# Patient Record
Sex: FEMALE | Race: WHITE | NOT HISPANIC OR LATINO | Employment: OTHER | RURAL
[De-identification: names, ages, dates, MRNs, and addresses within clinical notes are randomized per-mention and may not be internally consistent; named-entity substitution may affect disease eponyms.]

---

## 2017-06-26 ENCOUNTER — OFFICE VISIT (OUTPATIENT)
Dept: FAMILY MEDICINE CLINIC | Facility: CLINIC | Age: 77
End: 2017-06-26

## 2017-06-26 VITALS
HEART RATE: 78 BPM | WEIGHT: 159.9 LBS | TEMPERATURE: 96.9 F | DIASTOLIC BLOOD PRESSURE: 77 MMHG | SYSTOLIC BLOOD PRESSURE: 123 MMHG | BODY MASS INDEX: 25.7 KG/M2 | HEIGHT: 66 IN

## 2017-06-26 DIAGNOSIS — E89.0 POSTOPERATIVE HYPOTHYROIDISM: Primary | ICD-10-CM

## 2017-06-26 DIAGNOSIS — H10.9 CONJUNCTIVITIS OF BOTH EYES, UNSPECIFIED CONJUNCTIVITIS TYPE: ICD-10-CM

## 2017-06-26 DIAGNOSIS — G50.0 TRIGEMINAL NEURALGIA: ICD-10-CM

## 2017-06-26 PROCEDURE — 99213 OFFICE O/P EST LOW 20 MIN: CPT | Performed by: FAMILY MEDICINE

## 2017-06-26 RX ORDER — HYDROCODONE BITARTRATE AND ACETAMINOPHEN 5; 325 MG/1; MG/1
TABLET ORAL
COMMUNITY
Start: 2017-04-06 | End: 2017-06-26

## 2017-06-26 RX ORDER — LEVOTHYROXINE SODIUM 100 MCG
100 TABLET ORAL DAILY
Qty: 30 TABLET | Refills: 6 | Status: SHIPPED | OUTPATIENT
Start: 2017-06-26 | End: 2018-01-22 | Stop reason: SDUPTHER

## 2017-06-26 RX ORDER — LORAZEPAM 2 MG/1
2 TABLET ORAL 2 TIMES DAILY PRN
Qty: 45 TABLET | Refills: 5
Start: 2017-06-26 | End: 2018-01-22 | Stop reason: SDUPTHER

## 2017-06-26 RX ORDER — ERYTHROMYCIN 5 MG/G
OINTMENT OPHTHALMIC EVERY 6 HOURS
Qty: 3.5 G | Refills: 0 | Status: SHIPPED | OUTPATIENT
Start: 2017-06-26 | End: 2018-08-20

## 2017-06-26 RX ORDER — FLUCONAZOLE 200 MG/1
200 TABLET ORAL DAILY
Qty: 2 TABLET | Refills: 0 | Status: SHIPPED | OUTPATIENT
Start: 2017-06-26 | End: 2018-01-22 | Stop reason: SDUPTHER

## 2017-06-26 RX ORDER — PREDNISOLONE ACETATE 10 MG/ML
SUSPENSION/ DROPS OPHTHALMIC
COMMUNITY
Start: 2017-03-28 | End: 2017-06-26

## 2017-06-26 NOTE — PROGRESS NOTES
"Subjective   Leonie Laws is a 76 y.o. female.     History of Present Illness follow-up for medication management.  Having some recurrent eye difficulties as well as recurrent vaginal irritation consistent with yeast.  Staying very active mostly in [employer].  Dietary compliance is labile.  No regular exercise but is active.  It's is current with GI PME.  Current with mammography.  Medications are as reconciled.  The neuralgia is stable.  Has had no chest CDV GI symptoms.  Did have Mohs  surgery on nose.  Has had no other dermatological concerns.    The following portions of the patient's history were reviewed and updated as appropriate: allergies, past family history, past medical history, past social history, past surgical history and problem list.    Review of Systems see the history of present illness    Objective   Physical Exam   Constitutional: She is oriented to person, place, and time. She appears well-developed and well-nourished.   HENT:   Head: Normocephalic.   Mouth/Throat: Oropharynx is clear and moist.   Eyes: EOM are normal. Pupils are equal, round, and reactive to light.   Bilateral scleral conjunctival injection   Neck: Normal range of motion. Neck supple. No thyromegaly present.   Cardiovascular: Normal rate, regular rhythm and normal heart sounds.    Pulmonary/Chest: Effort normal and breath sounds normal.   Neurological: She is alert and oriented to person, place, and time.   Skin: Skin is warm and dry.   Psychiatric: She has a normal mood and affect.     /77 (BP Location: Left arm, Patient Position: Sitting)  Pulse 78  Temp 96.9 °F (36.1 °C) (Oral)   Ht 66\" (167.6 cm)  Wt 159 lb 14.4 oz (72.5 kg)  BMI 25.81 kg/m2  Assessment/Plan   Leonie was seen today for anxiety, vaginitis and med refill.    Diagnoses and all orders for this visit:    Postoperative hypothyroidism    Trigeminal neuralgia    Conjunctivitis of both eyes, unspecified conjunctivitis type    Other orders  -     SYNTHROID " 100 MCG tablet; Take 1 tablet by mouth Daily.  -     conjugated estrogens (PREMARIN) 0.625 MG/GM vaginal cream; Insert  into the vagina Daily. Use twice weekly  -     LORazepam (ATIVAN) 2 MG tablet; Take 1 tablet by mouth 2 (Two) Times a Day As Needed for Anxiety.  -     fluconazole (DIFLUCAN) 200 MG tablet; Take 1 tablet by mouth Daily.  -     erythromycin (ROMYCIN) 5 MG/GM ophthalmic ointment; Apply  to eye Every 6 (Six) Hours.      You state you had recent lab.  Will today renewed medications as noted.  Use the topical antibiotic for eyes.  I encourage you to arrange evaluation at your ophthalmologist.  Today I renewed the lorazepam  45.  5 refills.  Encourage you to make good choices regarding diet and activity.  Keep in a requested follow-up with dermatology of course.  Will recheck in 6 months routinely or as needed.  As part of this patient's treatment plan I am prescribing controlled substances. The patient has been made aware of appropriate use of such medications, including potential risk of somnolence, limited ability to drive and/or work safely, and potential for dependence or overdose. It has also been made clear that these medications are for use by this patient only, without concomitant use of alcohol or other substances unless prescribed.  Patient has completed prescribing agreement detailing terms of continued prescribing of controlled substances, including monitoring CIRILO reports, urine drug screening, and pill counts if necessary. The patient is aware that inappropriate use will results in cessation of prescribing such medications.  CIRILO report has been reviewed.  CIRILO is updated every 3 months.  History and physical exam exhibit continued safe and appropriate use of controlled substances.

## 2017-11-20 PROCEDURE — 87205 SMEAR GRAM STAIN: CPT | Performed by: PHYSICIAN ASSISTANT

## 2017-11-20 PROCEDURE — 87147 CULTURE TYPE IMMUNOLOGIC: CPT | Performed by: PHYSICIAN ASSISTANT

## 2017-11-20 PROCEDURE — 87077 CULTURE AEROBIC IDENTIFY: CPT | Performed by: PHYSICIAN ASSISTANT

## 2017-11-20 PROCEDURE — 87186 SC STD MICRODIL/AGAR DIL: CPT | Performed by: PHYSICIAN ASSISTANT

## 2017-11-20 PROCEDURE — 87070 CULTURE OTHR SPECIMN AEROBIC: CPT | Performed by: PHYSICIAN ASSISTANT

## 2017-11-28 ENCOUNTER — TELEPHONE (OUTPATIENT)
Dept: URGENT CARE | Facility: CLINIC | Age: 77
End: 2017-11-28

## 2018-01-10 ENCOUNTER — TELEPHONE (OUTPATIENT)
Dept: FAMILY MEDICINE CLINIC | Facility: CLINIC | Age: 78
End: 2018-01-10

## 2018-01-10 NOTE — TELEPHONE ENCOUNTER
PATIENT CALLED TO SCHEDULE APPOINTMENT FOR Monday OR Tuesday DUE TO BEING OUT OF TOWN FOR A UTI AND POSSIBLY THE FLU. I ADVISED PATIENT TO SEEK CARE AT A LOCAL URGENT CARE OR ER. I OFFERED APPOINTMENT WITH OUR APRN AND SHE REFUSED. SCHEDULED PATIENT FOR FIRST AVAILABLE 1/22/18 AND AGAIN ADVISED PATIENT TO SEEK CARE SOONER.

## 2018-01-22 ENCOUNTER — OFFICE VISIT (OUTPATIENT)
Dept: FAMILY MEDICINE CLINIC | Facility: CLINIC | Age: 78
End: 2018-01-22

## 2018-01-22 VITALS
SYSTOLIC BLOOD PRESSURE: 124 MMHG | WEIGHT: 147.3 LBS | TEMPERATURE: 97 F | DIASTOLIC BLOOD PRESSURE: 73 MMHG | BODY MASS INDEX: 23.12 KG/M2 | HEART RATE: 86 BPM | HEIGHT: 67 IN

## 2018-01-22 DIAGNOSIS — F41.9 ANXIETY: ICD-10-CM

## 2018-01-22 DIAGNOSIS — R73.01 IMPAIRED FASTING GLUCOSE: ICD-10-CM

## 2018-01-22 DIAGNOSIS — E89.0 POSTOPERATIVE HYPOTHYROIDISM: Primary | ICD-10-CM

## 2018-01-22 DIAGNOSIS — N76.1 SUBACUTE VAGINITIS: ICD-10-CM

## 2018-01-22 PROCEDURE — 99213 OFFICE O/P EST LOW 20 MIN: CPT | Performed by: FAMILY MEDICINE

## 2018-01-22 RX ORDER — FLUCONAZOLE 200 MG/1
200 TABLET ORAL DAILY
Qty: 2 TABLET | Refills: 0 | Status: SHIPPED | OUTPATIENT
Start: 2018-01-22 | End: 2018-08-20

## 2018-01-22 RX ORDER — LEVOTHYROXINE SODIUM 100 MCG
100 TABLET ORAL DAILY
Qty: 30 TABLET | Refills: 6 | Status: SHIPPED | OUTPATIENT
Start: 2018-01-22 | End: 2018-08-20 | Stop reason: SDUPTHER

## 2018-01-22 RX ORDER — LORAZEPAM 2 MG/1
2 TABLET ORAL 2 TIMES DAILY PRN
Qty: 45 TABLET | Refills: 5
Start: 2018-01-22 | End: 2018-08-20 | Stop reason: SDUPTHER

## 2018-01-23 LAB
ALBUMIN SERPL-MCNC: 4.4 G/DL (ref 3.4–4.8)
ALBUMIN/GLOB SERPL: 1.8 G/DL (ref 1.5–2.5)
ALP SERPL-CCNC: 89 U/L (ref 35–104)
ALT SERPL-CCNC: 26 U/L (ref 10–36)
AST SERPL-CCNC: 27 U/L (ref 10–30)
BILIRUB SERPL-MCNC: 0.5 MG/DL (ref 0.2–1.8)
BUN SERPL-MCNC: 22 MG/DL (ref 7–21)
BUN/CREAT SERPL: 26.2 (ref 7–25)
CALCIUM SERPL-MCNC: 9.6 MG/DL (ref 7.7–10)
CHLORIDE SERPL-SCNC: 107 MMOL/L (ref 99–112)
CO2 SERPL-SCNC: 28.4 MMOL/L (ref 24.3–31.9)
CREAT SERPL-MCNC: 0.84 MG/DL (ref 0.43–1.29)
GLOBULIN SER CALC-MCNC: 2.4 GM/DL
GLUCOSE SERPL-MCNC: 109 MG/DL (ref 70–110)
HBA1C MFR BLD: 5.9 % (ref 4.5–5.7)
POTASSIUM SERPL-SCNC: 4.5 MMOL/L (ref 3.5–5.3)
PROT SERPL-MCNC: 6.8 G/DL (ref 6–8)
SODIUM SERPL-SCNC: 140 MMOL/L (ref 135–153)
TSH SERPL DL<=0.005 MIU/L-ACNC: 1.77 MIU/ML (ref 0.55–4.78)

## 2018-08-20 ENCOUNTER — OFFICE VISIT (OUTPATIENT)
Dept: FAMILY MEDICINE CLINIC | Facility: CLINIC | Age: 78
End: 2018-08-20

## 2018-08-20 VITALS
DIASTOLIC BLOOD PRESSURE: 79 MMHG | HEIGHT: 67 IN | BODY MASS INDEX: 23.43 KG/M2 | SYSTOLIC BLOOD PRESSURE: 129 MMHG | TEMPERATURE: 97 F | WEIGHT: 149.3 LBS | HEART RATE: 87 BPM

## 2018-08-20 DIAGNOSIS — R73.01 IMPAIRED FASTING GLUCOSE: ICD-10-CM

## 2018-08-20 DIAGNOSIS — E89.0 POSTOPERATIVE HYPOTHYROIDISM: ICD-10-CM

## 2018-08-20 DIAGNOSIS — R35.0 FREQUENCY OF URINATION: Primary | ICD-10-CM

## 2018-08-20 DIAGNOSIS — F41.9 ANXIETY: ICD-10-CM

## 2018-08-20 LAB
ALBUMIN SERPL-MCNC: 4.6 G/DL (ref 3.4–4.8)
ALBUMIN/GLOB SERPL: 1.9 G/DL (ref 1.5–2.5)
ALP SERPL-CCNC: 92 U/L (ref 35–104)
ALT SERPL-CCNC: 20 U/L (ref 10–36)
AST SERPL-CCNC: 21 U/L (ref 10–30)
BASOPHILS # BLD AUTO: 0.01 10*3/MM3 (ref 0–0.3)
BASOPHILS NFR BLD AUTO: 0.2 % (ref 0–2)
BILIRUB BLD-MCNC: NEGATIVE MG/DL
BILIRUB SERPL-MCNC: 0.6 MG/DL (ref 0.2–1.8)
BUN SERPL-MCNC: 23 MG/DL (ref 7–21)
BUN/CREAT SERPL: 23.7 (ref 7–25)
CALCIUM SERPL-MCNC: 9.3 MG/DL (ref 7.7–10)
CHLORIDE SERPL-SCNC: 108 MMOL/L (ref 99–112)
CHOLEST SERPL-MCNC: 231 MG/DL (ref 0–200)
CLARITY, POC: ABNORMAL
CO2 SERPL-SCNC: 26.2 MMOL/L (ref 24.3–31.9)
COLOR UR: ABNORMAL
CREAT SERPL-MCNC: 0.97 MG/DL (ref 0.43–1.29)
EOSINOPHIL # BLD AUTO: 0.12 10*3/MM3 (ref 0–0.7)
EOSINOPHIL NFR BLD AUTO: 2.1 % (ref 0–7)
ERYTHROCYTE [DISTWIDTH] IN BLOOD BY AUTOMATED COUNT: 15.7 % (ref 11.5–14.5)
GLOBULIN SER CALC-MCNC: 2.4 GM/DL
GLUCOSE SERPL-MCNC: 121 MG/DL (ref 70–110)
GLUCOSE UR STRIP-MCNC: NEGATIVE MG/DL
HBA1C MFR BLD: 5.7 % (ref 4.5–5.7)
HCT VFR BLD AUTO: 42.1 % (ref 37–47)
HDLC SERPL-MCNC: 41 MG/DL (ref 60–100)
HGB BLD-MCNC: 13.9 G/DL (ref 12–16)
IMM GRANULOCYTES # BLD: 0.02 10*3/MM3 (ref 0–0.03)
IMM GRANULOCYTES NFR BLD: 0.3 % (ref 0–0.5)
KETONES UR QL: ABNORMAL
LDLC SERPL CALC-MCNC: 169 MG/DL (ref 0–100)
LEUKOCYTE EST, POC: ABNORMAL
LYMPHOCYTES # BLD AUTO: 2.01 10*3/MM3 (ref 1–3)
LYMPHOCYTES NFR BLD AUTO: 34.7 % (ref 16–46)
MCH RBC QN AUTO: 30 PG (ref 27–33)
MCHC RBC AUTO-ENTMCNC: 33 G/DL (ref 33–37)
MCV RBC AUTO: 90.7 FL (ref 80–94)
MONOCYTES # BLD AUTO: 0.45 10*3/MM3 (ref 0.1–0.9)
MONOCYTES NFR BLD AUTO: 7.8 % (ref 0–12)
NEUTROPHILS # BLD AUTO: 3.19 10*3/MM3 (ref 1.4–6.5)
NEUTROPHILS NFR BLD AUTO: 54.9 % (ref 40–75)
NITRITE UR-MCNC: NEGATIVE MG/ML
PH UR: 5.5 [PH] (ref 5–8)
PLATELET # BLD AUTO: 226 10*3/MM3 (ref 130–400)
POTASSIUM SERPL-SCNC: 4.1 MMOL/L (ref 3.5–5.3)
PROT SERPL-MCNC: 7 G/DL (ref 6–8)
PROT UR STRIP-MCNC: ABNORMAL MG/DL
RBC # BLD AUTO: 4.64 10*6/MM3 (ref 4.2–5.4)
RBC # UR STRIP: ABNORMAL /UL
SODIUM SERPL-SCNC: 140 MMOL/L (ref 135–153)
SP GR UR: 1.03 (ref 1–1.03)
TRIGL SERPL-MCNC: 104 MG/DL (ref 0–150)
TSH SERPL DL<=0.005 MIU/L-ACNC: 2.76 MIU/ML (ref 0.55–4.78)
UROBILINOGEN UR QL: NORMAL
VLDLC SERPL CALC-MCNC: 20.8 MG/DL
WBC # BLD AUTO: 5.8 10*3/MM3 (ref 4.5–12.5)

## 2018-08-20 PROCEDURE — 36415 COLL VENOUS BLD VENIPUNCTURE: CPT | Performed by: FAMILY MEDICINE

## 2018-08-20 PROCEDURE — 99214 OFFICE O/P EST MOD 30 MIN: CPT | Performed by: FAMILY MEDICINE

## 2018-08-20 PROCEDURE — 81002 URINALYSIS NONAUTO W/O SCOPE: CPT | Performed by: FAMILY MEDICINE

## 2018-08-20 RX ORDER — LEVOTHYROXINE SODIUM 100 MCG
100 TABLET ORAL DAILY
Qty: 30 TABLET | Refills: 6 | Status: SHIPPED | OUTPATIENT
Start: 2018-08-20 | End: 2018-12-18 | Stop reason: SDUPTHER

## 2018-08-20 RX ORDER — NITROFURANTOIN 25; 75 MG/1; MG/1
100 CAPSULE ORAL 2 TIMES DAILY
Qty: 20 CAPSULE | Refills: 0 | Status: SHIPPED | OUTPATIENT
Start: 2018-08-20 | End: 2018-08-30

## 2018-08-20 RX ORDER — LORAZEPAM 2 MG/1
2 TABLET ORAL 2 TIMES DAILY PRN
Qty: 45 TABLET | Refills: 5
Start: 2018-08-20 | End: 2019-02-26 | Stop reason: SDUPTHER

## 2018-08-20 NOTE — PROGRESS NOTES
Subjective   Leonie Laws is a 78 y.o. female.     History of Present Illness several days of recurrent lower urinary tract symptoms.  Having episodic eye symptoms.  Still struggling with the trigeminal neuralgia and plans to follow-up over at [facility] soon.  Medications are as reconciled.  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  Denies chest CDV GI symptoms.  Sleep patterns are stable.  Energy is fair.  No restricted diet.  No regular aerobic exercise.    The following portions of the patient's history were reviewed and updated as appropriate: current medications, past family history, past medical history, past social history, past surgical history and problem list.    Review of Systems see the history of present illness    Objective   Physical Exam   Constitutional: She is oriented to person, place, and time. She appears well-developed and well-nourished.   HENT:   Head: Normocephalic.   Eyes: Pupils are equal, round, and reactive to light. EOM are normal.   Neck: Normal range of motion. Neck supple. No tracheal deviation present. No thyromegaly present.   Cardiovascular: Normal rate, regular rhythm and normal heart sounds.    No murmur heard.  Pulmonary/Chest: Effort normal and breath sounds normal.   Abdominal: Soft. There is no tenderness.   Musculoskeletal: She exhibits no edema.   Neurological: She is alert and oriented to person, place, and time.   Skin: Skin is warm and dry.   Psychiatric: She has a normal mood and affect.   Vitals reviewed.      Assessment/Plan   Leonie was seen today for urinary tract infection and vaginitis.    Diagnoses and all orders for this visit:    Frequency of urination  -     POCT urinalysis dipstick, manual  -     nitrofurantoin, macrocrystal-monohydrate, (MACROBID) 100 MG capsule; Take 1 capsule by mouth 2 (Two) Times a Day for 10 days.    Anxiety  -     LORazepam (ATIVAN) 2 MG tablet;  Take 1 tablet by mouth 2 (Two) Times a Day As Needed for Anxiety.    Postoperative hypothyroidism  -     SYNTHROID 100 MCG tablet; Take 1 tablet by mouth Daily.  -     Comprehensive Metabolic Panel  -     TSH  -     Lipid Panel    Impaired fasting glucose  -     CBC & Differential  -     Comprehensive Metabolic Panel  -     Lipid Panel  -     Hemoglobin A1c      Meds as directed.  In office urine dip noted.  Encourage some carbohydrate restrictions.  Stay safely active.  Recheck in a few months routinely.  Encourage you to consider vaccines.      Patient's Body mass index is 23.38 kg/m². BMI is within normal parameters. No follow-up required.

## 2018-08-21 NOTE — PROGRESS NOTES
Lab testing is very stable.  Cholesterol is quite poor but has been before.  Let us know if willing to use a medication to help with cholesterol.  Thyroid good.

## 2018-12-18 DIAGNOSIS — E89.0 POSTOPERATIVE HYPOTHYROIDISM: ICD-10-CM

## 2018-12-18 RX ORDER — LEVOTHYROXINE SODIUM 100 MCG
100 TABLET ORAL DAILY
Qty: 30 TABLET | Refills: 6 | Status: SHIPPED | OUTPATIENT
Start: 2018-12-18 | End: 2019-02-26 | Stop reason: SDUPTHER

## 2018-12-18 NOTE — TELEPHONE ENCOUNTER
Patient called requesting a refill on synthroid. Send to [facility] IN [place]. P#[phone].

## 2019-02-26 ENCOUNTER — OFFICE VISIT (OUTPATIENT)
Dept: FAMILY MEDICINE CLINIC | Facility: CLINIC | Age: 79
End: 2019-02-26

## 2019-02-26 VITALS
TEMPERATURE: 97.4 F | DIASTOLIC BLOOD PRESSURE: 69 MMHG | HEART RATE: 87 BPM | SYSTOLIC BLOOD PRESSURE: 125 MMHG | BODY MASS INDEX: 23.87 KG/M2 | WEIGHT: 152.1 LBS | HEIGHT: 67 IN

## 2019-02-26 DIAGNOSIS — F41.9 ANXIETY: Primary | ICD-10-CM

## 2019-02-26 DIAGNOSIS — G50.0 TRIGEMINAL NEURALGIA: ICD-10-CM

## 2019-02-26 DIAGNOSIS — R73.01 IMPAIRED FASTING GLUCOSE: ICD-10-CM

## 2019-02-26 DIAGNOSIS — E89.0 POSTOPERATIVE HYPOTHYROIDISM: ICD-10-CM

## 2019-02-26 LAB
ALBUMIN SERPL-MCNC: 4.5 G/DL (ref 3.4–4.8)
ALBUMIN/GLOB SERPL: 1.8 G/DL (ref 1.5–2.5)
ALP SERPL-CCNC: 92 U/L (ref 35–104)
ALT SERPL-CCNC: 20 U/L (ref 10–36)
AST SERPL-CCNC: 24 U/L (ref 10–30)
BASOPHILS # BLD AUTO: 0.03 10*3/MM3 (ref 0–0.3)
BASOPHILS NFR BLD AUTO: 0.5 % (ref 0–2)
BILIRUB SERPL-MCNC: 0.7 MG/DL (ref 0.2–1.8)
BUN SERPL-MCNC: 18 MG/DL (ref 7–21)
BUN/CREAT SERPL: 20 (ref 7–25)
CALCIUM SERPL-MCNC: 9.8 MG/DL (ref 7.7–10)
CHLORIDE SERPL-SCNC: 106 MMOL/L (ref 99–112)
CHOLEST SERPL-MCNC: 206 MG/DL (ref 0–200)
CO2 SERPL-SCNC: 25.6 MMOL/L (ref 24.3–31.9)
CREAT SERPL-MCNC: 0.9 MG/DL (ref 0.43–1.29)
EOSINOPHIL # BLD AUTO: 0.12 10*3/MM3 (ref 0–0.7)
EOSINOPHIL NFR BLD AUTO: 2.1 % (ref 0–7)
ERYTHROCYTE [DISTWIDTH] IN BLOOD BY AUTOMATED COUNT: 15.5 % (ref 11.5–14.5)
GLOBULIN SER CALC-MCNC: 2.5 GM/DL
GLUCOSE SERPL-MCNC: 124 MG/DL (ref 70–110)
HBA1C MFR BLD: 6.3 % (ref 4.5–5.7)
HCT VFR BLD AUTO: 41.4 % (ref 37–47)
HDLC SERPL-MCNC: 40 MG/DL (ref 60–100)
HGB BLD-MCNC: 13.6 G/DL (ref 12–16)
IMM GRANULOCYTES # BLD AUTO: 0.01 10*3/MM3 (ref 0–0.03)
IMM GRANULOCYTES NFR BLD AUTO: 0.2 % (ref 0–0.5)
LDLC SERPL CALC-MCNC: 139 MG/DL (ref 0–100)
LYMPHOCYTES # BLD AUTO: 2.03 10*3/MM3 (ref 1–3)
LYMPHOCYTES NFR BLD AUTO: 35.3 % (ref 16–46)
MCH RBC QN AUTO: 29.6 PG (ref 27–33)
MCHC RBC AUTO-ENTMCNC: 32.9 G/DL (ref 33–37)
MCV RBC AUTO: 90 FL (ref 80–94)
MONOCYTES # BLD AUTO: 0.4 10*3/MM3 (ref 0.1–0.9)
MONOCYTES NFR BLD AUTO: 7 % (ref 0–12)
NEUTROPHILS # BLD AUTO: 3.16 10*3/MM3 (ref 1.4–6.5)
NEUTROPHILS NFR BLD AUTO: 54.9 % (ref 40–75)
PLATELET # BLD AUTO: 226 10*3/MM3 (ref 130–400)
POTASSIUM SERPL-SCNC: 4.5 MMOL/L (ref 3.5–5.3)
PROT SERPL-MCNC: 7 G/DL (ref 6–8)
RBC # BLD AUTO: 4.6 10*6/MM3 (ref 4.2–5.4)
SODIUM SERPL-SCNC: 138 MMOL/L (ref 135–153)
TRIGL SERPL-MCNC: 133 MG/DL (ref 0–150)
TSH SERPL DL<=0.005 MIU/L-ACNC: 2.66 MIU/ML (ref 0.55–4.78)
VLDLC SERPL CALC-MCNC: 26.6 MG/DL
WBC # BLD AUTO: 5.75 10*3/MM3 (ref 4.5–12.5)

## 2019-02-26 PROCEDURE — 99213 OFFICE O/P EST LOW 20 MIN: CPT | Performed by: FAMILY MEDICINE

## 2019-02-26 PROCEDURE — 36415 COLL VENOUS BLD VENIPUNCTURE: CPT | Performed by: FAMILY MEDICINE

## 2019-02-26 RX ORDER — LORAZEPAM 2 MG/1
2 TABLET ORAL 2 TIMES DAILY PRN
Qty: 45 TABLET | Refills: 5
Start: 2019-02-26 | End: 2019-07-29 | Stop reason: SDUPTHER

## 2019-02-26 RX ORDER — ESTRADIOL 0.05 MG/D
1 FILM, EXTENDED RELEASE TRANSDERMAL WEEKLY
Qty: 1 PATCH | Refills: 6 | Status: SHIPPED | OUTPATIENT
Start: 2019-02-26 | End: 2019-09-16 | Stop reason: SDUPTHER

## 2019-02-26 RX ORDER — LEVOTHYROXINE SODIUM 100 MCG
100 TABLET ORAL DAILY
Qty: 30 TABLET | Refills: 6 | Status: SHIPPED | OUTPATIENT
Start: 2019-02-26

## 2019-02-26 RX ORDER — ESTRADIOL 0.05 MG/D
FILM, EXTENDED RELEASE TRANSDERMAL
COMMUNITY
Start: 2019-02-24 | End: 2019-02-26 | Stop reason: SDUPTHER

## 2019-02-26 NOTE — PROGRESS NOTES
Subjective   Leonie Laws is a 78 y.o. female.     History of Present Illness follow-up regarding hypothyroid state anxiety hyperglycemia.  Medication management.  Overall no new problems.  Will be following at [facility] in regard to the trigeminal neuralgia.  Anticipates having another gamma knife procedure.  Did have the gamma knife and passed did have the rhizotomy injection therapy also.  Did also have a cranial balloon procedure.  Not reported to be incapacitating at this time.  Medications are as reconciled.  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  Has not been recently ill.  Denies chest CVA GI  changes.  No interest in vaccine or PMA beyond what is current.    The following portions of the patient's history were reviewed and updated as appropriate: allergies, past family history, past medical history, past social history, past surgical history and problem list.    Review of Systems see HPI    Objective   Physical Exam   Constitutional: She is oriented to person, place, and time. She appears well-developed and well-nourished.   HENT:   Head: Normocephalic.   Eyes: Conjunctivae and EOM are normal. Pupils are equal, round, and reactive to light.   Neck: Normal range of motion. Neck supple. No tracheal deviation present. No thyromegaly present.   Cardiovascular: Normal rate, regular rhythm and normal heart sounds.   No murmur heard.  Pulmonary/Chest: Effort normal and breath sounds normal.   Neurological: She is alert and oriented to person, place, and time.   Skin: Skin is warm and dry.   Psychiatric: She has a normal mood and affect.   Vitals reviewed.      Assessment/Plan   Leonie was seen today for hypothyroidism and anxiety.    Diagnoses and all orders for this visit:    Anxiety  -     LORazepam (ATIVAN) 2 MG tablet; Take 1 tablet by mouth 2 (Two) Times a Day As Needed for Anxiety.    Postoperative hypothyroidism  -     SYNTHROID 100 MCG tablet;  Take 1 tablet by mouth Daily.  -     CBC & Differential  -     Comprehensive Metabolic Panel  -     Lipid Panel  -     TSH    Trigeminal neuralgia    Impaired fasting glucose  -     CBC & Differential  -     Comprehensive Metabolic Panel  -     Hemoglobin A1c    Other orders  -     estradiol (MINIVELLE, VIVELLE-DOT) 0.05 MG/24HR patch; Place 1 patch on the skin as directed by provider 1 (One) Time Per Week.    Encouraged compliance with diet and activity.  Discussed vaccines but you are not interested.  Renewed medication as noted.  Will check labs to monitor metabolic status.  Recheck here in about 6 months or as needed.  You last had mammogram April 2018.  Hopefully upcoming procedure will give the relief you desire regarding the trigeminal neuralgia.  As part of this patient's treatment plan I am prescribing controlled substances. The patient has been made aware of appropriate use of such medications, including potential risk of somnolence, limited ability to drive and/or work safely, and potential for dependence or overdose. It has also been made clear that these medications are for use by this patient only, without concomitant use of alcohol or other substances unless prescribed.  Patient has completed prescribing agreement detailing terms of continued prescribing of controlled substances, including monitoring CIRILO reports, urine drug screening, and pill counts if necessary. The patient is aware that inappropriate use will results in cessation of prescribing such medications.  CIRILO report has been reviewed.  CIRILO is updated every 3 months.  History and physical exam exhibit continued safe and appropriate use of controlled substances.       Patient's Body mass index is 23.82 kg/m². BMI is within normal parameters. No follow-up required..

## 2019-02-27 NOTE — PROGRESS NOTES
Chol profile some better  thyroid level normal chemistries good  average sugar little higher   try to do better with diet-less ice cream  get some regular exercise

## 2019-05-06 ENCOUNTER — OFFICE VISIT (OUTPATIENT)
Dept: FAMILY MEDICINE CLINIC | Facility: CLINIC | Age: 79
End: 2019-05-06

## 2019-05-06 VITALS
HEIGHT: 67 IN | SYSTOLIC BLOOD PRESSURE: 142 MMHG | BODY MASS INDEX: 24.33 KG/M2 | TEMPERATURE: 98.2 F | DIASTOLIC BLOOD PRESSURE: 82 MMHG | HEART RATE: 91 BPM | OXYGEN SATURATION: 98 % | WEIGHT: 155 LBS

## 2019-05-06 DIAGNOSIS — G50.0 TRIGEMINAL NEURALGIA: Primary | ICD-10-CM

## 2019-05-06 PROCEDURE — 99213 OFFICE O/P EST LOW 20 MIN: CPT | Performed by: FAMILY MEDICINE

## 2019-05-06 RX ORDER — ESTRADIOL 0.05 MG/D
1 FILM, EXTENDED RELEASE TRANSDERMAL WEEKLY
Qty: 1 PATCH | Refills: 6 | Status: CANCELLED | OUTPATIENT
Start: 2019-05-06

## 2019-05-06 RX ORDER — GABAPENTIN 400 MG/1
400 CAPSULE ORAL
Qty: 60 CAPSULE | Refills: 1
Start: 2019-05-06 | End: 2019-07-29 | Stop reason: SDUPTHER

## 2019-05-06 RX ORDER — GABAPENTIN 400 MG/1
CAPSULE ORAL
COMMUNITY
End: 2019-05-06 | Stop reason: SDUPTHER

## 2019-05-06 NOTE — PROGRESS NOTES
Subjective   Leonie Laws is a 78 y.o. female.     History of Present Illness some allergy symptoms but essentially desires renewal of gabapentin that is used episodically although most recently on a regular basis for trigeminal neuralgia.  Plans to return [facility] to see specialist for a repeat rhizotomy procedure.  Has had prior.  Has had gamma knife.  Has had balloon procedure.  Utilizing other medications as reconciled.  Denies recent acute illness.  Denies chest CDV GI  changes.    The following portions of the patient's history were reviewed and updated as appropriate: allergies, current medications, past medical history, past social history, past surgical history and problem list.    Review of Systems  See history of Present Illness     Objective     Physical Exam   Constitutional: She is oriented to person, place, and time. She appears well-developed and well-nourished.   Neurological: She is alert and oriented to person, place, and time.   Psychiatric: She has a normal mood and affect.   Vitals reviewed.      PHQ-9 Total Score: 0    Patient's Body mass index is 24.27 kg/m². BMI is within normal parameters. No follow-up required..   (Normal BMI:  18.5-24.9, OW 25-29.9, Obesity 30 or greater)      Assessment/Plan     Leonie was seen today for allergies.    Diagnoses and all orders for this visit:    Trigeminal neuralgia  -     gabapentin (NEURONTIN) 400 MG capsule; Take 1 capsule by mouth 2 (Two) Times a Day.    Other orders  -     Cancel: estradiol (MINIVELLE, VIVELLE-DOT) 0.05 MG/24HR patch; Place 1 patch on the skin as directed by provider 1 (One) Time Per Week.    We will renew the gabapentin to have available twice daily.  Stay safely active.  Maintain heart healthy low glycemic diet.  Follow-up here as scheduled needed in future.  Keep follow-ups with neurology consultants of course.  Abhishek reviewed               This document has been electronically signed by Milton Morales MD    May 6, 2019 1:22 PM

## 2019-07-29 ENCOUNTER — OFFICE VISIT (OUTPATIENT)
Dept: FAMILY MEDICINE CLINIC | Facility: CLINIC | Age: 79
End: 2019-07-29

## 2019-07-29 VITALS
WEIGHT: 154.9 LBS | OXYGEN SATURATION: 98 % | HEIGHT: 67 IN | TEMPERATURE: 98.1 F | HEART RATE: 80 BPM | BODY MASS INDEX: 24.31 KG/M2 | SYSTOLIC BLOOD PRESSURE: 124 MMHG | DIASTOLIC BLOOD PRESSURE: 71 MMHG

## 2019-07-29 DIAGNOSIS — J30.89 ALLERGIC RHINITIS DUE TO OTHER ALLERGIC TRIGGER, UNSPECIFIED SEASONALITY: Primary | ICD-10-CM

## 2019-07-29 DIAGNOSIS — G50.0 TRIGEMINAL NEURALGIA: ICD-10-CM

## 2019-07-29 DIAGNOSIS — F41.9 ANXIETY: ICD-10-CM

## 2019-07-29 PROCEDURE — 99213 OFFICE O/P EST LOW 20 MIN: CPT | Performed by: FAMILY MEDICINE

## 2019-07-29 RX ORDER — GABAPENTIN 400 MG/1
400 CAPSULE ORAL
Qty: 60 CAPSULE | Refills: 3
Start: 2019-07-29 | End: 2019-12-30 | Stop reason: SDUPTHER

## 2019-07-29 RX ORDER — LORAZEPAM 2 MG/1
2 TABLET ORAL 2 TIMES DAILY PRN
Qty: 45 TABLET | Refills: 5
Start: 2019-07-29 | End: 2019-12-30 | Stop reason: SDUPTHER

## 2019-07-29 RX ORDER — METHYLPREDNISOLONE 4 MG/1
TABLET ORAL
Qty: 1 EACH | Refills: 0 | Status: SHIPPED | OUTPATIENT
Start: 2019-07-29 | End: 2019-12-30

## 2019-07-29 NOTE — PROGRESS NOTES
Subjective   Leonie Laws is a 79 y.o. female.     History of Present Illness recurrent rhinitis symptoms.  I will allergy symptoms.  Some sneezing.  OTC no benefit.  Medication management.  Will be planning to go to [place] for another procedure regarding the trigeminal neuralgia.    The following portions of the patient's history were reviewed and updated as appropriate: allergies, current medications, past family history, past social history, past surgical history and problem list.    Review of Systems  See history of Present Illness     Objective     Physical Exam   Constitutional: She is oriented to person, place, and time. She appears well-developed and well-nourished.   HENT:   Head: Normocephalic.   Mouth/Throat: Oropharynx is clear and moist.   Mild nasal congestion   Eyes: EOM are normal. Pupils are equal, round, and reactive to light.   Conjunctiva slightly injected   Cardiovascular: Normal rate, regular rhythm and normal heart sounds.   Pulmonary/Chest: Effort normal and breath sounds normal. She has no wheezes.   Neurological: She is alert and oriented to person, place, and time.   Psychiatric: She has a normal mood and affect.   Vitals reviewed.      PHQ-9 Total Score:      Patient's Body mass index is 24.25 kg/m². BMI is within normal parameters. No follow-up required..   (Normal BMI:  18.5-24.9, OW 25-29.9, Obesity 30 or greater)      Assessment/Plan     Leonie was seen today for allergies.    Diagnoses and all orders for this visit:    Allergic rhinitis due to other allergic trigger, unspecified seasonality  -     methylPREDNISolone (MEDROL, NICK,) 4 MG tablet; Take as directed on package instructions.    Anxiety  -     LORazepam (ATIVAN) 2 MG tablet; Take 1 tablet by mouth 2 (Two) Times a Day As Needed for Anxiety.    Trigeminal neuralgia  -     gabapentin (NEURONTIN) 400 MG capsule; Take 1 capsule by mouth 2 (Two) Times a Day.      Meds as directed.  Renewed some chronic medications.  Keep  follow-ups as needed.  Stay safely active.  Maintain heart healthy diet.  As part of this patient's treatment plan I am prescribing controlled substances. The patient has been made aware of appropriate use of such medications, including potential risk of somnolence, limited ability to drive and/or work safely, and potential for dependence or overdose. It has also been made clear that these medications are for use by this patient only, without concomitant use of alcohol or other substances unless prescribed.  Patient has completed prescribing agreement detailing terms of continued prescribing of controlled substances, including monitoring CIRILO reports, urine drug screening, and pill counts if necessary. The patient is aware that inappropriate use will results in cessation of prescribing such medications.  CIRILO report has been reviewed.  CIRILO is updated every 3 months.  History and physical exam exhibit continued safe and appropriate use of controlled substances.                      This document has been electronically signed by Milton Morales MD   July 29, 2019 4:46 PM

## 2019-09-16 RX ORDER — ESTRADIOL 0.05 MG/D
1 FILM, EXTENDED RELEASE TRANSDERMAL WEEKLY
Qty: 1 PATCH | Refills: 6 | Status: SHIPPED | OUTPATIENT
Start: 2019-09-16 | End: 2019-09-17 | Stop reason: SDUPTHER

## 2019-09-16 NOTE — TELEPHONE ENCOUNTER
LAST REFILL  ESTRADIOL PATCHES 2-26-19 # 1 REFILL X 6    LAST APPOINTMENT: 7-29-19  NEXT APPOINTMENT: NONE.

## 2019-09-17 NOTE — TELEPHONE ENCOUNTER
Refill on estradiol patches, went to wrong pharmacy.  [facility] to [facility].    Last filled estradiol patches 2-26-19 # 1 refill x 6  Last appointment: 7-29-19  Next appointment: none

## 2019-09-18 DIAGNOSIS — M81.0 AGE-RELATED OSTEOPOROSIS WITHOUT CURRENT PATHOLOGICAL FRACTURE: Primary | ICD-10-CM

## 2019-09-18 RX ORDER — ESTRADIOL 0.05 MG/D
1 FILM, EXTENDED RELEASE TRANSDERMAL WEEKLY
Qty: 1 PATCH | Refills: 6 | Status: SHIPPED | OUTPATIENT
Start: 2019-09-18 | End: 2019-12-30 | Stop reason: SDUPTHER

## 2019-09-18 RX ORDER — ESTRADIOL 0.05 MG/D
1 FILM, EXTENDED RELEASE TRANSDERMAL WEEKLY
Qty: 1 PATCH | Refills: 6 | Status: SHIPPED | OUTPATIENT
Start: 2019-09-18 | End: 2019-09-18 | Stop reason: SDUPTHER

## 2019-12-30 ENCOUNTER — OFFICE VISIT (OUTPATIENT)
Dept: FAMILY MEDICINE CLINIC | Facility: CLINIC | Age: 79
End: 2019-12-30

## 2019-12-30 VITALS
BODY MASS INDEX: 23.73 KG/M2 | TEMPERATURE: 97.7 F | HEART RATE: 91 BPM | SYSTOLIC BLOOD PRESSURE: 142 MMHG | DIASTOLIC BLOOD PRESSURE: 86 MMHG | HEIGHT: 67 IN | WEIGHT: 151.2 LBS | OXYGEN SATURATION: 98 %

## 2019-12-30 DIAGNOSIS — F41.9 ANXIETY: ICD-10-CM

## 2019-12-30 DIAGNOSIS — M81.0 AGE-RELATED OSTEOPOROSIS WITHOUT CURRENT PATHOLOGICAL FRACTURE: ICD-10-CM

## 2019-12-30 DIAGNOSIS — G50.0 TRIGEMINAL NEURALGIA: ICD-10-CM

## 2019-12-30 PROCEDURE — 99213 OFFICE O/P EST LOW 20 MIN: CPT | Performed by: FAMILY MEDICINE

## 2019-12-30 RX ORDER — ESTRADIOL 0.05 MG/D
1 FILM, EXTENDED RELEASE TRANSDERMAL WEEKLY
Qty: 1 PATCH | Refills: 6 | Status: SHIPPED | OUTPATIENT
Start: 2019-12-30 | End: 2020-05-04 | Stop reason: SDUPTHER

## 2019-12-30 RX ORDER — LORAZEPAM 2 MG/1
2 TABLET ORAL 2 TIMES DAILY PRN
Qty: 45 TABLET | Refills: 5
Start: 2019-12-30 | End: 2020-06-25 | Stop reason: SDUPTHER

## 2019-12-30 RX ORDER — GABAPENTIN 400 MG/1
400 CAPSULE ORAL
Qty: 60 CAPSULE | Refills: 3
Start: 2019-12-30 | End: 2020-06-25 | Stop reason: SDUPTHER

## 2019-12-30 NOTE — PROGRESS NOTES
Subjective   Leonie Laws is a 79 y.o. female.     History of Present Illness follow-up regarding medication management regards to the trigeminal neuralgia as well as the chronic anxiety.  Desires renewal on the estrogen patch also.  Will be visiting again at [facility] in near future.  Current on desired PME.  Declines vaccines.  Denies respiratory CDV  GI.  Has episodic excess tearing regarding left eye.  Several attempts at topical relief and ophthalmology eval's without benefit.    The following portions of the patient's history were reviewed and updated as appropriate: allergies, past family history, past medical history, past social history, past surgical history and problem list.    Review of Systems  See history of Present Illness     Objective     Physical Exam   Constitutional: She is oriented to person, place, and time. She appears well-developed and well-nourished.   HENT:   Head: Normocephalic.   Eyes: Pupils are equal, round, and reactive to light. Conjunctivae and EOM are normal.   Neck: Normal range of motion. Neck supple. No tracheal deviation present. No thyromegaly present.   Cardiovascular: Normal rate, regular rhythm and normal heart sounds.   No murmur heard.  Pulmonary/Chest: Effort normal and breath sounds normal.   Neurological: She is alert and oriented to person, place, and time.   Skin: Skin is warm and dry.   Psychiatric: She has a normal mood and affect.   Vitals reviewed.      PHQ-9 Total Score:      Patient's Body mass index is 23.68 kg/m². BMI is within normal parameters. No follow-up required..   (Normal BMI:  18.5-24.9, OW 25-29.9, Obesity 30 or greater)      Assessment/Plan     Leonie was seen today for eye problem and med refill.    Diagnoses and all orders for this visit:    Age-related osteoporosis without current pathological fracture  -     estradiol (MINIVELLE, VIVELLE-DOT) 0.05 MG/24HR patch; Place 1 patch on the skin as directed by provider 1 (One) Time Per  Week.    Trigeminal neuralgia  -     gabapentin (NEURONTIN) 400 MG capsule; Take 1 capsule by mouth 2 (Two) Times a Day.    Anxiety  -     LORazepam (ATIVAN) 2 MG tablet; Take 1 tablet by mouth 2 (Two) Times a Day As Needed for Anxiety.    Authorize the Neurontin and the lorazepam to be filled after 1/17/2020.  Counseled.  Abhishek reviewed.  Counseled about prescribers.  Will be available to see in future as needed.                     This document has been electronically signed by Milton Morales MD   December 30, 2019 1:12 PM

## 2020-02-03 DIAGNOSIS — F41.9 ANXIETY: ICD-10-CM

## 2020-02-03 NOTE — TELEPHONE ENCOUNTER
[facility] called spoke with Lowell the prescription  and needs a new one sent.     Last filled: lorazepam 19 refill 5     Last seen: 19

## 2020-02-06 RX ORDER — LORAZEPAM 2 MG/1
2 TABLET ORAL 2 TIMES DAILY PRN
Qty: 45 TABLET | Refills: 5 | OUTPATIENT
Start: 2020-02-06

## 2020-03-03 ENCOUNTER — TELEPHONE (OUTPATIENT)
Dept: FAMILY MEDICINE CLINIC | Facility: CLINIC | Age: 80
End: 2020-03-03

## 2020-03-03 NOTE — TELEPHONE ENCOUNTER
PT REQUESTED REFILLS ON GABAPENTIN AND LORAZEPAM.  I CALLED HER AND TOLD HER DR PALMER HAD GIVEN HER A WRITTEN PRESCRIPTION ON 12/30/19 WITH REFILLS FOR BOTH OF THOSE MEDICATIONS.  SHE STATES SHE WAS NOT SURE WHERE SHE HAD PUT THEM, PROBABLY AT HOME IN [place].  WILL LOOK FOR THEM.

## 2020-05-04 ENCOUNTER — OFFICE VISIT (OUTPATIENT)
Dept: FAMILY MEDICINE CLINIC | Facility: CLINIC | Age: 80
End: 2020-05-04

## 2020-05-04 VITALS
HEART RATE: 87 BPM | WEIGHT: 154.6 LBS | HEIGHT: 67 IN | DIASTOLIC BLOOD PRESSURE: 74 MMHG | TEMPERATURE: 98.1 F | SYSTOLIC BLOOD PRESSURE: 134 MMHG | OXYGEN SATURATION: 98 % | BODY MASS INDEX: 24.27 KG/M2

## 2020-05-04 DIAGNOSIS — M81.0 AGE-RELATED OSTEOPOROSIS WITHOUT CURRENT PATHOLOGICAL FRACTURE: ICD-10-CM

## 2020-05-04 DIAGNOSIS — M54.50 LOW BACK PAIN WITHOUT SCIATICA, UNSPECIFIED BACK PAIN LATERALITY, UNSPECIFIED CHRONICITY: Primary | ICD-10-CM

## 2020-05-04 DIAGNOSIS — G50.0 TRIGEMINAL NEURALGIA: ICD-10-CM

## 2020-05-04 LAB
BILIRUB BLD-MCNC: NEGATIVE MG/DL
CLARITY, POC: ABNORMAL
COLOR UR: YELLOW
GLUCOSE UR STRIP-MCNC: NEGATIVE MG/DL
KETONES UR QL: NEGATIVE
LEUKOCYTE EST, POC: NEGATIVE
NITRITE UR-MCNC: NEGATIVE MG/ML
PH UR: 6 [PH] (ref 5–8)
PROT UR STRIP-MCNC: ABNORMAL MG/DL
RBC # UR STRIP: NEGATIVE /UL
SP GR UR: 1.02 (ref 1–1.03)
UROBILINOGEN UR QL: NORMAL

## 2020-05-04 PROCEDURE — 81002 URINALYSIS NONAUTO W/O SCOPE: CPT | Performed by: FAMILY MEDICINE

## 2020-05-04 PROCEDURE — 99213 OFFICE O/P EST LOW 20 MIN: CPT | Performed by: FAMILY MEDICINE

## 2020-05-04 RX ORDER — ESTRADIOL 0.05 MG/D
1 FILM, EXTENDED RELEASE TRANSDERMAL WEEKLY
Qty: 1 PATCH | Refills: 6 | Status: SHIPPED | OUTPATIENT
Start: 2020-05-04 | End: 2020-06-25 | Stop reason: SDUPTHER

## 2020-05-04 NOTE — PROGRESS NOTES
Subjective   Leonie Laws is a 79 y.o. female.     History of Present Illness comes in today regarding medication management but also concerned about possible UTI in light of some low back discomfort.  Denies urinary urgency frequency.  Denies burning.  Utilizing medications as reconciled.  States is using the lorazepam at least twice a day.  Gabapentin 400 mg twice a day.  Request a refill on estradiol patch to use episodically.  Remote hysterectomy.  Spends a lot of time in [facility] at home there as well as time here.    The following portions of the patient's history were reviewed and updated as appropriate: allergies, current medications, past family history, past social history, past surgical history and problem list.    Review of Systems  See history of Present Illness     Objective     Physical Exam   Constitutional: She is oriented to person, place, and time. She appears well-developed and well-nourished.   HENT:   Head: Normocephalic.   Eyes: Pupils are equal, round, and reactive to light. Conjunctivae and EOM are normal.   Neck: Normal range of motion. Neck supple. No tracheal deviation present. No thyromegaly present.   Cardiovascular: Normal rate, regular rhythm and normal heart sounds.   No murmur heard.  Pulmonary/Chest: Effort normal and breath sounds normal.   Abdominal:   No flank or CVA tenderness.   Neurological: She is alert and oriented to person, place, and time.   Skin: Skin is warm and dry.   Psychiatric: She has a normal mood and affect.   Vitals reviewed.      PHQ-9 Total Score:      Patient's Body mass index is 24.21 kg/m². BMI is within normal parameters. No follow-up required..   (Normal BMI:  18.5-24.9, OW 25-29.9, Obesity 30 or greater)      Assessment/Plan     Leonie was seen today for med refill and urinary tract infection.    Diagnoses and all orders for this visit:    Low back pain without sciatica, unspecified back pain laterality, unspecified chronicity  -     POCT  urinalysis dipstick, manual    Age-related osteoporosis without current pathological fracture  -     estradiol (MINIVELLE, VIVELLE-DOT) 0.05 MG/24HR patch; Place 1 patch on the skin as directed by provider 1 (One) Time Per Week.    Trigeminal neuralgia      In office UA dip was negative.  I do not believe antibiotics are indicated.  Reviewed Abhishek.  Has had prescriptions obtained from another provider.  Has had prescriptions filled at more than 1 pharmacy.  Bottles that you present today show 2 refills on existing lorazepam.  No refill on existing gabapentin.  These prescriptions were obtained within the last 10 days.  You are honest with this information.  I explained to you that one provider only and 1 pharmacy only is what we had agreed upon.  No medications renewed today.  Will authorize when needed and will  be sent electronically  To only 1 pharmacy.  Follow-up as needed and pending.  You report the trigeminal neuralgia is quiet at this time.                 This document has been electronically signed by Milton Morales MD   May 4, 2020 16:29

## 2020-06-25 ENCOUNTER — OFFICE VISIT (OUTPATIENT)
Dept: FAMILY MEDICINE CLINIC | Facility: CLINIC | Age: 80
End: 2020-06-25

## 2020-06-25 VITALS
TEMPERATURE: 97.5 F | HEART RATE: 90 BPM | OXYGEN SATURATION: 98 % | HEIGHT: 67 IN | DIASTOLIC BLOOD PRESSURE: 82 MMHG | SYSTOLIC BLOOD PRESSURE: 134 MMHG | BODY MASS INDEX: 23.01 KG/M2 | WEIGHT: 146.6 LBS

## 2020-06-25 DIAGNOSIS — M81.0 AGE-RELATED OSTEOPOROSIS WITHOUT CURRENT PATHOLOGICAL FRACTURE: ICD-10-CM

## 2020-06-25 DIAGNOSIS — G50.0 TRIGEMINAL NEURALGIA: ICD-10-CM

## 2020-06-25 DIAGNOSIS — F41.9 ANXIETY: ICD-10-CM

## 2020-06-25 PROCEDURE — 99213 OFFICE O/P EST LOW 20 MIN: CPT | Performed by: FAMILY MEDICINE

## 2020-06-25 RX ORDER — NITROFURANTOIN 25; 75 MG/1; MG/1
100 CAPSULE ORAL 2 TIMES DAILY
Qty: 14 CAPSULE | Refills: 0 | Status: SHIPPED | OUTPATIENT
Start: 2020-06-25 | End: 2020-07-02

## 2020-06-25 RX ORDER — OLOPATADINE HYDROCHLORIDE 1 MG/ML
1 SOLUTION/ DROPS OPHTHALMIC 2 TIMES DAILY
Qty: 5 ML | Refills: 3 | Status: SHIPPED | OUTPATIENT
Start: 2020-06-25

## 2020-06-25 RX ORDER — LORAZEPAM 2 MG/1
2 TABLET ORAL NIGHTLY PRN
Qty: 30 TABLET | Refills: 3 | Status: SHIPPED | OUTPATIENT
Start: 2020-06-25

## 2020-06-25 RX ORDER — ESTRADIOL 0.05 MG/D
1 FILM, EXTENDED RELEASE TRANSDERMAL WEEKLY
Qty: 1 PATCH | Refills: 6 | Status: SHIPPED | OUTPATIENT
Start: 2020-06-25

## 2020-06-25 RX ORDER — GABAPENTIN 400 MG/1
400 CAPSULE ORAL
Qty: 60 CAPSULE | Refills: 3 | Status: SHIPPED | OUTPATIENT
Start: 2020-06-25

## 2020-06-25 NOTE — PROGRESS NOTES
Subjective   Leonie Laws is a 79 y.o. female.     History of Present Illness needs some medication renewals.  Having recurrent episodic lower urinary tract outlet symptoms.  Having recurrent eye redness irritation seems related to outdoor activities consistent with allergy.  No known exposures.  Stays fairly active.  Utilizing medications as reconciled.  Still struggles with the episodic neuralgia pain.  The following portions of the patient's history were reviewed and updated as appropriate: allergies, past family history, past medical history, past social history, past surgical history and problem list.    Review of Systems  See history of Present Illness     Objective     Physical Exam   Constitutional: She is oriented to person, place, and time. She appears well-developed and well-nourished.   HENT:   Head: Normocephalic.   Eyes: Pupils are equal, round, and reactive to light. EOM are normal.   Bilateral conjunctival injection   Neck: Normal range of motion. Neck supple. No tracheal deviation present. No thyromegaly present.   Cardiovascular: Normal rate, regular rhythm and normal heart sounds.   No murmur heard.  Pulmonary/Chest: Effort normal and breath sounds normal.   Neurological: She is alert and oriented to person, place, and time.   Skin: Skin is warm and dry.   Psychiatric: She has a normal mood and affect.   Vitals reviewed.      PHQ-9 Total Score: 0    Patient's Body mass index is 22.96 kg/m². BMI is within normal parameters. No follow-up required..   (Normal BMI:  18.5-24.9, OW 25-29.9, Obesity 30 or greater)      Assessment/Plan     Leonie was seen today for redness in eyes.    Diagnoses and all orders for this visit:    Trigeminal neuralgia  -     gabapentin (Neurontin) 400 MG capsule; Take 1 capsule by mouth 2 (Two) Times a Day.    Age-related osteoporosis without current pathological fracture  -     estradiol (MINIVELLE, VIVELLE-DOT) 0.05 MG/24HR patch; Place 1 patch on the skin as directed by  provider 1 (One) Time Per Week.    Anxiety  -     LORazepam (ATIVAN) 2 MG tablet; Take 1 tablet by mouth At Night As Needed for Anxiety.    Other orders  -     olopatadine (PATANOL) 0.1 % ophthalmic solution; Administer 1 drop to both eyes 2 (Two) Times a Day.  -     nitrofurantoin, macrocrystal-monohydrate, (MACROBID) 100 MG capsule; Take 1 capsule by mouth 2 (Two) Times a Day for 7 days.      Medications as noted.  Will authorize antibiotic for urinary symptoms as well as topical antihistamine for eyes.  Counseled regarding medication compliance and utilization.  We will ask you to follow-up later as needed.  Let us know if current acute symptoms do not resolve.    As part of this patient's treatment plan I am prescribing controlled substances. The patient has been made aware of appropriate use of such medications, including potential risk of somnolence, limited ability to drive and/or work safely, and potential for dependence or overdose. It has also been made clear that these medications are for use by this patient only, without concomitant use of alcohol or other substances unless prescribed.  Patient has completed prescribing agreement detailing terms of continued prescribing of controlled substances, including monitoring CIRILO reports, urine drug screening, and pill counts if necessary. The patient is aware that inappropriate use will results in cessation of prescribing such medications.  CIRILO report has been reviewed.  CIRILO is updated every 3 months.  History and physical exam exhibit continued safe and appropriate use of controlled substances.                      This document has been electronically signed by Milton Morales MD   June 25, 2020 13:27

## 2020-07-16 ENCOUNTER — TELEPHONE (OUTPATIENT)
Dept: FAMILY MEDICINE CLINIC | Facility: CLINIC | Age: 80
End: 2020-07-16

## 2020-10-23 ENCOUNTER — PATIENT OUTREACH (OUTPATIENT)
Dept: PHARMACY | Facility: HOSPITAL | Age: 80
End: 2020-10-23

## 2020-10-23 NOTE — OUTREACH NOTE
Medication Adherence Call     Patient was called today to discuss medication adherence to her statin medication. Pt is prescribed simvastatin daily. Pt reports taking medication every other day. Pt denies any side effects on daily dosing but instead fears getting muscle pains/ bad lab work back. Encouraged pt to talk with doctor about decreasing overall daily dose if concerned for side effects and to let him know she has been talking every other day. Pt reports no other medication issues at this time.    Thank you,   Emperatriz Bennett, PharmD  Pharmacy Resident   10/23/2020  15:33 EDT

## 2021-02-04 ENCOUNTER — IMMUNIZATION (OUTPATIENT)
Dept: VACCINE CLINIC | Facility: HOSPITAL | Age: 81
End: 2021-02-04

## 2021-02-04 PROCEDURE — 0001A: CPT | Performed by: INTERNAL MEDICINE

## 2021-02-04 PROCEDURE — 91300 HC SARSCOV02 VAC 30MCG/0.3ML IM: CPT | Performed by: INTERNAL MEDICINE

## 2021-02-25 ENCOUNTER — IMMUNIZATION (OUTPATIENT)
Dept: VACCINE CLINIC | Facility: HOSPITAL | Age: 81
End: 2021-02-25

## 2021-02-25 PROCEDURE — 91300 HC SARSCOV02 VAC 30MCG/0.3ML IM: CPT | Performed by: INTERNAL MEDICINE

## 2021-02-25 PROCEDURE — 0002A: CPT | Performed by: INTERNAL MEDICINE

## 2021-10-05 DIAGNOSIS — G50.0 TRIGEMINAL NEURALGIA: ICD-10-CM

## 2021-10-05 RX ORDER — GABAPENTIN 400 MG/1
CAPSULE ORAL
Qty: 30 CAPSULE | Refills: 1 | OUTPATIENT
Start: 2021-10-05

## 2021-10-25 RX ORDER — ESTRADIOL 0.05 MG/D
PATCH TRANSDERMAL
Qty: 4 PATCH | Refills: 2 | Status: SHIPPED | OUTPATIENT
Start: 2021-10-25

## 2021-10-25 NOTE — TELEPHONE ENCOUNTER
Please contact pharmacy.  Is there a new prescriber.  I have not renewed this medication in over 1 year.

## 2021-10-25 NOTE — TELEPHONE ENCOUNTER
Called spoke with Barbi at [facility] she stated patient has not filled the since Last year and there have been no other providers send the medication in.     Called patient has not filled with any other pharmacy has not been using them but feels like she needs them. When asked what has changed to make her think she needs them now patient stated she is getting to the age that she probably doesn't she just wanted them.

## 2024-01-22 ENCOUNTER — APPOINTMENT (OUTPATIENT)
Dept: GENERAL RADIOLOGY | Facility: HOSPITAL | Age: 84
End: 2024-01-22
Payer: MEDICARE

## 2024-01-22 ENCOUNTER — APPOINTMENT (OUTPATIENT)
Dept: CT IMAGING | Facility: HOSPITAL | Age: 84
End: 2024-01-22
Payer: MEDICARE

## 2024-01-22 ENCOUNTER — HOSPITAL ENCOUNTER (INPATIENT)
Facility: HOSPITAL | Age: 84
LOS: 9 days | Discharge: NURSING, REHAB OR LONG-TERM CARE | End: 2024-01-31
Attending: EMERGENCY MEDICINE | Admitting: INTERNAL MEDICINE
Payer: MEDICARE

## 2024-01-22 DIAGNOSIS — E86.0 DEHYDRATION: ICD-10-CM

## 2024-01-22 DIAGNOSIS — R78.81 BACTEREMIA: ICD-10-CM

## 2024-01-22 DIAGNOSIS — R53.81 DEBILITY: ICD-10-CM

## 2024-01-22 DIAGNOSIS — R11.2 NAUSEA AND VOMITING, UNSPECIFIED VOMITING TYPE: Primary | ICD-10-CM

## 2024-01-22 DIAGNOSIS — G50.0 TRIGEMINAL NEURALGIA: ICD-10-CM

## 2024-01-22 DIAGNOSIS — F41.9 ANXIETY: ICD-10-CM

## 2024-01-22 PROBLEM — R11.10 VOMITING: Status: ACTIVE | Noted: 2024-01-22

## 2024-01-22 LAB
ALBUMIN SERPL-MCNC: 2.7 G/DL (ref 3.5–5.2)
ALBUMIN/GLOB SERPL: 0.5 G/DL
ALP SERPL-CCNC: 118 U/L (ref 39–117)
ALT SERPL W P-5'-P-CCNC: 32 U/L (ref 1–33)
ANION GAP SERPL CALCULATED.3IONS-SCNC: 14.1 MMOL/L (ref 5–15)
AST SERPL-CCNC: 38 U/L (ref 1–32)
B PARAPERT DNA SPEC QL NAA+PROBE: NOT DETECTED
B PERT DNA SPEC QL NAA+PROBE: NOT DETECTED
BACTERIA UR QL AUTO: ABNORMAL /HPF
BASOPHILS # BLD AUTO: 0.08 10*3/MM3 (ref 0–0.2)
BASOPHILS NFR BLD AUTO: 0.4 % (ref 0–1.5)
BILIRUB SERPL-MCNC: 0.6 MG/DL (ref 0–1.2)
BILIRUB UR QL STRIP: NEGATIVE
BUN SERPL-MCNC: 16 MG/DL (ref 8–23)
BUN/CREAT SERPL: 16.8 (ref 7–25)
C PNEUM DNA NPH QL NAA+NON-PROBE: NOT DETECTED
CALCIUM SPEC-SCNC: 9.7 MG/DL (ref 8.6–10.5)
CHLORIDE SERPL-SCNC: 89 MMOL/L (ref 98–107)
CLARITY UR: ABNORMAL
CO2 SERPL-SCNC: 23.9 MMOL/L (ref 22–29)
COLOR UR: YELLOW
CREAT SERPL-MCNC: 0.95 MG/DL (ref 0.57–1)
CRP SERPL-MCNC: 25.25 MG/DL (ref 0–0.5)
D-LACTATE SERPL-SCNC: 1.8 MMOL/L (ref 0.5–2)
D-LACTATE SERPL-SCNC: 2.2 MMOL/L (ref 0.5–2)
DEPRECATED RDW RBC AUTO: 47.3 FL (ref 37–54)
EGFRCR SERPLBLD CKD-EPI 2021: 59.9 ML/MIN/1.73
EOSINOPHIL # BLD AUTO: 0 10*3/MM3 (ref 0–0.4)
EOSINOPHIL NFR BLD AUTO: 0 % (ref 0.3–6.2)
ERYTHROCYTE [DISTWIDTH] IN BLOOD BY AUTOMATED COUNT: 14.4 % (ref 12.3–15.4)
ERYTHROCYTE [SEDIMENTATION RATE] IN BLOOD: 108 MM/HR (ref 0–30)
FLUAV SUBTYP SPEC NAA+PROBE: NOT DETECTED
FLUBV RNA ISLT QL NAA+PROBE: NOT DETECTED
GLOBULIN UR ELPH-MCNC: 5.5 GM/DL
GLUCOSE BLDC GLUCOMTR-MCNC: 224 MG/DL (ref 70–130)
GLUCOSE SERPL-MCNC: 233 MG/DL (ref 65–99)
GLUCOSE UR STRIP-MCNC: NEGATIVE MG/DL
HADV DNA SPEC NAA+PROBE: NOT DETECTED
HBA1C MFR BLD: 5.9 % (ref 4.8–5.6)
HCOV 229E RNA SPEC QL NAA+PROBE: NOT DETECTED
HCOV HKU1 RNA SPEC QL NAA+PROBE: NOT DETECTED
HCOV NL63 RNA SPEC QL NAA+PROBE: NOT DETECTED
HCOV OC43 RNA SPEC QL NAA+PROBE: NOT DETECTED
HCT VFR BLD AUTO: 38.4 % (ref 34–46.6)
HGB BLD-MCNC: 12.6 G/DL (ref 12–15.9)
HGB UR QL STRIP.AUTO: ABNORMAL
HMPV RNA NPH QL NAA+NON-PROBE: NOT DETECTED
HOLD SPECIMEN: NORMAL
HOLD SPECIMEN: NORMAL
HPIV1 RNA ISLT QL NAA+PROBE: NOT DETECTED
HPIV2 RNA SPEC QL NAA+PROBE: NOT DETECTED
HPIV3 RNA NPH QL NAA+PROBE: NOT DETECTED
HPIV4 P GENE NPH QL NAA+PROBE: NOT DETECTED
HYALINE CASTS UR QL AUTO: ABNORMAL /LPF
IMM GRANULOCYTES # BLD AUTO: 0.18 10*3/MM3 (ref 0–0.05)
IMM GRANULOCYTES NFR BLD AUTO: 1 % (ref 0–0.5)
KETONES UR QL STRIP: ABNORMAL
LEUKOCYTE ESTERASE UR QL STRIP.AUTO: NEGATIVE
LIPASE SERPL-CCNC: 8 U/L (ref 13–60)
LYMPHOCYTES # BLD AUTO: 0.4 10*3/MM3 (ref 0.7–3.1)
LYMPHOCYTES NFR BLD AUTO: 2.1 % (ref 19.6–45.3)
M PNEUMO IGG SER IA-ACNC: NOT DETECTED
MAGNESIUM SERPL-MCNC: 2 MG/DL (ref 1.6–2.4)
MAGNESIUM SERPL-MCNC: 2 MG/DL (ref 1.6–2.4)
MCH RBC QN AUTO: 29.6 PG (ref 26.6–33)
MCHC RBC AUTO-ENTMCNC: 32.8 G/DL (ref 31.5–35.7)
MCV RBC AUTO: 90.1 FL (ref 79–97)
MONOCYTES # BLD AUTO: 0.91 10*3/MM3 (ref 0.1–0.9)
MONOCYTES NFR BLD AUTO: 4.8 % (ref 5–12)
NEUTROPHILS NFR BLD AUTO: 17.21 10*3/MM3 (ref 1.7–7)
NEUTROPHILS NFR BLD AUTO: 91.7 % (ref 42.7–76)
NITRITE UR QL STRIP: NEGATIVE
NRBC BLD AUTO-RTO: 0 /100 WBC (ref 0–0.2)
PH UR STRIP.AUTO: 6.5 [PH] (ref 5–8)
PHOSPHATE SERPL-MCNC: 3.7 MG/DL (ref 2.5–4.5)
PLATELET # BLD AUTO: 399 10*3/MM3 (ref 140–450)
PMV BLD AUTO: 9.7 FL (ref 6–12)
POTASSIUM SERPL-SCNC: 4.6 MMOL/L (ref 3.5–5.2)
PROT SERPL-MCNC: 8.2 G/DL (ref 6–8.5)
PROT UR QL STRIP: ABNORMAL
RBC # BLD AUTO: 4.26 10*6/MM3 (ref 3.77–5.28)
RBC # UR STRIP: ABNORMAL /HPF
REF LAB TEST METHOD: ABNORMAL
RHINOVIRUS RNA SPEC NAA+PROBE: NOT DETECTED
RSV RNA NPH QL NAA+NON-PROBE: NOT DETECTED
SARS-COV-2 RNA NPH QL NAA+NON-PROBE: NOT DETECTED
SODIUM SERPL-SCNC: 127 MMOL/L (ref 136–145)
SP GR UR STRIP: >1.03 (ref 1–1.03)
SQUAMOUS #/AREA URNS HPF: ABNORMAL /HPF
TROPONIN T SERPL HS-MCNC: <6 NG/L
TSH SERPL DL<=0.05 MIU/L-ACNC: 0.9 UIU/ML (ref 0.27–4.2)
UROBILINOGEN UR QL STRIP: ABNORMAL
WBC # UR STRIP: ABNORMAL /HPF
WBC NRBC COR # BLD AUTO: 18.78 10*3/MM3 (ref 3.4–10.8)
WHOLE BLOOD HOLD COAG: NORMAL
WHOLE BLOOD HOLD SPECIMEN: NORMAL

## 2024-01-22 PROCEDURE — 83605 ASSAY OF LACTIC ACID: CPT | Performed by: NURSE PRACTITIONER

## 2024-01-22 PROCEDURE — 86140 C-REACTIVE PROTEIN: CPT | Performed by: NURSE PRACTITIONER

## 2024-01-22 PROCEDURE — 87181 SC STD AGAR DILUTION PER AGT: CPT | Performed by: NURSE PRACTITIONER

## 2024-01-22 PROCEDURE — 99223 1ST HOSP IP/OBS HIGH 75: CPT

## 2024-01-22 PROCEDURE — 25010000002 VANCOMYCIN 5 G RECONSTITUTED SOLUTION: Performed by: NURSE PRACTITIONER

## 2024-01-22 PROCEDURE — 25810000003 LACTATED RINGERS SOLUTION: Performed by: NURSE PRACTITIONER

## 2024-01-22 PROCEDURE — 93010 ELECTROCARDIOGRAM REPORT: CPT | Performed by: INTERNAL MEDICINE

## 2024-01-22 PROCEDURE — 99285 EMERGENCY DEPT VISIT HI MDM: CPT

## 2024-01-22 PROCEDURE — 81001 URINALYSIS AUTO W/SCOPE: CPT | Performed by: NURSE PRACTITIONER

## 2024-01-22 PROCEDURE — 87147 CULTURE TYPE IMMUNOLOGIC: CPT | Performed by: NURSE PRACTITIONER

## 2024-01-22 PROCEDURE — 25010000002 METRONIDAZOLE 500 MG/100ML SOLUTION: Performed by: NURSE PRACTITIONER

## 2024-01-22 PROCEDURE — 25010000002 AZTREONAM PER 500 MG: Performed by: NURSE PRACTITIONER

## 2024-01-22 PROCEDURE — 80053 COMPREHEN METABOLIC PANEL: CPT | Performed by: NURSE PRACTITIONER

## 2024-01-22 PROCEDURE — 87150 DNA/RNA AMPLIFIED PROBE: CPT | Performed by: NURSE PRACTITIONER

## 2024-01-22 PROCEDURE — 87186 SC STD MICRODIL/AGAR DIL: CPT | Performed by: NURSE PRACTITIONER

## 2024-01-22 PROCEDURE — 25510000001 IOPAMIDOL 61 % SOLUTION: Performed by: EMERGENCY MEDICINE

## 2024-01-22 PROCEDURE — 36415 COLL VENOUS BLD VENIPUNCTURE: CPT

## 2024-01-22 PROCEDURE — 70450 CT HEAD/BRAIN W/O DYE: CPT

## 2024-01-22 PROCEDURE — 25810000003 SEPSIS FLUID NS 0.9 % SOLUTION: Performed by: NURSE PRACTITIONER

## 2024-01-22 PROCEDURE — 84443 ASSAY THYROID STIM HORMONE: CPT | Performed by: INTERNAL MEDICINE

## 2024-01-22 PROCEDURE — 71045 X-RAY EXAM CHEST 1 VIEW: CPT | Performed by: RADIOLOGY

## 2024-01-22 PROCEDURE — 82948 REAGENT STRIP/BLOOD GLUCOSE: CPT

## 2024-01-22 PROCEDURE — 83036 HEMOGLOBIN GLYCOSYLATED A1C: CPT | Performed by: NURSE PRACTITIONER

## 2024-01-22 PROCEDURE — 85025 COMPLETE CBC W/AUTO DIFF WBC: CPT | Performed by: NURSE PRACTITIONER

## 2024-01-22 PROCEDURE — 70450 CT HEAD/BRAIN W/O DYE: CPT | Performed by: RADIOLOGY

## 2024-01-22 PROCEDURE — 84484 ASSAY OF TROPONIN QUANT: CPT | Performed by: NURSE PRACTITIONER

## 2024-01-22 PROCEDURE — 25810000003 SODIUM CHLORIDE 0.9 % SOLUTION: Performed by: NURSE PRACTITIONER

## 2024-01-22 PROCEDURE — 93005 ELECTROCARDIOGRAM TRACING: CPT | Performed by: NURSE PRACTITIONER

## 2024-01-22 PROCEDURE — 84100 ASSAY OF PHOSPHORUS: CPT | Performed by: NURSE PRACTITIONER

## 2024-01-22 PROCEDURE — P9612 CATHETERIZE FOR URINE SPEC: HCPCS

## 2024-01-22 PROCEDURE — 83735 ASSAY OF MAGNESIUM: CPT | Performed by: INTERNAL MEDICINE

## 2024-01-22 PROCEDURE — 25010000002 ONDANSETRON PER 1 MG: Performed by: EMERGENCY MEDICINE

## 2024-01-22 PROCEDURE — 87040 BLOOD CULTURE FOR BACTERIA: CPT | Performed by: NURSE PRACTITIONER

## 2024-01-22 PROCEDURE — 83690 ASSAY OF LIPASE: CPT | Performed by: NURSE PRACTITIONER

## 2024-01-22 PROCEDURE — 87154 CUL TYP ID BLD PTHGN 6+ TRGT: CPT | Performed by: NURSE PRACTITIONER

## 2024-01-22 PROCEDURE — 74177 CT ABD & PELVIS W/CONTRAST: CPT

## 2024-01-22 PROCEDURE — 85652 RBC SED RATE AUTOMATED: CPT | Performed by: NURSE PRACTITIONER

## 2024-01-22 PROCEDURE — 83735 ASSAY OF MAGNESIUM: CPT | Performed by: NURSE PRACTITIONER

## 2024-01-22 PROCEDURE — 74177 CT ABD & PELVIS W/CONTRAST: CPT | Performed by: RADIOLOGY

## 2024-01-22 PROCEDURE — 71045 X-RAY EXAM CHEST 1 VIEW: CPT

## 2024-01-22 PROCEDURE — 0202U NFCT DS 22 TRGT SARS-COV-2: CPT

## 2024-01-22 RX ORDER — BISACODYL 10 MG
10 SUPPOSITORY, RECTAL RECTAL DAILY PRN
Status: DISCONTINUED | OUTPATIENT
Start: 2024-01-22 | End: 2024-01-31 | Stop reason: HOSPADM

## 2024-01-22 RX ORDER — ACETAMINOPHEN 500 MG
1000 TABLET ORAL ONCE
Status: COMPLETED | OUTPATIENT
Start: 2024-01-22 | End: 2024-01-22

## 2024-01-22 RX ORDER — DEXTROSE MONOHYDRATE 25 G/50ML
25 INJECTION, SOLUTION INTRAVENOUS
Status: DISCONTINUED | OUTPATIENT
Start: 2024-01-22 | End: 2024-01-31 | Stop reason: HOSPADM

## 2024-01-22 RX ORDER — NICOTINE POLACRILEX 4 MG
15 LOZENGE BUCCAL
Status: DISCONTINUED | OUTPATIENT
Start: 2024-01-22 | End: 2024-01-31 | Stop reason: HOSPADM

## 2024-01-22 RX ORDER — BISACODYL 5 MG/1
5 TABLET, DELAYED RELEASE ORAL DAILY PRN
Status: DISCONTINUED | OUTPATIENT
Start: 2024-01-22 | End: 2024-01-31 | Stop reason: HOSPADM

## 2024-01-22 RX ORDER — ACETAMINOPHEN 650 MG/1
650 SUPPOSITORY RECTAL EVERY 4 HOURS PRN
Status: DISCONTINUED | OUTPATIENT
Start: 2024-01-22 | End: 2024-01-31 | Stop reason: HOSPADM

## 2024-01-22 RX ORDER — ONDANSETRON 2 MG/ML
4 INJECTION INTRAMUSCULAR; INTRAVENOUS EVERY 6 HOURS PRN
Status: DISCONTINUED | OUTPATIENT
Start: 2024-01-22 | End: 2024-01-31 | Stop reason: HOSPADM

## 2024-01-22 RX ORDER — INSULIN LISPRO 100 [IU]/ML
2-7 INJECTION, SOLUTION INTRAVENOUS; SUBCUTANEOUS
Status: DISCONTINUED | OUTPATIENT
Start: 2024-01-23 | End: 2024-01-31 | Stop reason: HOSPADM

## 2024-01-22 RX ORDER — NITROGLYCERIN 0.4 MG/1
0.4 TABLET SUBLINGUAL
Status: DISCONTINUED | OUTPATIENT
Start: 2024-01-22 | End: 2024-01-31 | Stop reason: HOSPADM

## 2024-01-22 RX ORDER — ONDANSETRON 2 MG/ML
4 INJECTION INTRAMUSCULAR; INTRAVENOUS ONCE
Status: COMPLETED | OUTPATIENT
Start: 2024-01-22 | End: 2024-01-22

## 2024-01-22 RX ORDER — HEPARIN SODIUM 5000 [USP'U]/ML
5000 INJECTION, SOLUTION INTRAVENOUS; SUBCUTANEOUS EVERY 12 HOURS SCHEDULED
Status: DISCONTINUED | OUTPATIENT
Start: 2024-01-23 | End: 2024-01-31 | Stop reason: HOSPADM

## 2024-01-22 RX ORDER — GLUCAGON 1 MG/ML
1 KIT INJECTION
Status: DISCONTINUED | OUTPATIENT
Start: 2024-01-22 | End: 2024-01-31 | Stop reason: HOSPADM

## 2024-01-22 RX ORDER — METRONIDAZOLE 500 MG/100ML
500 INJECTION, SOLUTION INTRAVENOUS EVERY 8 HOURS
Qty: 2100 ML | Refills: 0 | Status: DISCONTINUED | OUTPATIENT
Start: 2024-01-23 | End: 2024-01-23

## 2024-01-22 RX ORDER — AMOXICILLIN 250 MG
2 CAPSULE ORAL 2 TIMES DAILY
Status: DISCONTINUED | OUTPATIENT
Start: 2024-01-23 | End: 2024-01-31 | Stop reason: HOSPADM

## 2024-01-22 RX ORDER — POLYETHYLENE GLYCOL 3350 17 G/17G
17 POWDER, FOR SOLUTION ORAL DAILY PRN
Status: DISCONTINUED | OUTPATIENT
Start: 2024-01-22 | End: 2024-01-31 | Stop reason: HOSPADM

## 2024-01-22 RX ORDER — METRONIDAZOLE 500 MG/100ML
500 INJECTION, SOLUTION INTRAVENOUS ONCE
Status: COMPLETED | OUTPATIENT
Start: 2024-01-22 | End: 2024-01-22

## 2024-01-22 RX ORDER — ACETAMINOPHEN 325 MG/1
650 TABLET ORAL EVERY 6 HOURS PRN
Status: DISCONTINUED | OUTPATIENT
Start: 2024-01-22 | End: 2024-01-31 | Stop reason: HOSPADM

## 2024-01-22 RX ORDER — SODIUM CHLORIDE 0.9 % (FLUSH) 0.9 %
10 SYRINGE (ML) INJECTION EVERY 12 HOURS SCHEDULED
Status: DISCONTINUED | OUTPATIENT
Start: 2024-01-23 | End: 2024-01-31 | Stop reason: HOSPADM

## 2024-01-22 RX ORDER — SODIUM CHLORIDE 0.9 % (FLUSH) 0.9 %
10 SYRINGE (ML) INJECTION AS NEEDED
Status: DISCONTINUED | OUTPATIENT
Start: 2024-01-22 | End: 2024-01-31 | Stop reason: HOSPADM

## 2024-01-22 RX ORDER — SODIUM CHLORIDE 9 MG/ML
40 INJECTION, SOLUTION INTRAVENOUS AS NEEDED
Status: DISCONTINUED | OUTPATIENT
Start: 2024-01-22 | End: 2024-01-31 | Stop reason: HOSPADM

## 2024-01-22 RX ORDER — SODIUM CHLORIDE 9 MG/ML
100 INJECTION, SOLUTION INTRAVENOUS CONTINUOUS
Status: DISCONTINUED | OUTPATIENT
Start: 2024-01-23 | End: 2024-01-28

## 2024-01-22 RX ADMIN — VANCOMYCIN HYDROCHLORIDE 1250 MG: 5 INJECTION, POWDER, LYOPHILIZED, FOR SOLUTION INTRAVENOUS at 18:56

## 2024-01-22 RX ADMIN — METRONIDAZOLE 500 MG: 500 INJECTION, SOLUTION INTRAVENOUS at 17:54

## 2024-01-22 RX ADMIN — ACETAMINOPHEN 1000 MG: 500 TABLET ORAL at 21:59

## 2024-01-22 RX ADMIN — SODIUM CHLORIDE 1743 ML: 9 INJECTION, SOLUTION INTRAVENOUS at 17:53

## 2024-01-22 RX ADMIN — SODIUM CHLORIDE, POTASSIUM CHLORIDE, SODIUM LACTATE AND CALCIUM CHLORIDE 1000 ML: 600; 310; 30; 20 INJECTION, SOLUTION INTRAVENOUS at 15:41

## 2024-01-22 RX ADMIN — AZTREONAM 2 G: 2 INJECTION, POWDER, LYOPHILIZED, FOR SOLUTION INTRAMUSCULAR; INTRAVENOUS at 17:21

## 2024-01-22 RX ADMIN — IOPAMIDOL 89 ML: 612 INJECTION, SOLUTION INTRAVENOUS at 17:16

## 2024-01-22 RX ADMIN — ONDANSETRON 4 MG: 2 INJECTION INTRAMUSCULAR; INTRAVENOUS at 20:45

## 2024-01-22 NOTE — ED NOTES
Patient presents to the ER via [facility] due to increasing generalized weakness over the past 3 weeks and vomiting that began last date. Patient's family states that they have concerns that patient has dementia but denies a diagnosis. Patient's family states that patient's confusion has worsened, reports that they have had difficulty getting patient to eat and drink, reports that patient then began vomiting yesterday and they are concerned patient is going to aspirate.

## 2024-01-23 LAB
ALBUMIN SERPL-MCNC: 2.3 G/DL (ref 3.5–5.2)
ALBUMIN/GLOB SERPL: 0.5 G/DL
ALP SERPL-CCNC: 140 U/L (ref 39–117)
ALT SERPL W P-5'-P-CCNC: 28 U/L (ref 1–33)
ANION GAP SERPL CALCULATED.3IONS-SCNC: 7.3 MMOL/L (ref 5–15)
AST SERPL-CCNC: 36 U/L (ref 1–32)
B PARAPERT DNA SPEC QL NAA+PROBE: NOT DETECTED
B PERT DNA SPEC QL NAA+PROBE: NOT DETECTED
BACTERIA BLD CULT: ABNORMAL
BASOPHILS # BLD AUTO: 0.04 10*3/MM3 (ref 0–0.2)
BASOPHILS NFR BLD AUTO: 0.2 % (ref 0–1.5)
BILIRUB SERPL-MCNC: 0.3 MG/DL (ref 0–1.2)
BOTTLE TYPE: ABNORMAL
BUN SERPL-MCNC: 17 MG/DL (ref 8–23)
BUN/CREAT SERPL: 20 (ref 7–25)
C PNEUM DNA NPH QL NAA+NON-PROBE: NOT DETECTED
CALCIUM SPEC-SCNC: 8.7 MG/DL (ref 8.6–10.5)
CHLORIDE SERPL-SCNC: 97 MMOL/L (ref 98–107)
CO2 SERPL-SCNC: 25.7 MMOL/L (ref 22–29)
CREAT SERPL-MCNC: 0.85 MG/DL (ref 0.57–1)
DEPRECATED RDW RBC AUTO: 45.8 FL (ref 37–54)
EGFRCR SERPLBLD CKD-EPI 2021: 68.5 ML/MIN/1.73
EOSINOPHIL # BLD AUTO: 0 10*3/MM3 (ref 0–0.4)
EOSINOPHIL NFR BLD AUTO: 0 % (ref 0.3–6.2)
ERYTHROCYTE [DISTWIDTH] IN BLOOD BY AUTOMATED COUNT: 14.2 % (ref 12.3–15.4)
FLUAV SUBTYP SPEC NAA+PROBE: NOT DETECTED
FLUBV RNA ISLT QL NAA+PROBE: NOT DETECTED
FOLATE SERPL-MCNC: 2.57 NG/ML (ref 4.78–24.2)
GLOBULIN UR ELPH-MCNC: 4.2 GM/DL
GLUCOSE BLDC GLUCOMTR-MCNC: 114 MG/DL (ref 70–130)
GLUCOSE BLDC GLUCOMTR-MCNC: 118 MG/DL (ref 70–130)
GLUCOSE BLDC GLUCOMTR-MCNC: 98 MG/DL (ref 70–130)
GLUCOSE BLDC GLUCOMTR-MCNC: 98 MG/DL (ref 70–130)
GLUCOSE SERPL-MCNC: 138 MG/DL (ref 65–99)
HADV DNA SPEC NAA+PROBE: NOT DETECTED
HCOV 229E RNA SPEC QL NAA+PROBE: NOT DETECTED
HCOV HKU1 RNA SPEC QL NAA+PROBE: NOT DETECTED
HCOV NL63 RNA SPEC QL NAA+PROBE: NOT DETECTED
HCOV OC43 RNA SPEC QL NAA+PROBE: NOT DETECTED
HCT VFR BLD AUTO: 27.9 % (ref 34–46.6)
HGB BLD-MCNC: 9.3 G/DL (ref 12–15.9)
HMPV RNA NPH QL NAA+NON-PROBE: NOT DETECTED
HPIV1 RNA ISLT QL NAA+PROBE: NOT DETECTED
HPIV2 RNA SPEC QL NAA+PROBE: NOT DETECTED
HPIV3 RNA NPH QL NAA+PROBE: NOT DETECTED
HPIV4 P GENE NPH QL NAA+PROBE: NOT DETECTED
IMM GRANULOCYTES # BLD AUTO: 0.12 10*3/MM3 (ref 0–0.05)
IMM GRANULOCYTES NFR BLD AUTO: 0.7 % (ref 0–0.5)
LYMPHOCYTES # BLD AUTO: 0.75 10*3/MM3 (ref 0.7–3.1)
LYMPHOCYTES NFR BLD AUTO: 4.6 % (ref 19.6–45.3)
M PNEUMO IGG SER IA-ACNC: NOT DETECTED
MCH RBC QN AUTO: 29.2 PG (ref 26.6–33)
MCHC RBC AUTO-ENTMCNC: 33.3 G/DL (ref 31.5–35.7)
MCV RBC AUTO: 87.7 FL (ref 79–97)
MONOCYTES # BLD AUTO: 1 10*3/MM3 (ref 0.1–0.9)
MONOCYTES NFR BLD AUTO: 6.2 % (ref 5–12)
NEUTROPHILS NFR BLD AUTO: 14.25 10*3/MM3 (ref 1.7–7)
NEUTROPHILS NFR BLD AUTO: 88.3 % (ref 42.7–76)
NRBC BLD AUTO-RTO: 0 /100 WBC (ref 0–0.2)
PLATELET # BLD AUTO: 340 10*3/MM3 (ref 140–450)
PMV BLD AUTO: 10.4 FL (ref 6–12)
POTASSIUM SERPL-SCNC: 3.9 MMOL/L (ref 3.5–5.2)
PROT SERPL-MCNC: 6.5 G/DL (ref 6–8.5)
QT INTERVAL: 348 MS
QTC INTERVAL: 446 MS
RBC # BLD AUTO: 3.18 10*6/MM3 (ref 3.77–5.28)
RHINOVIRUS RNA SPEC NAA+PROBE: NOT DETECTED
RSV RNA NPH QL NAA+NON-PROBE: NOT DETECTED
SARS-COV-2 RNA NPH QL NAA+NON-PROBE: NOT DETECTED
SODIUM SERPL-SCNC: 130 MMOL/L (ref 136–145)
VIT B12 BLD-MCNC: 889 PG/ML (ref 211–946)
WBC NRBC COR # BLD AUTO: 16.16 10*3/MM3 (ref 3.4–10.8)

## 2024-01-23 PROCEDURE — 80053 COMPREHEN METABOLIC PANEL: CPT | Performed by: INTERNAL MEDICINE

## 2024-01-23 PROCEDURE — 25010000002 AZTREONAM PER 500 MG: Performed by: INTERNAL MEDICINE

## 2024-01-23 PROCEDURE — 25810000003 SODIUM CHLORIDE 0.9 % SOLUTION: Performed by: INTERNAL MEDICINE

## 2024-01-23 PROCEDURE — 25010000002 HEPARIN (PORCINE) PER 1000 UNITS: Performed by: INTERNAL MEDICINE

## 2024-01-23 PROCEDURE — 25810000003 SODIUM CHLORIDE 0.9 % SOLUTION 250 ML FLEX CONT: Performed by: INTERNAL MEDICINE

## 2024-01-23 PROCEDURE — 99233 SBSQ HOSP IP/OBS HIGH 50: CPT | Performed by: INTERNAL MEDICINE

## 2024-01-23 PROCEDURE — 25010000002 VANCOMYCIN 1 G RECONSTITUTED SOLUTION 1 EACH VIAL: Performed by: INTERNAL MEDICINE

## 2024-01-23 PROCEDURE — 82607 VITAMIN B-12: CPT | Performed by: INTERNAL MEDICINE

## 2024-01-23 PROCEDURE — 82746 ASSAY OF FOLIC ACID SERUM: CPT | Performed by: INTERNAL MEDICINE

## 2024-01-23 PROCEDURE — 82948 REAGENT STRIP/BLOOD GLUCOSE: CPT

## 2024-01-23 PROCEDURE — 97162 PT EVAL MOD COMPLEX 30 MIN: CPT

## 2024-01-23 PROCEDURE — 25010000002 PIPERACILLIN SOD-TAZOBACTAM PER 1 G: Performed by: INTERNAL MEDICINE

## 2024-01-23 PROCEDURE — 85025 COMPLETE CBC W/AUTO DIFF WBC: CPT | Performed by: INTERNAL MEDICINE

## 2024-01-23 PROCEDURE — 25010000002 METRONIDAZOLE 500 MG/100ML SOLUTION: Performed by: INTERNAL MEDICINE

## 2024-01-23 PROCEDURE — 25010000002 ONDANSETRON PER 1 MG: Performed by: INTERNAL MEDICINE

## 2024-01-23 PROCEDURE — 92610 EVALUATE SWALLOWING FUNCTION: CPT

## 2024-01-23 PROCEDURE — 0202U NFCT DS 22 TRGT SARS-COV-2: CPT | Performed by: INTERNAL MEDICINE

## 2024-01-23 PROCEDURE — 99221 1ST HOSP IP/OBS SF/LOW 40: CPT | Performed by: PSYCHIATRY & NEUROLOGY

## 2024-01-23 RX ORDER — BUPROPION HYDROCHLORIDE 150 MG/1
150 TABLET ORAL DAILY
COMMUNITY

## 2024-01-23 RX ORDER — ATORVASTATIN CALCIUM 20 MG/1
20 TABLET, FILM COATED ORAL DAILY
COMMUNITY

## 2024-01-23 RX ORDER — ATORVASTATIN CALCIUM 20 MG/1
20 TABLET, FILM COATED ORAL DAILY
Status: DISCONTINUED | OUTPATIENT
Start: 2024-01-24 | End: 2024-01-31 | Stop reason: HOSPADM

## 2024-01-23 RX ORDER — MEGESTROL ACETATE 40 MG/ML
200 SUSPENSION ORAL 3 TIMES DAILY
Status: DISCONTINUED | OUTPATIENT
Start: 2024-01-23 | End: 2024-01-31 | Stop reason: HOSPADM

## 2024-01-23 RX ORDER — OMEPRAZOLE 20 MG/1
20 CAPSULE, DELAYED RELEASE ORAL DAILY
COMMUNITY

## 2024-01-23 RX ORDER — ONDANSETRON 4 MG/1
8 TABLET, FILM COATED ORAL EVERY 8 HOURS PRN
Status: DISCONTINUED | OUTPATIENT
Start: 2024-01-23 | End: 2024-01-31 | Stop reason: HOSPADM

## 2024-01-23 RX ORDER — MECLIZINE HYDROCHLORIDE 25 MG/1
25 TABLET ORAL 3 TIMES DAILY PRN
COMMUNITY

## 2024-01-23 RX ORDER — MECLIZINE HYDROCHLORIDE 25 MG/1
25 TABLET ORAL 3 TIMES DAILY PRN
Status: DISCONTINUED | OUTPATIENT
Start: 2024-01-23 | End: 2024-01-31 | Stop reason: HOSPADM

## 2024-01-23 RX ORDER — BUPROPION HYDROCHLORIDE 150 MG/1
150 TABLET ORAL DAILY
Status: DISCONTINUED | OUTPATIENT
Start: 2024-01-24 | End: 2024-01-31 | Stop reason: HOSPADM

## 2024-01-23 RX ORDER — MEGESTROL ACETATE 40 MG/ML
100 SUSPENSION ORAL 3 TIMES DAILY PRN
Status: ON HOLD | COMMUNITY
End: 2024-01-23

## 2024-01-23 RX ORDER — ONDANSETRON HYDROCHLORIDE 8 MG/1
8 TABLET, FILM COATED ORAL EVERY 8 HOURS PRN
COMMUNITY

## 2024-01-23 RX ORDER — LORAZEPAM 2 MG/1
2 TABLET ORAL NIGHTLY
Status: DISCONTINUED | OUTPATIENT
Start: 2024-01-23 | End: 2024-01-31 | Stop reason: HOSPADM

## 2024-01-23 RX ORDER — PANTOPRAZOLE SODIUM 40 MG/1
40 TABLET, DELAYED RELEASE ORAL
Status: DISCONTINUED | OUTPATIENT
Start: 2024-01-24 | End: 2024-01-31 | Stop reason: HOSPADM

## 2024-01-23 RX ORDER — LORAZEPAM 2 MG/1
2 TABLET ORAL NIGHTLY
Status: ON HOLD | COMMUNITY
End: 2024-01-31 | Stop reason: SDUPTHER

## 2024-01-23 RX ORDER — GABAPENTIN 400 MG/1
400 CAPSULE ORAL NIGHTLY
Status: ON HOLD | COMMUNITY
End: 2024-01-31 | Stop reason: SDUPTHER

## 2024-01-23 RX ORDER — MEGESTROL ACETATE 40 MG/ML
200 SUSPENSION ORAL 3 TIMES DAILY
COMMUNITY

## 2024-01-23 RX ORDER — GABAPENTIN 400 MG/1
400 CAPSULE ORAL NIGHTLY
Status: DISCONTINUED | OUTPATIENT
Start: 2024-01-23 | End: 2024-01-31 | Stop reason: HOSPADM

## 2024-01-23 RX ADMIN — Medication 10 ML: at 00:28

## 2024-01-23 RX ADMIN — Medication 10 ML: at 08:50

## 2024-01-23 RX ADMIN — METRONIDAZOLE 500 MG: 500 INJECTION, SOLUTION INTRAVENOUS at 01:40

## 2024-01-23 RX ADMIN — ONDANSETRON 4 MG: 2 INJECTION INTRAMUSCULAR; INTRAVENOUS at 22:04

## 2024-01-23 RX ADMIN — LORAZEPAM 2 MG: 2 TABLET ORAL at 23:25

## 2024-01-23 RX ADMIN — GABAPENTIN 400 MG: 400 CAPSULE ORAL at 23:25

## 2024-01-23 RX ADMIN — VANCOMYCIN HYDROCHLORIDE 1000 MG: 1 INJECTION, POWDER, LYOPHILIZED, FOR SOLUTION INTRAVENOUS at 21:48

## 2024-01-23 RX ADMIN — METRONIDAZOLE 500 MG: 500 INJECTION, SOLUTION INTRAVENOUS at 10:00

## 2024-01-23 RX ADMIN — HEPARIN SODIUM 5000 UNITS: 5000 INJECTION INTRAVENOUS; SUBCUTANEOUS at 00:26

## 2024-01-23 RX ADMIN — SODIUM CHLORIDE 100 ML/HR: 9 INJECTION, SOLUTION INTRAVENOUS at 23:33

## 2024-01-23 RX ADMIN — AZTREONAM 2 G: 2 INJECTION, POWDER, LYOPHILIZED, FOR SOLUTION INTRAMUSCULAR; INTRAVENOUS at 00:31

## 2024-01-23 RX ADMIN — HEPARIN SODIUM 5000 UNITS: 5000 INJECTION INTRAVENOUS; SUBCUTANEOUS at 22:03

## 2024-01-23 RX ADMIN — AZTREONAM 2 G: 2 INJECTION, POWDER, LYOPHILIZED, FOR SOLUTION INTRAMUSCULAR; INTRAVENOUS at 08:50

## 2024-01-23 RX ADMIN — PIPERACILLIN SODIUM AND TAZOBACTAM SODIUM 3.38 G: 3; .375 INJECTION, POWDER, LYOPHILIZED, FOR SOLUTION INTRAVENOUS at 21:49

## 2024-01-23 RX ADMIN — SODIUM CHLORIDE 100 ML/HR: 9 INJECTION, SOLUTION INTRAVENOUS at 00:26

## 2024-01-23 RX ADMIN — Medication 10 ML: at 21:48

## 2024-01-23 RX ADMIN — HEPARIN SODIUM 5000 UNITS: 5000 INJECTION INTRAVENOUS; SUBCUTANEOUS at 09:52

## 2024-01-23 RX ADMIN — SODIUM CHLORIDE 100 ML/HR: 9 INJECTION, SOLUTION INTRAVENOUS at 13:15

## 2024-01-23 RX ADMIN — PIPERACILLIN SODIUM AND TAZOBACTAM SODIUM 3.38 G: 3; .375 INJECTION, POWDER, LYOPHILIZED, FOR SOLUTION INTRAVENOUS at 17:00

## 2024-01-23 NOTE — PROGRESS NOTES
Antimicrobial Length of Therapy:    Vancomycin day 2 of 7  Aztreonam day 1 of 5  Metronidazole day 1 of 7    James Lowery PharmD  01/23/24  12:17 EST

## 2024-01-23 NOTE — CASE MANAGEMENT/SOCIAL WORK
Discharge Planning Assessment   [facility]     Patient Name: Leonie Laws  MRN: [id number]  Today's Date: 1/23/2024    Admit Date: 1/22/2024         Discharge Plan       Row Name 01/23/24 1332       Plan    Plan Pt was admitted on 01/22/24. SS received Physician consult for possible NH placement per family request. SS spoke with Pt and Pt's son Yash and dtr in law Cordero at bedside on this date. Pt lives at home with her spouse Justen at [address]. Pt does not utilize home health services. Pt has a walker and wheelchair via private purchase. Pt's PCP is Joe Bowden. Pt's medical POA is son Dr. Giovanny Bond. POA paperwork is not on file. Pt does not have a living will. Pt declined SNF placement. reports coming into the patient's home and finding her covered in vomit and urine.  Patient's  was reportedly sitting in close proximity to the patient with little to no concern regarding patient's overall condition.   Son has also voiced concerns that Pt has been confused at times at home. Pt discussed with Dr. Clarke who states he will order a psych consult for capacity. Pt may need SNF placement vs home with hired caregiver. SS to follow.                  KAT SoW

## 2024-01-23 NOTE — PROGRESS NOTES
Patient is unable to make appropriate medical decisions at this time and would benefit from emergency guardianship.  If there is no improvement and it is believed patient requires a permanent guardian, follow-up should be directed to an outpatient interdisciplinary team, per typical court proceedings.  Follow-up should not be redirected back to Ines Sim, as this evaluation is strictly for emergency guardianship while hospitalized.    Electronically signed by Richar Main MD, 01/23/24, 2:30 PM EST.

## 2024-01-23 NOTE — ED NOTES
Attempted labs 2xs for lactic and pt refused to be poked anymore and I told her I could call lab but she said declined any labs at this time. Reported to GLORIA Zaidi

## 2024-01-23 NOTE — CONSULTS
Adult Nutrition  Assessment/PES    Patient Name:  Leonie Laws  YOB: 1940  MRN: [id number]  Admit Date:  1/22/2024    Assessment Date:  1/23/2024    Comments:  Patient meets criteria for mild malnutrition, related to mild muscle wasting and poor intakes.  Patient is high risk further nutrition decline.  Diet has been advanced and will monitor intakes.  Patient refused oral nutrition supplements.     Reason for Assessment       Row Name 01/23/24 0987          Reason for Assessment    Reason For Assessment identified at risk by screening criteria     Identified At Risk by Screening Criteria MST SCORE 2+                      Labs/Tests/Procedures/Meds       Row Name 01/23/24 1726          Labs/Procedures/Meds    Lab Results Comments Na-130; A1C-5.9; CRP-25.25        Medications    Pertinent Medications Reviewed reviewed                    Physical Findings       Row Name 01/23/24 1729          Physical Findings    Overall Physical Appearance Patient presents with high risk for malnurition.  She is thin, with mild muscle wasting.  she has had weight loss however not significant at this time.  BMI is 18.98, difficulty chewing/swallowing and has been vomiting and possibly aspirated this.   she does have edema BLE and weakness.  She also has a history of dementia and anxiety.                    Estimated/Assessed Needs - Anthropometrics       Row Name 01/23/24 5793          Anthropometrics    Age for Calculations 83     Weight for Calculation 56.6 kg (124 lb 12.5 oz)     Additional Documentation Ideal Body Weight (IBW) (Group)        Ideal Body Weight (IBW)    Ideal Body Weight 63.9kg        Estimated/Assessed Needs    Additional Documentation Fluid Requirements (Group);McKinley-St. Jeor Equation (Group);Protein Requirements (Group)        McKinley-St. Jeor Equation    RMR (McKinley-St. Jeor Equation) 1074.5     McKinley-St. Jeor Activity Factors 1.4 - 1.5     Activity Factors (McKinley-St. Jeor) 1504.3 -  1611.75        Protein Requirements    Weight Used For Protein Calculations 56.6 kg (124 lb 12.5 oz)     Est Protein Requirement Amount (gms/kg) 1.5 gm protein     Estimated Protein Requirements (gms/day) 84.9        Fluid Requirements    Fluid Requirements (mL/day) 1504  1 ml/kcal     RDA Method (mL) 1504                    Nutrition Prescription Ordered       Row Name 01/23/24 1732          Nutrition Prescription PO    Current PO Diet Soft Texture     Texture Chopped     Fluid Consistency Thin                    Evaluation of Received Nutrient/Fluid Intake       Row Name 01/23/24 1733          Fluid Intake Evaluation    Total Fluid Target (mL) 1504     Oral Fluid (mL) 0     Enteral Fluid (mL) 0     Parenteral Fluid (mL) 0     Other Fluid (mL) 2365  IVF     Total Fluid Intake (mL) 2365     % Total Fluid Intake 157.25        PO Evaluation    Number of Days PO Intake Evaluated Insufficient Data  diet just advanced                    Malnutrition Severity Assessment       Row Name 01/23/24 1735          Malnutrition Severity Assessment    Malnutrition Type Acute Disease or Injury - Related Malnutrition        Insufficient Energy Intake     Insufficient Energy Intake Findings Moderate     Insufficient Energy Intake  <75% of est. energy requirement for >7d)        Unintentional Weight Loss     Unintentional Weight Loss Findings Mild        Muscle Loss    Loss of Muscle Mass Findings Mild     [place] Region Moderate - slight depression     Clavicle Bone Region Moderate - some protrusion in females, visible in males     Acromion Bone Region None     Dorsal Hand Region Moderate - slight depression     Patellar Region None     Anterior Thigh Region None     Posterior Calf Region None        Fat Loss    Subcutaneous Fat Loss Findings None     Orbital Region  None     Upper Arm Region None     Thoracic & Lumbar Region None                     Problem/Interventions:   Problem 1       Row Name 01/23/24 1736          Nutrition  Diagnoses Problem 1    Problem 1 Malnutrition     Etiology (related to) Medical Diagnosis     Gastrointestinal Vomiting     Neurological Dementia     Signs/Symptoms (evidenced by) Report of Mnimal PO Intake;Report/Observation;% IBW     Percent (%) IBW 88 %     Reported/Observed By Patient;Family;RN;MD                          Intervention Goal       Row Name 01/23/24 1738          Intervention Goal    General Meet nutritional needs for age/condition     PO Establish PO                    Nutrition Intervention       Row Name 01/23/24 1738          Nutrition Intervention    RD/Tech Action Supplement offered/refused;Follow Tx progress                      Education/Evaluation       Row Name 01/23/24 1738          Education    Education Education not appropriate at this time     Please explain Patient confusion        Monitor/Evaluation    Monitor Per protocol;PO intake;Pertinent labs     Education Follow-up Other (comment)  will continue to follow                     Electronically signed by:  Farheen Youssef RD  01/23/24 17:39 EST

## 2024-01-23 NOTE — PLAN OF CARE
Goal Outcome Evaluation:              Outcome Evaluation: Pt seen for evaluation this date, somewhat cooperative with therapy, did not feel well. Pt may benefit from therapeutic intervention to work on pt safety and independence/mobilization with functional mobility.      Anticipated Discharge Disposition (PT): skilled nursing facility, extended care facility, home with 24/7 care, home with supervision, home with assist, home with home health

## 2024-01-23 NOTE — PROGRESS NOTES
Pharmacy was consulted to dose vancomycin for sepsis. Based on an estimated CrCl of 41mL/min, a vancomycin dose of 1g q24hr has been ordered following the initial 1250mg loading dose. A vancomycin trough will be obtained and the dose will be adjusted as needed to maintain a therapeutic AUC concentration of 400-600mg/L.hr. Thank you for the consult. Pharmacy will continue to follow.     Thank you,   Trudy Galvez, PharmD  00:37 EST

## 2024-01-23 NOTE — THERAPY EVALUATION
Acute Care - Physical Therapy Initial Evaluation   [facility]     Patient Name: Leonie Laws  : 1940  MRN: [id number]  Today's Date: 2024   Onset of Illness/Injury or Date of Surgery: 24 (admission date)  Visit Dx:     ICD-10-CM ICD-9-CM   1. Nausea and vomiting, unspecified vomiting type  R11.2 787.01   2. Dehydration  E86.0 276.51   3. Debility  R53.81 799.3     Patient Active Problem List   Diagnosis    Cystocele    Diverticulosis of large intestine    Hemorrhoids    Hyperlipidemia    Postoperative hypothyroidism    Impaired fasting glucose    Osteoporosis    Rectocele    Trigeminal neuralgia    Anxiety    Vomiting     Past Medical History:   Diagnosis Date    Anxiety     Hyperlipidemia     Trigeminal neuralgia      Past Surgical History:   Procedure Laterality Date    BREAST SURGERY      HYSTERECTOMY       PT Assessment (last 12 hours)       PT Evaluation and Treatment       Row Name 24 0915          Physical Therapy Time and Intention    Document Type evaluation  -     Mode of Treatment physical therapy  -     Patient Effort fair  -     Comment Pt seen for therapy evaluation this date, pt alone in room no family present this AM. Pt is oriented to self and then to place on second answer. Pt not oriented to time, states the day was Tuesday however this was written on the pt's board within line of sight. Pt states she lives with her  at home, grossly (I) PLOF. Pt denies falls. Per chart, pt may have dementia/family suspects dementia  -       Row Name 24 0915          General Information    Patient Profile Reviewed yes  -     Onset of Illness/Injury or Date of Surgery 24  admission date  -     Patient Observations alert  -     Patient/Family/Caregiver Comments/Observations Pt asked if PT could come back later, she felt tired/nauseous; encouraged pt to participate with evaluation with ability to rest afterwards; encouraged to participate but would not be  forced to work  -     General Observations of Patient Pt seen supine in hospital bed, somewhat flat affect  -     Existing Precautions/Restrictions fall  -       Row Name 01/23/24 0915          Cognition    Personal Safety Interventions supervised activity  -       Row Name 01/23/24 0915          Range of Motion Comprehensive    Comment, General Range of Motion A/PROM grossly WFL with hip abd on R LE, L LE 50-75%  -       Row Name 01/23/24 0915          Strength Comprehensive (MMT)    Comment, General Manual Muscle Testing (MMT) Assessment Gross MMT: hip flex <3; knee flex R LE 2/3, L LE 3 to 4; knee ext 3, DF/PF/hip add 5  -       Row Name 01/23/24 0915          Bed Mobility    Bed Mobility supine-sit  -     Supine-Sit Cherry (Bed Mobility) moderate assist (50% patient effort);maximum assist (25% patient effort)  -     Comment, (Bed Mobility) declined sitting EOB or scooting upright in bed; doesn't feel well/nauseous  -       Row Name 01/23/24 0915          Plan of Care Review    Outcome Evaluation Pt seen for evaluation this date, somewhat cooperative with therapy, did not feel well. Pt may benefit from therapeutic intervention to work on pt safety and independence/mobilization with functional mobility.  -       Row Name 01/23/24 0915          Positioning and Restraints    Pre-Treatment Position in bed  -     Post Treatment Position bed  -     In Bed supine;call light within reach;exit alarm on;encouraged to call for assist  -       Row Name 01/23/24 0915          Therapy Assessment/Plan (PT)    Functional Level at Time of Evaluation (PT) Grossly mod/maxA with sup to sit upright in bed, given assist from therapist  -     PT Diagnosis (PT) Impaired/decreased functional mobility  -     Rehab Potential (PT) --  fair/guarded  -     Criteria for Skilled Interventions Met (PT) yes  -     Therapy Frequency (PT) 2 times/wk  2-5x/week  -     Predicted Duration of Therapy  Intervention (PT) length of stay, until D/C  -       Row Name 01/23/24 0915          Physical Therapy Goals    Bed Mobility Goal Selection (PT) bed mobility, PT goal 1  -     Transfer Goal Selection (PT) transfer, PT goal 1  -     Gait Training Goal Selection (PT) gait training, PT goal 1  -       Row Name 01/23/24 0915          Bed Mobility Goal 1 (PT)    Activity/Assistive Device (Bed Mobility Goal 1, PT) sit to supine/supine to sit  -     [place] Level/Cues Needed (Bed Mobility Goal 1, PT) modified independence;independent  -     Time Frame (Bed Mobility Goal 1, PT) long term goal (LTG)  -       Row Name 01/23/24 0915          Transfer Goal 1 (PT)    Activity/Assistive Device (Transfer Goal 1, PT) sit-to-stand/stand-to-sit;bed-to-chair/chair-to-bed;toilet  -     [place] Level/Cues Needed (Transfer Goal 1, PT) independent;modified independence  -     Time Frame (Transfer Goal 1, PT) long term goal (LTG)  -       Row Name 01/23/24 0915          Gait Training Goal 1 (PT)    Activity/Assistive Device (Gait Training Goal 1, PT) gait (walking locomotion);assistive device use;walker, rolling  -     [place] Level (Gait Training Goal 1, PT) contact guard required  -     Distance (Gait Training Goal 1, PT) 25'  -     Time Frame (Gait Training Goal 1, PT) long term goal (LTG)  -               User Key  (r) = Recorded By, (t) = Taken By, (c) = Cosigned By      Initials Name Provider Type    Carline Joyner, PT Physical Therapist                      PT Recommendation and Plan  Anticipated Discharge Disposition (PT): skilled nursing facility, extended care facility, home with 24/7 care, home with supervision, home with assist, home with home health  Planned Therapy Interventions (PT): bed mobility training, gait training, strengthening, transfer training (add interventions PRN, as appropriate, per pt needs)  Therapy Frequency (PT): 2 times/wk (2-5x/week)  Outcome Evaluation: Pt  seen for evaluation this date, somewhat cooperative with therapy, did not feel well. Pt may benefit from therapeutic intervention to work on pt safety and independence/mobilization with functional mobility.       Time Calculation:    PT Charges       Row Name 01/23/24 1444             Time Calculation    Start Time 0915  -      PT Received On 01/23/24  -      PT Goal Re-Cert Due Date 02/06/24  -                User Key  (r) = Recorded By, (t) = Taken By, (c) = Cosigned By      Initials Name Provider Type    Carline Joyner, PT Physical Therapist                  Therapy Charges for Today       Code Description Service Date Service Provider Modifiers Qty    74922630261 HC PT EVAL MOD COMPLEXITY 4 1/23/2024 Carline Estrada, PT GP 1            PT G-Codes  AM-PAC 6 Clicks Score (PT): 12    Carline Estrada, PT  1/23/2024

## 2024-01-23 NOTE — THERAPY EVALUATION
"Acute Care - Speech Language Pathology   Swallow Initial Evaluation [facility]  CLINICAL DYSPHAGIA EVALUATION     Patient Name: Leonie Laws  : 1940  MRN: [id number]  Today's Date: 2024     Admit Date: 2024  Leonie Laws  was seen at bedside this am on [id number] to assess safety/efficacy of swallowing fnx, determine safest/least restrictive diet tolerance.     Ms Laws has a medical hx significant for anxiety, hyperlipidemia, trigeminal neuralgia, and hypothyroidism. She is unfamiliar to SLP department of [facility] prior to this admission.     Per EMR review, Ms Laws was reported to have been \"getting progressively weaker over the past several weeks\" w/ poor po intake noted. Nausea and vomiting was reported to have begun the day prior to presentation to [facility] and her son had reported concern for aspiration of her vomit as she had been noted by family to have an intermittent cough since that time.     Upon arrival to [facility] ED, \"vitals were temperature 98.4, , RR 14, /75, SpO2 98% on room air.  Labs significant for corrected sodium 130, glucose 233.  Hemoglobin A1c 5.9.  CRP 25.25, lactate 2.2, WBC 18.79 with left shift.  Chest x-ray shows left basilar atelectasis versus pneumonia.  CT abdomen/pelvis shows no acute identified findings.  Moderate constipation.  Bibasilar airspace disease most consistent with atelectasis.  CT head with no acute findings\".     Social History     Socioeconomic History    Marital status:    Tobacco Use    Smoking status: Never    Smokeless tobacco: Never   Vaping Use    Vaping Use: Never used   Substance and Sexual Activity    Alcohol use: No    Drug use: No    Sexual activity: Defer   Imaging:     Radiology Results (last 21 days)    Procedure Component Value Units Date/Time    CT Abdomen Pelvis With Contrast [[id number]] Melvin as Reviewed   Order Status: Completed Collected: 24    Updated: 24   Narrative:     EXAM:    CT Abdomen and Pelvis " With Intravenous Contrast     EXAM DATE:    1/22/2024 5:05 PM     CLINICAL HISTORY:    abd pain/sepsis     TECHNIQUE:    Axial computed tomography images of the abdomen and pelvis with  intravenous contrast.  Sagittal and coronal reformatted images were  created and reviewed.  This CT exam was performed using one or more of  the following dose reduction techniques:  automated exposure control,  adjustment of the mA and/or kV according to patient size, and/or use of  iterative reconstruction technique.     COMPARISON:    No relevant prior studies available.     FINDINGS:    Lung bases:  Dependent bibasilar atelectasis. Lung bases otherwise  clear.    Heart:  Mild coronary artery calcifications.      ABDOMEN:    Liver:  Unremarkable as visualized.  No mass.    Gallbladder and bile ducts:  Cholecystectomy.  No ductal dilation.    Pancreas:  Mild fatty infiltration of the pancreas.  No ductal  dilation.    Spleen:  Unremarkable as visualized.  No splenomegaly.    Adrenals:  Unremarkable as visualized.  No mass.    Kidneys and ureters:  There are bilateral renal parapelvic cysts but  no evidence of hydronephrosis.  No renal or ureteral stones.  No  obstructive uropathy.    Stomach and bowel:  Stomach is incompletely distended.  Diffuse  colonic diverticulosis without evidence of acute diverticulitis.      PELVIS:    Appendix:  The appendix is not clearly identified.    Bladder:  Distended urinary bladder without wall thickening.    Reproductive:  Hysterectomy noted.      ABDOMEN and PELVIS:    Intraperitoneal space:  Unremarkable as visualized.  No significant  fluid collection.  No pneumoperitoneum.    Bones/joints:  Degenerative changes of the lumbar spine with mild  multilevel stenosis but no acute bony findings identified.  Arthritic  changes of the bilateral hip joints.  No dislocation.    Soft tissues:  Unremarkable as visualized.    Vasculature:  Advanced calcified atherosclerosis without evidence of  aneurysm.     Lymph nodes:  Unremarkable as visualized.  No enlarged lymph nodes.   Impression:     1.  No acute findings identified within abdomen or pelvis.  2.  Urinary bladder distention without wall thickening.  3.  Prior cholecystectomy and hysterectomy.  4.  Bilateral renal parapelvic cysts. No obstructive uropathy.  5.  Colonic diverticulosis without diverticulitis.  6.  Moderate constipation. No bowel obstruction.  7.  Bibasilar airspace disease most consistent with atelectasis.  8.  Other incidental/nonacute findings above.     This report was finalized on 1/22/2024 5:24 PM by Dr. Fabrizio Fair MD.       CT Head Without Contrast [[id number]] Melvin as Reviewed   Order Status: Completed Collected: 01/22/24 1718    Updated: 01/22/24 1722   Narrative:     EXAM:    CT Head Without Intravenous Contrast     EXAM DATE:    1/22/2024 5:05 PM     CLINICAL HISTORY:    headache     TECHNIQUE:    Axial computed tomography images of the head/brain without intravenous  contrast.  Sagittal and coronal reformatted images were created and  reviewed.  This CT exam was performed using one or more of the following  dose reduction techniques:  automated exposure control, adjustment of  the mA and/or kV according to patient size, and/or use of iterative  reconstruction technique.     COMPARISON:    No relevant prior studies available.     FINDINGS:    Brain and extra-axial spaces:  Unremarkable as visualized.  No  hemorrhage.  No significant white matter disease.  No edema.  No  ventriculomegaly. Mild age-related atrophy noted.    Bones/joints:  Unremarkable as visualized.  No acute fracture.    Soft tissues:  Unremarkable as visualized.    Sinuses:  Unremarkable as visualized.  No acute sinusitis.    Mastoid air cells:  Unremarkable as visualized.  No mastoid effusion.   Impression:       Unremarkable exam demonstrating no CT evidence of acute intracranial  findings.     This report was finalized on 1/22/2024 5:20 PM by Dr. Fabrizio Fair  MD.       XR Chest 1 View [[id number]] Melvin as Reviewed   Order Status: Completed Collected: 01/22/24 1615    Updated: 01/22/24 1617   Narrative:     PROCEDURE: XR CHEST 1 VW-       HISTORY: Cough, vomiting     COMPARISON: None.     FINDINGS: The heart is normal in size. The mediastinum is unremarkable.  There are low lung volumes with a left basilar opacity either  atelectasis or pneumonia. There are no effusions. There is no  pneumothorax. There are no acute osseous abnormalities.      Impression:     Low lung volumes with left basilar atelectasis versus  pneumonia.     This report was finalized on 1/22/2024 4:15 PM by David Ch M.D..     Labs:   Latest Reference Range & Units 01/22/24 15:34 01/23/24 05:09   WBC 3.40 - 10.80 10*3/mm3 18.78 (H) 16.16 (H)   RBC 3.77 - 5.28 10*6/mm3 4.26 3.18 (L)   Hemoglobin 12.0 - 15.9 g/dL 12.6 9.3 (L)   Hematocrit 34.0 - 46.6 % 38.4 27.9 (L)   Platelets 140 - 450 10*3/mm3 399 340   RDW 12.3 - 15.4 % 14.4 14.2   MCV 79.0 - 97.0 fL 90.1 87.7   MCH 26.6 - 33.0 pg 29.6 29.2   MCHC 31.5 - 35.7 g/dL 32.8 33.3   MPV 6.0 - 12.0 fL 9.7 10.4   RDW-SD 37.0 - 54.0 fl 47.3 45.8   (H): Data is abnormally high  (L): Data is abnormally low  Diet Orders (active) (From admission, onward)       Start     Ordered    01/23/24 1136  DIET MESSAGE Diet has been NPO. Please send a lunch tray.  Once        Comments: Diet has been NPO. Please send a lunch tray.    01/23/24 1135 01/23/24 1135  Diet: Regular/House Diet; Feeding Assistance - Nursing; Texture: Soft to Chew (NDD 3); Soft to Chew: Chopped Meat; Fluid Consistency: Thin (IDDSI 0)  Diet Effective Now         01/23/24 8674                  Upon SLP presentation to pt room, MD is at bedside as well as family members. She answers MD questions appropriately though is limitedly conversant. Following MD departure from room, Ms Laws is able to introduce remaining family member as well as her relationship to pt. She is oriented to self and  current situation at this time. She is able to discuss prior occupation and family relationships w/ appropriate answers. Generally flat affect is noted.     She is observed on room air w/o complications across this evaluation.    Ms Laws was repositioned to fully upright and centered in bed w/ assist from MD to accept multiple po presentations of ice chips, solid cracker, puree, and thin liquids via spoon and straw. Ms Laws initially reported that she was able to self provide po trials, however when given opportunity to do so she requested assistance as she stated she was concerned for spilling or dropping cup or utensil.     Facial/oral structures were generally symmetrical upon observation. Lingual protrusion revealed no deviation. Oral mucosa were slightly xerostomic, pink, and clean. Secretions were clear, thin, and well controlled. OROM/NATASHA was generally weak overall to imitate oral postures. Gag is not assessed. Volitional cough was intact w/ moderately weak intensity, clear in quality, non-productive. Voice was adequate in intensity, clear in quality w/ intelligible speech.    Upon po presentations, adequate bolus anticipation and acceptance for spoon and straw presentation. She initially reports she will accept cup presentation style, however as she begins to bring cup toward labial edge w/ SLP assist for cup stabilization she declines and requests a straw. Labial seal is mildly weak for bolus clearance via spoon bowl w/ some trace oral loss of thin liquids and incomplete clearance of puree consistency from spoon bowl noted. Labial seal is additionally noted to be mildly weak for suction via straw w/ repeated attempts made prior to obtaining bolus via straw. She denies increased effort or weakness. Bolus formation, manipulation, and control appeared to be mildly weak  and prolonged overall w/ prolonged mastication of solid consistency only and mixed phasic-rotary mastication pattern demonstrated. A-p  transit was timely w/o significant oral residue appreciated. No overt s/s aspiration were evidenced before the swallow.     Pharyngeal swallow was timely w/ adequate hyolaryngeal elevation per palpation. No overt s/s aspiration evidenced across this evaluation. No silent aspiration suspected. She denied odynophagia.      Visit Dx:     ICD-10-CM ICD-9-CM   1. Nausea and vomiting, unspecified vomiting type  R11.2 787.01   2. Dehydration  E86.0 276.51   3. Debility  R53.81 799.3     Patient Active Problem List   Diagnosis    Cystocele    Diverticulosis of large intestine    Hemorrhoids    Hyperlipidemia    Postoperative hypothyroidism    Impaired fasting glucose    Osteoporosis    Rectocele    Trigeminal neuralgia    Anxiety    Vomiting     Past Medical History:   Diagnosis Date    Anxiety     Hyperlipidemia     Trigeminal neuralgia      Past Surgical History:   Procedure Laterality Date    BREAST SURGERY      HYSTERECTOMY         Impression:     Ms Laws presented w/ concerns for a mild oral dysphagia w/ weak labial seal evidenced w/ some oral loss, mildly prolonged bolus formation and manipulation, and increased mastication of solid consistency only. Pharyngeal swallow was felt to be wfl.     Per this evaluation's findings w/ observed weakness during bolus prep, Ms Laws is felt to most benefit from diet initiation of soft to chew textures, chopped meats, and thin liquids. She is further felt to benefit from assistance w/ feeding per observed limitations related to self-provision across this evaluation despite Ms Laws reporting she is able to perform this task. Medications are recommended to be administered whole in puree/thins or crushed/broken PRN. Recommend diet to be advanced as Ms Laws tolerates w/ improvement in acute status.     SLP Recommendation and Plan     1. Mechanical soft textures, chopped meats, thin liquids   2. Medications whole in puree/thins. Crushed/broken PRN.  3. Upright and centered for all  po intake  4. FLAVIA precautions.  5. Oral care protocol.  6. 1:1 assistance w/ po intake.   7. Universal aspiration precautions.   8. Advance po diet as tolerated.     No further formal SLP f/u warranted/recommended at this time. Diet to be advanced as pt tolerates.     D/w patient results and recommendations w/ verbal agreement.    D/w MD results and recommendations w/ verbal agreement.    Thank you for allowing me to participate in the care of your patient-  Jennifer Devlin M.S., CCC-SLP        EDUCATION  The patient has been educated in the following areas:   Dysphagia (Swallowing Impairment) Oral Care/Hydration.     Time Calculation:       Therapy Charges for Today       Code Description Service Date Service Provider Modifiers Qty    [id number] HC ST EVAL ORAL PHARYNG SWALLOW 4 1/23/2024 Jennifer Devlin, MS CCC-SLP GN 1                 Jennifer Devlin MS CCC-SLP  1/23/2024

## 2024-01-23 NOTE — ED PROVIDER NOTES
Subjective   History of Present Illness  Patient is an 83-year-old female with significant past medical history positive for anxiety, hyperlipidemia, try and abdominal neuralgia presenting to the ER complaints of nausea and vomiting.  Patient's son at bedside and states that she has not been diagnosed with dementia but is intermittently pleasantly confused.  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  Patient is alert and oriented to person, self, and date. Patient denies CP, SOA, cough, fever, or any additional symptoms at this time. Patients son concerned with ability of her  to care for her and himself due to debility and age.     History provided by:  Patient and relative  History limited by:  Age and dementia   used: No        Review of Systems   Constitutional:  Positive for activity change and appetite change. Negative for fever.   HENT: Negative.     Respiratory: Negative.     Cardiovascular: Negative.  Negative for chest pain.   Gastrointestinal:  Positive for abdominal pain, nausea and vomiting.   Endocrine: Negative.    Genitourinary: Negative.  Negative for dysuria.   Skin: Negative.    Neurological: Negative.    Psychiatric/Behavioral: Negative.     All other systems reviewed and are negative.      Past Medical History:   Diagnosis Date    Anxiety     Hyperlipidemia     Trigeminal neuralgia        Allergies   Allergen Reactions    Doxycycline Hives    Gentamycin [Gentamicin] Other (See Comments)     Eye swelling    Miconazole      Burning    Penicillins     Sulfa Antibiotics        Past Surgical History:   Procedure Laterality Date    BREAST SURGERY      HYSTERECTOMY         Family History   Problem Relation Age of Onset    Stroke Mother     Heart disease Father        Social History     Socioeconomic History    Marital status:    Tobacco Use    Smoking status: Never    Smokeless tobacco: Never   Substance and Sexual  Activity    Alcohol use: No    Drug use: No           Objective   Physical Exam  Vitals and nursing note reviewed.   Constitutional:       General: She is not in acute distress.     Appearance: She is well-developed. She is not diaphoretic.   HENT:      Head: Normocephalic and atraumatic.      Right Ear: External ear normal.      Left Ear: External ear normal.      Nose: Nose normal.      Mouth/Throat:      Mouth: Mucous membranes are dry.   Eyes:      Conjunctiva/sclera: Conjunctivae normal.      Pupils: Pupils are equal, round, and reactive to light.   Neck:      Vascular: No JVD.      Trachea: No tracheal deviation.   Cardiovascular:      Rate and Rhythm: Normal rate and regular rhythm.      Heart sounds: Normal heart sounds. No murmur heard.  Pulmonary:      Effort: Pulmonary effort is normal. No respiratory distress.      Breath sounds: Normal breath sounds. No wheezing.   Abdominal:      General: Bowel sounds are normal.      Palpations: Abdomen is soft.      Tenderness: There is abdominal tenderness.   Musculoskeletal:         General: No deformity. Normal range of motion.      Cervical back: Normal range of motion and neck supple.   Skin:     General: Skin is warm and dry.      Coloration: Skin is not pale.      Findings: No erythema or rash.   Neurological:      Mental Status: She is alert. Mental status is at baseline.      Cranial Nerves: No cranial nerve deficit.   Psychiatric:         Behavior: Behavior normal.         Thought Content: Thought content normal.         Procedures       Results for orders placed or performed during the hospital encounter of 01/22/24   Comprehensive Metabolic Panel    Specimen: Arm, Left; Blood   Result Value Ref Range    Glucose 233 (H) 65 - 99 mg/dL    BUN 16 8 - 23 mg/dL    Creatinine 0.95 0.57 - 1.00 mg/dL    Sodium 127 (L) 136 - 145 mmol/L    Potassium 4.6 3.5 - 5.2 mmol/L    Chloride 89 (L) 98 - 107 mmol/L    CO2 23.9 22.0 - 29.0 mmol/L    Calcium 9.7 8.6 - 10.5 mg/dL     Total Protein 8.2 6.0 - 8.5 g/dL    Albumin 2.7 (L) 3.5 - 5.2 g/dL    ALT (SGPT) 32 1 - 33 U/L    AST (SGOT) 38 (H) 1 - 32 U/L    Alkaline Phosphatase 118 (H) 39 - 117 U/L    Total Bilirubin 0.6 0.0 - 1.2 mg/dL    Globulin 5.5 gm/dL    A/G Ratio 0.5 g/dL    BUN/Creatinine Ratio 16.8 7.0 - 25.0    Anion Gap 14.1 5.0 - 15.0 mmol/L    eGFR 59.9 (L) >60.0 mL/min/1.73   Lipase    Specimen: Arm, Left; Blood   Result Value Ref Range    Lipase 8 (L) 13 - 60 U/L   Single High Sensitivity Troponin T    Specimen: Arm, Left; Blood   Result Value Ref Range    HS Troponin T <6 <14 ng/L   C-reactive Protein    Specimen: Arm, Left; Blood   Result Value Ref Range    C-Reactive Protein 25.25 (H) 0.00 - 0.50 mg/dL   Sedimentation Rate    Specimen: Arm, Left; Blood   Result Value Ref Range    Sed Rate 108 (H) 0 - 30 mm/hr   Lactic Acid, Plasma    Specimen: Arm, Left; Blood   Result Value Ref Range    Lactate 2.2 (C) 0.5 - 2.0 mmol/L   Magnesium    Specimen: Arm, Left; Blood   Result Value Ref Range    Magnesium 2.0 1.6 - 2.4 mg/dL   Phosphorus    Specimen: Arm, Left; Blood   Result Value Ref Range    Phosphorus 3.7 2.5 - 4.5 mg/dL   CBC Auto Differential    Specimen: Arm, Left; Blood   Result Value Ref Range    WBC 18.78 (H) 3.40 - 10.80 10*3/mm3    RBC 4.26 3.77 - 5.28 10*6/mm3    Hemoglobin 12.6 12.0 - 15.9 g/dL    Hematocrit 38.4 34.0 - 46.6 %    MCV 90.1 79.0 - 97.0 fL    MCH 29.6 26.6 - 33.0 pg    MCHC 32.8 31.5 - 35.7 g/dL    RDW 14.4 12.3 - 15.4 %    RDW-SD 47.3 37.0 - 54.0 fl    MPV 9.7 6.0 - 12.0 fL    Platelets 399 140 - 450 10*3/mm3    Neutrophil % 91.7 (H) 42.7 - 76.0 %    Lymphocyte % 2.1 (L) 19.6 - 45.3 %    Monocyte % 4.8 (L) 5.0 - 12.0 %    Eosinophil % 0.0 (L) 0.3 - 6.2 %    Basophil % 0.4 0.0 - 1.5 %    Immature Grans % 1.0 (H) 0.0 - 0.5 %    Neutrophils, Absolute 17.21 (H) 1.70 - 7.00 10*3/mm3    Lymphocytes, Absolute 0.40 (L) 0.70 - 3.10 10*3/mm3    Monocytes, Absolute 0.91 (H) 0.10 - 0.90 10*3/mm3     Eosinophils, Absolute 0.00 0.00 - 0.40 10*3/mm3    Basophils, Absolute 0.08 0.00 - 0.20 10*3/mm3    Immature Grans, Absolute 0.18 (H) 0.00 - 0.05 10*3/mm3    nRBC 0.0 0.0 - 0.2 /100 WBC   STAT Lactic Acid, Reflex    Specimen: Arm, Right; Blood   Result Value Ref Range    Lactate 1.8 0.5 - 2.0 mmol/L   Urinalysis With Microscopic If Indicated (No Culture) - Straight Cath    Specimen: Straight Cath; Urine   Result Value Ref Range    Color, UA Yellow Yellow, Straw    Appearance, UA Cloudy (A) Clear    pH, UA 6.5 5.0 - 8.0    Specific Gravity, UA >1.030 (H) 1.005 - 1.030    Glucose, UA Negative Negative    Ketones, UA Trace (A) Negative    Bilirubin, UA Negative Negative    Blood, UA Small (1+) (A) Negative    Protein,  mg/dL (2+) (A) Negative    Leuk Esterase, UA Negative Negative    Nitrite, UA Negative Negative    Urobilinogen, UA 1.0 E.U./dL 0.2 - 1.0 E.U./dL   Hemoglobin A1c    Specimen: Arm, Left; Blood   Result Value Ref Range    Hemoglobin A1C 5.90 (H) 4.80 - 5.60 %   Urinalysis, Microscopic Only - Straight Cath    Specimen: Straight Cath; Urine   Result Value Ref Range    RBC, UA 0-2 None Seen, 0-2 /HPF    WBC, UA 0-2 None Seen, 0-2 /HPF    Bacteria, UA Trace (A) None Seen /HPF    Squamous Epithelial Cells, UA 0-2 None Seen, 0-2 /HPF    Hyaline Casts, UA 0-2 None Seen /LPF    Methodology Manual Light Microscopy    POC Glucose Once    Specimen: Blood   Result Value Ref Range    Glucose 224 (H) 70 - 130 mg/dL   ECG 12 Lead Electrolyte Imbalance   Result Value Ref Range    QT Interval 348 ms    QTC Interval 446 ms   Green Top (Gel)   Result Value Ref Range    Extra Tube Hold for add-ons.    Lavender Top   Result Value Ref Range    Extra Tube hold for add-on    Gold Top - SST   Result Value Ref Range    Extra Tube Hold for add-ons.    Light Blue Top   Result Value Ref Range    Extra Tube Hold for add-ons.       CT Abdomen Pelvis With Contrast   Final Result   1.  No acute findings identified within abdomen or  pelvis.   2.  Urinary bladder distention without wall thickening.   3.  Prior cholecystectomy and hysterectomy.   4.  Bilateral renal parapelvic cysts. No obstructive uropathy.   5.  Colonic diverticulosis without diverticulitis.   6.  Moderate constipation. No bowel obstruction.   7.  Bibasilar airspace disease most consistent with atelectasis.   8.  Other incidental/nonacute findings above.           This report was finalized on 1/22/2024 5:24 PM by Dr. Fabrizio Fair MD.          CT Head Without Contrast   Final Result     Unremarkable exam demonstrating no CT evidence of acute intracranial   findings.           This report was finalized on 1/22/2024 5:20 PM by Dr. Fabrizio Fair MD.          XR Chest 1 View   Final Result   Low lung volumes with left basilar atelectasis versus   pneumonia.           This report was finalized on 1/22/2024 4:15 PM by David Ch M.D..               ED Course  ED Course as of 01/22/24 1955 Mon Jan 22, 2024   1632 C-Reactive Protein(!): 25.25 [SM]   1632 Sed Rate(!): 108 [SM]   1632 WBC(!): 18.78 [SM]   1635 ECG 12 Lead Electrolyte Imbalance  Sinus rhythm premature supraventricular complexes, rate of 99 QRS 94 QTc 446, no ST segment elevation, depression, significant T wave inversions or signs of acute ischemia, nonspecific T wave changes, unremarkable EKG interpreted by myself. [SM-2]   1647 D/W Nursing staff regarding sepsis bolus to be administered total of 1743mL  []   1739 XR Chest 1 View []   1739 CT Head Without Contrast []   1739 CT Abdomen Pelvis With Contrast []   1937 D/W Dr. Beach agrees upon admission  []      ED Course User Index  [] Gabriela Jacobs, APRN  [SM-2] Rafal Amaro MD                                             Medical Decision Making  Patient is an 83-year-old female with significant past medical history positive for anxiety, hyperlipidemia, try and abdominal neuralgia presenting to the ER complaints of nausea and  vomiting.  Patient's son at bedside and states that she has not been diagnosed with dementia but is intermittently pleasantly confused.  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  Patient is alert and oriented to person, self, and date. Patient denies CP, SOA, cough, fever, or any additional symptoms at this time. Patients son concerned with ability of her  to care for her and himself due to debility and age.     Patient to be admitted for further evaluation and treatment    Problems Addressed:  Debility: complicated acute illness or injury  Dehydration: complicated acute illness or injury  Nausea and vomiting, unspecified vomiting type: complicated acute illness or injury    Amount and/or Complexity of Data Reviewed  Labs: ordered. Decision-making details documented in ED Course.  Radiology: ordered. Decision-making details documented in ED Course.  ECG/medicine tests: ordered.    Risk  Prescription drug management.  Decision regarding hospitalization.        Final diagnoses:   Nausea and vomiting, unspecified vomiting type   Dehydration   Debility       ED Disposition  ED Disposition       ED Disposition   Decision to Admit    Condition   --    Comment   Level of Care: Medical Telemetry [23]   Diagnosis: Vomiting [520580]   Admitting Physician: SHANELL PAVON [[id number]]   Certification: I Certify That Inpatient Hospital Services Are Medically Necessary For Greater Than 2 Midnights                 No follow-up provider specified.       Medication List      No changes were made to your prescriptions during this visit.            Gabriela Jacobs, APRN  01/22/24 1955     Blood   Result Value Ref Range    TSH 0.897 0.270 - 4.200 uIU/mL   Vitamin B12    Specimen: Blood   Result Value Ref Range    Vitamin B-12 889 211 - 946 pg/mL   Folate    Specimen: Blood   Result Value Ref Range    Folate 2.57 (L) 4.78 - 24.20 ng/mL   Magnesium    Specimen: Arm, Left; Blood   Result Value Ref Range    Magnesium 2.0 1.6 - 2.4 mg/dL   CBC Auto Differential    Specimen: Blood   Result Value Ref Range    WBC 16.16 (H) 3.40 - 10.80 10*3/mm3    RBC 3.18 (L) 3.77 - 5.28 10*6/mm3    Hemoglobin 9.3 (L) 12.0 - 15.9 g/dL    Hematocrit 27.9 (L) 34.0 - 46.6 %    MCV 87.7 79.0 - 97.0 fL    MCH 29.2 26.6 - 33.0 pg    MCHC 33.3 31.5 - 35.7 g/dL    RDW 14.2 12.3 - 15.4 %    RDW-SD 45.8 37.0 - 54.0 fl    MPV 10.4 6.0 - 12.0 fL    Platelets 340 140 - 450 10*3/mm3    Neutrophil % 88.3 (H) 42.7 - 76.0 %    Lymphocyte % 4.6 (L) 19.6 - 45.3 %    Monocyte % 6.2 5.0 - 12.0 %    Eosinophil % 0.0 (L) 0.3 - 6.2 %    Basophil % 0.2 0.0 - 1.5 %    Immature Grans % 0.7 (H) 0.0 - 0.5 %    Neutrophils, Absolute 14.25 (H) 1.70 - 7.00 10*3/mm3    Lymphocytes, Absolute 0.75 0.70 - 3.10 10*3/mm3    Monocytes, Absolute 1.00 (H) 0.10 - 0.90 10*3/mm3    Eosinophils, Absolute 0.00 0.00 - 0.40 10*3/mm3    Basophils, Absolute 0.04 0.00 - 0.20 10*3/mm3    Immature Grans, Absolute 0.12 (H) 0.00 - 0.05 10*3/mm3    nRBC 0.0 0.0 - 0.2 /100 WBC   CBC (No Diff)    Specimen: Blood   Result Value Ref Range    WBC 13.24 (H) 3.40 - 10.80 10*3/mm3    RBC 3.16 (L) 3.77 - 5.28 10*6/mm3    Hemoglobin 9.3 (L) 12.0 - 15.9 g/dL    Hematocrit 27.9 (L) 34.0 - 46.6 %    MCV 88.3 79.0 - 97.0 fL    MCH 29.4 26.6 - 33.0 pg    MCHC 33.3 31.5 - 35.7 g/dL    RDW 14.6 12.3 - 15.4 %    RDW-SD 47.4 37.0 - 54.0 fl    MPV 10.2 6.0 - 12.0 fL    Platelets 298 140 - 450 10*3/mm3   Basic Metabolic Panel    Specimen: Blood   Result Value Ref Range    Glucose 91 65 - 99 mg/dL    BUN 15 8 - 23 mg/dL    Creatinine 0.86 0.57 - 1.00 mg/dL    Sodium 133 (L) 136 - 145 mmol/L     Potassium 3.4 (L) 3.5 - 5.2 mmol/L    Chloride 103 98 - 107 mmol/L    CO2 25.5 22.0 - 29.0 mmol/L    Calcium 8.2 (L) 8.6 - 10.5 mg/dL    BUN/Creatinine Ratio 17.4 7.0 - 25.0    Anion Gap 4.5 (L) 5.0 - 15.0 mmol/L    eGFR 67.5 >60.0 mL/min/1.73   Vancomycin, Random    Specimen: Blood   Result Value Ref Range    Vancomycin Random 8.80 5.00 - 40.00 mcg/mL   CBC (No Diff)    Specimen: Blood   Result Value Ref Range    WBC 12.17 (H) 3.40 - 10.80 10*3/mm3    RBC 3.23 (L) 3.77 - 5.28 10*6/mm3    Hemoglobin 9.4 (L) 12.0 - 15.9 g/dL    Hematocrit 28.6 (L) 34.0 - 46.6 %    MCV 88.5 79.0 - 97.0 fL    MCH 29.1 26.6 - 33.0 pg    MCHC 32.9 31.5 - 35.7 g/dL    RDW 15.0 12.3 - 15.4 %    RDW-SD 48.3 37.0 - 54.0 fl    MPV 10.9 6.0 - 12.0 fL    Platelets 306 140 - 450 10*3/mm3   Basic Metabolic Panel    Specimen: Blood   Result Value Ref Range    Glucose 146 (H) 65 - 99 mg/dL    BUN 9 8 - 23 mg/dL    Creatinine 0.70 0.57 - 1.00 mg/dL    Sodium 133 (L) 136 - 145 mmol/L    Potassium 3.1 (L) 3.5 - 5.2 mmol/L    Chloride 101 98 - 107 mmol/L    CO2 22.8 22.0 - 29.0 mmol/L    Calcium 7.9 (L) 8.6 - 10.5 mg/dL    BUN/Creatinine Ratio 12.9 7.0 - 25.0    Anion Gap 9.2 5.0 - 15.0 mmol/L    eGFR 86.5 >60.0 mL/min/1.73   CBC (No Diff)    Specimen: Blood   Result Value Ref Range    WBC 9.80 3.40 - 10.80 10*3/mm3    RBC 3.23 (L) 3.77 - 5.28 10*6/mm3    Hemoglobin 9.5 (L) 12.0 - 15.9 g/dL    Hematocrit 31.3 (L) 34.0 - 46.6 %    MCV 96.9 79.0 - 97.0 fL    MCH 29.4 26.6 - 33.0 pg    MCHC 30.4 (L) 31.5 - 35.7 g/dL    RDW 15.3 12.3 - 15.4 %    RDW-SD 54.3 (H) 37.0 - 54.0 fl    MPV 10.5 6.0 - 12.0 fL    Platelets 288 140 - 450 10*3/mm3   Basic Metabolic Panel    Specimen: Blood   Result Value Ref Range    Glucose 121 (H) 65 - 99 mg/dL    BUN 5 (L) 8 - 23 mg/dL    Creatinine 0.58 0.57 - 1.00 mg/dL    Sodium 137 136 - 145 mmol/L    Potassium 3.4 (L) 3.5 - 5.2 mmol/L    Chloride 107 98 - 107 mmol/L    CO2 22.2 22.0 - 29.0 mmol/L    Calcium 8.1 (L) 8.6 - 10.5  mg/dL    BUN/Creatinine Ratio 8.6 7.0 - 25.0    Anion Gap 7.8 5.0 - 15.0 mmol/L    eGFR 90.5 >60.0 mL/min/1.73   CBC (No Diff)    Specimen: Blood   Result Value Ref Range    WBC 12.30 (H) 3.40 - 10.80 10*3/mm3    RBC 3.05 (L) 3.77 - 5.28 10*6/mm3    Hemoglobin 8.9 (L) 12.0 - 15.9 g/dL    Hematocrit 26.9 (L) 34.0 - 46.6 %    MCV 88.2 79.0 - 97.0 fL    MCH 29.2 26.6 - 33.0 pg    MCHC 33.1 31.5 - 35.7 g/dL    RDW 14.9 12.3 - 15.4 %    RDW-SD 48.4 37.0 - 54.0 fl    MPV 10.3 6.0 - 12.0 fL    Platelets 381 140 - 450 10*3/mm3   Basic Metabolic Panel    Specimen: Blood   Result Value Ref Range    Glucose 122 (H) 65 - 99 mg/dL    BUN 6 (L) 8 - 23 mg/dL    Creatinine 0.62 0.57 - 1.00 mg/dL    Sodium 135 (L) 136 - 145 mmol/L    Potassium 3.0 (L) 3.5 - 5.2 mmol/L    Chloride 104 98 - 107 mmol/L    CO2 24.3 22.0 - 29.0 mmol/L    Calcium 8.1 (L) 8.6 - 10.5 mg/dL    BUN/Creatinine Ratio 9.7 7.0 - 25.0    Anion Gap 6.7 5.0 - 15.0 mmol/L    eGFR 89.0 >60.0 mL/min/1.73   Magnesium    Specimen: Blood   Result Value Ref Range    Magnesium 1.9 1.6 - 2.4 mg/dL   CBC (No Diff)    Specimen: Blood   Result Value Ref Range    WBC 10.51 3.40 - 10.80 10*3/mm3    RBC 2.93 (L) 3.77 - 5.28 10*6/mm3    Hemoglobin 8.7 (L) 12.0 - 15.9 g/dL    Hematocrit 26.0 (L) 34.0 - 46.6 %    MCV 88.7 79.0 - 97.0 fL    MCH 29.7 26.6 - 33.0 pg    MCHC 33.5 31.5 - 35.7 g/dL    RDW 15.5 (H) 12.3 - 15.4 %    RDW-SD 50.0 37.0 - 54.0 fl    MPV 10.0 6.0 - 12.0 fL    Platelets 418 140 - 450 10*3/mm3   Basic Metabolic Panel    Specimen: Blood   Result Value Ref Range    Glucose 135 (H) 65 - 99 mg/dL    BUN 5 (L) 8 - 23 mg/dL    Creatinine 0.62 0.57 - 1.00 mg/dL    Sodium 138 136 - 145 mmol/L    Potassium 3.4 (L) 3.5 - 5.2 mmol/L    Chloride 107 98 - 107 mmol/L    CO2 23.4 22.0 - 29.0 mmol/L    Calcium 8.1 (L) 8.6 - 10.5 mg/dL    BUN/Creatinine Ratio 8.1 7.0 - 25.0    Anion Gap 7.6 5.0 - 15.0 mmol/L    eGFR 89.0 >60.0 mL/min/1.73   CBC (No Diff)    Specimen: Blood   Result  Value Ref Range    WBC 10.59 3.40 - 10.80 10*3/mm3    RBC 3.25 (L) 3.77 - 5.28 10*6/mm3    Hemoglobin 9.6 (L) 12.0 - 15.9 g/dL    Hematocrit 28.4 (L) 34.0 - 46.6 %    MCV 87.4 79.0 - 97.0 fL    MCH 29.5 26.6 - 33.0 pg    MCHC 33.8 31.5 - 35.7 g/dL    RDW 15.5 (H) 12.3 - 15.4 %    RDW-SD 49.2 37.0 - 54.0 fl    MPV 10.2 6.0 - 12.0 fL    Platelets 504 (H) 140 - 450 10*3/mm3   Basic Metabolic Panel    Specimen: Blood   Result Value Ref Range    Glucose 104 (H) 65 - 99 mg/dL    BUN 5 (L) 8 - 23 mg/dL    Creatinine 0.64 0.57 - 1.00 mg/dL    Sodium 138 136 - 145 mmol/L    Potassium 3.8 3.5 - 5.2 mmol/L    Chloride 105 98 - 107 mmol/L    CO2 24.1 22.0 - 29.0 mmol/L    Calcium 8.6 8.6 - 10.5 mg/dL    BUN/Creatinine Ratio 7.8 7.0 - 25.0    Anion Gap 8.9 5.0 - 15.0 mmol/L    eGFR 88.4 >60.0 mL/min/1.73   POC Glucose Once    Specimen: Blood   Result Value Ref Range    Glucose 224 (H) 70 - 130 mg/dL   POC Glucose Once    Specimen: Blood   Result Value Ref Range    Glucose 118 70 - 130 mg/dL   POC Glucose Once    Specimen: Blood   Result Value Ref Range    Glucose 98 70 - 130 mg/dL   POC Glucose Once    Specimen: Blood   Result Value Ref Range    Glucose 98 70 - 130 mg/dL   POC Glucose Once    Specimen: Blood   Result Value Ref Range    Glucose 114 70 - 130 mg/dL   POC Glucose Once    Specimen: Blood   Result Value Ref Range    Glucose 80 70 - 130 mg/dL   POC Glucose Once    Specimen: Blood   Result Value Ref Range    Glucose 137 (H) 70 - 130 mg/dL   POC Glucose Once    Specimen: Blood   Result Value Ref Range    Glucose 155 (H) 70 - 130 mg/dL   POC Glucose Once    Specimen: Blood   Result Value Ref Range    Glucose 209 (H) 70 - 130 mg/dL   POC Glucose Once    Specimen: Blood   Result Value Ref Range    Glucose 107 70 - 130 mg/dL   POC Glucose Once    Specimen: Blood   Result Value Ref Range    Glucose 146 (H) 70 - 130 mg/dL   POC Glucose Once    Specimen: Blood   Result Value Ref Range    Glucose 153 (H) 70 - 130 mg/dL   POC  Glucose Once    Specimen: Blood   Result Value Ref Range    Glucose 148 (H) 70 - 130 mg/dL   POC Glucose Once    Specimen: Blood   Result Value Ref Range    Glucose 114 70 - 130 mg/dL   POC Glucose Once    Specimen: Blood   Result Value Ref Range    Glucose 124 70 - 130 mg/dL   POC Glucose Once    Specimen: Blood   Result Value Ref Range    Glucose 143 (H) 70 - 130 mg/dL   POC Glucose Once    Specimen: Blood   Result Value Ref Range    Glucose 184 (H) 70 - 130 mg/dL   POC Glucose Once    Specimen: Blood   Result Value Ref Range    Glucose 108 70 - 130 mg/dL   POC Glucose Once    Specimen: Blood   Result Value Ref Range    Glucose 152 (H) 70 - 130 mg/dL   POC Glucose Once    Specimen: Blood   Result Value Ref Range    Glucose 94 70 - 130 mg/dL   POC Glucose Once    Specimen: Blood   Result Value Ref Range    Glucose 172 (H) 70 - 130 mg/dL   POC Glucose Once    Specimen: Blood   Result Value Ref Range    Glucose 110 70 - 130 mg/dL   POC Glucose Once    Specimen: Blood   Result Value Ref Range    Glucose 148 (H) 70 - 130 mg/dL   POC Glucose Once    Specimen: Blood   Result Value Ref Range    Glucose 120 70 - 130 mg/dL   POC Glucose Once    Specimen: Blood   Result Value Ref Range    Glucose 123 70 - 130 mg/dL   POC Glucose Once    Specimen: Blood   Result Value Ref Range    Glucose 84 70 - 130 mg/dL   POC Glucose Once    Specimen: Blood   Result Value Ref Range    Glucose 104 70 - 130 mg/dL   POC Glucose Once    Specimen: Blood   Result Value Ref Range    Glucose 146 (H) 70 - 130 mg/dL   POC Glucose Once    Specimen: Blood   Result Value Ref Range    Glucose 152 (H) 70 - 130 mg/dL   POC Glucose Once    Specimen: Blood   Result Value Ref Range    Glucose 103 70 - 130 mg/dL   POC Glucose Once    Specimen: Blood   Result Value Ref Range    Glucose 136 (H) 70 - 130 mg/dL   POC Glucose Once    Specimen: Blood   Result Value Ref Range    Glucose 152 (H) 70 - 130 mg/dL   POC Glucose Once    Specimen: Blood   Result Value Ref  Range    Glucose 159 (H) 70 - 130 mg/dL   POC Glucose Once    Specimen: Blood   Result Value Ref Range    Glucose 125 70 - 130 mg/dL   POC Glucose Once    Specimen: Blood   Result Value Ref Range    Glucose 118 70 - 130 mg/dL   ECG 12 Lead Electrolyte Imbalance   Result Value Ref Range    QT Interval 348 ms    QTC Interval 446 ms   Green Top (Gel)   Result Value Ref Range    Extra Tube Hold for add-ons.    Lavender Top   Result Value Ref Range    Extra Tube hold for add-on    Gold Top - SST   Result Value Ref Range    Extra Tube Hold for add-ons.    Light Blue Top   Result Value Ref Range    Extra Tube Hold for add-ons.       CT Abdomen Pelvis With Contrast   Final Result   1.  No acute findings identified within abdomen or pelvis.   2.  Urinary bladder distention without wall thickening.   3.  Prior cholecystectomy and hysterectomy.   4.  Bilateral renal parapelvic cysts. No obstructive uropathy.   5.  Colonic diverticulosis without diverticulitis.   6.  Moderate constipation. No bowel obstruction.   7.  Bibasilar airspace disease most consistent with atelectasis.   8.  Other incidental/nonacute findings above.           This report was finalized on 1/22/2024 5:24 PM by Dr. Fabrizio Fair MD.          CT Head Without Contrast   Final Result     Unremarkable exam demonstrating no CT evidence of acute intracranial   findings.           This report was finalized on 1/22/2024 5:20 PM by Dr. Fabrizio Fair MD.          XR Chest 1 View   Final Result   Low lung volumes with left basilar atelectasis versus   pneumonia.           This report was finalized on 1/22/2024 4:15 PM by David Ch M.D..               ED Course  ED Course as of 02/06/24 1439   Mon Jan 22, 2024   1632 C-Reactive Protein(!): 25.25 [SM]   1632 Sed Rate(!): 108 [SM]   1632 WBC(!): 18.78 [SM]   1635 ECG 12 Lead Electrolyte Imbalance  Sinus rhythm premature supraventricular complexes, rate of 99 QRS 94 QTc 446, no ST segment elevation,  depression, significant T wave inversions or signs of acute ischemia, nonspecific T wave changes, unremarkable EKG interpreted by myself. [SM-2]   1647 D/W Nursing staff regarding sepsis bolus to be administered total of 1743mL  [SM]   1739 XR Chest 1 View [SM]   1739 CT Head Without Contrast [SM]   1739 CT Abdomen Pelvis With Contrast [SM]   1937 D/W Dr. Beach agrees upon admission  []   2039 Patients family reports right leg shaking uncontrollable intermittently. Cerebell ordered at this time.  []      ED Course User Index  [SM] Gabriela Jacobs, APRN  [SM-2] Rafal Amaro MD                                             Medical Decision Making  Patient is an 83-year-old female with significant past medical history positive for anxiety, hyperlipidemia, trigeminal neuralgia presenting to the ER complaints of nausea and vomiting.  Patient's son at bedside and states that she has not been diagnosed with dementia but is intermittently pleasantly confused.  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  Patient is alert and oriented to person, self, and date. Patient denies CP, SOA, cough, fever, or any additional symptoms at this time. Patients son concerned with ability of her  to care for her and himself due to debility and age.     Patient to be admitted for further evaluation and treatment    Problems Addressed:  Debility: complicated acute illness or injury  Dehydration: complicated acute illness or injury  Nausea and vomiting, unspecified vomiting type: complicated acute illness or injury    Amount and/or Complexity of Data Reviewed  Labs: ordered. Decision-making details documented in ED Course.  Radiology: ordered. Decision-making details documented in ED Course.  ECG/medicine tests: ordered.    Risk  OTC drugs.  Prescription drug management.  Decision regarding hospitalization.        Final diagnoses:   Nausea and vomiting, unspecified vomiting  type   Dehydration   Debility       ED Disposition  ED Disposition       ED Disposition   Decision to Admit    Condition   --    Comment   Level of Care: Medical Telemetry [23]   Diagnosis: Vomiting [836711]   Admitting Physician: SHANELL PAVON [[id number]]   Certification: I Certify That Inpatient Hospital Services Are Medically Necessary For Greater Than 2 Midnights                 [facility]  [address] [address]  [phone]        Joe Bowden MD  [address]  [place] [id number]  [phone]      1 week post hospital discharge    Shandra Burroughs, APRN  [address]  [place] [address]  [phone]    Follow up in 1 week(s)      Shandra Burroughs APRN  [address]  [place] [id number]  [phone]          Joe Bowden MD  [address]  [place] [id number]  [phone]               Medication List        New Prescriptions      cefdinir 300 MG capsule  Commonly known as: OMNICEF  Take 1 capsule by mouth 2 (Two) Times a Day for 8 days.               Where to Get Your Medications        These medications were sent to [facility] - [place], [place] - [address]  - [phone]  - [phone]  [address] [facility] [address]      Phone: [phone]   cefdinir 300 MG capsule  gabapentin 400 MG capsule  LORazepam 2 MG tablet            Gabriela Jacobs, APRN  01/22/24 1955       Gabriela Jacobs, APRN  02/06/24 5659

## 2024-01-23 NOTE — H&P
[facility] Medicine Services  HISTORY & PHYSICAL    Patient Identification:  Name:  Leonie Laws  Age:  83 y.o.  Sex:  female  :  1940  MRN:  [id number]   Visit Number:  [id number]  Admit Date: 2024   Primary Care Physician:  Joe Bowden MD     Subjective     Chief complaint:   Chief Complaint   Patient presents with    Vomiting    Weakness - Generalized     History of presenting illness:   Patient is a 83 y.o. female with past medical history significant for anxiety, hyperlipidemia, trigeminal neuralgia, hypothyroidism that presented to the [facility] emergency department for evaluation of generalized weakness.     Patient examined at bedside in ED.  Numerous family members at bedside.  Patient's  states that the patient has been getting progressively weaker over the past several weeks.  Has had poor p.o. intake as well.  He notes that if he was able to get her to eat and drink, then she would because patient seems to be less weak, but he has trouble getting her to comply.  He notes that the nausea and vomiting began yesterday.  Son states patient has been exhibiting signs of dementia for the last couple years, but patient is very offended by any mention of possibly having dementia.  Son states it is difficult to get her to seek care, patient felt so ill that she was going to come to the ER.  Son states that last night she appeared to have aspirated on her vomit and has had an intermittent cough since then.  Mentation appears to be waxing and waning on exam.  Patient was oriented to self, but not location or time.  She was oriented x 3 earlier in the ED.  After my initial exam when I stepped out of the room to speak with family members, son brought to my attention that the patient was having rhythmic jerking of her right lower extremity he states has been intermittently happening over the past hour or so.    Upon arrival to the ED, vitals were  temperature 98.4, , RR 14, /75, SpO2 98% on room air.  Labs significant for corrected sodium 130, glucose 233.  Hemoglobin A1c 5.9.  CRP 25.25, lactate 2.2, WBC 18.79 with left shift.  Chest x-ray shows left basilar atelectasis versus pneumonia.  CT abdomen/pelvis shows no acute identified findings.  Moderate constipation.  Bibasilar airspace disease most consistent with atelectasis.  CT head with no acute findings.    Patient has been admitted to .  ---------------------------------------------------------------------------------------------------------------------   Review of Systems   Unable to perform ROS: Dementia      ---------------------------------------------------------------------------------------------------------------------   Past Medical History:   Diagnosis Date    Anxiety     Hyperlipidemia     Trigeminal neuralgia      Past Surgical History:   Procedure Laterality Date    BREAST SURGERY      HYSTERECTOMY       Family History   Problem Relation Age of Onset    Stroke Mother     Heart disease Father      Social History     Socioeconomic History    Marital status:    Tobacco Use    Smoking status: Never    Smokeless tobacco: Never   Substance and Sexual Activity    Alcohol use: No    Drug use: No     ---------------------------------------------------------------------------------------------------------------------   Allergies:  Doxycycline, Gentamycin [gentamicin], Miconazole, Penicillins, and Sulfa antibiotics  ---------------------------------------------------------------------------------------------------------------------   Medications below are reported home medications pulling from within the system; at this time, these medications have not been reconciled unless otherwise specified and are in the verification process for further verifcation as current home medications.    Prior to Admission Medications       Prescriptions Last Dose Informant Patient Reported? Taking?     Cholecalciferol (VITAMIN D-3) 1000 UNITS capsule   Yes No    Take 1 capsule by mouth.    estradiol (CLIMARA) 0.05 MG/24HR patch   No No    place one PATCH ON THE SKIN AS DIRECTED by provider one time PER WEEK    estradiol (MINIVELLE, VIVELLE-DOT) 0.05 MG/24HR patch   No No    Place 1 patch on the skin as directed by provider 1 (One) Time Per Week.    gabapentin (Neurontin) 400 MG capsule   No No    Take 1 capsule by mouth 2 (Two) Times a Day.    LORazepam (ATIVAN) 2 MG tablet   No No    Take 1 tablet by mouth At Night As Needed for Anxiety.    olopatadine (PATANOL) 0.1 % ophthalmic solution   No No    Administer 1 drop to both eyes 2 (Two) Times a Day.    SYNTHROID 100 MCG tablet   No No    Take 1 tablet by mouth Daily.          ---------------------------------------------------------------------------------------------------------------------    Objective     Hospital Scheduled Meds:            Current listed hospital scheduled medications may not yet reflect those currently placed in orders that are signed and held, awaiting patient's arrival to floor/unit.    ---------------------------------------------------------------------------------------------------------------------   Vital Signs:  Temp:  [98.4 °F (36.9 °C)] 98.4 °F (36.9 °C)  Heart Rate:  [] 98  Resp:  [14] 14  BP: (126-146)/(58-75) 135/61  Mean Arterial Pressure (Non-Invasive) for the past 24 hrs (Last 3 readings):   Noninvasive MAP (mmHg)   01/22/24 1855 90   01/22/24 1840 89   01/22/24 1825 89     SpO2 Percentage    01/22/24 1840 01/22/24 1855 01/22/24 2030   SpO2: 98% 96% 98%     SpO2:  [95 %-98 %] 98 %  on   ;   Device (Oxygen Therapy): room air    Body mass index is 19.46 kg/m².  Wt Readings from Last 3 Encounters:   01/22/24 58.1 kg (128 lb)   06/25/20 66.5 kg (146 lb 9.6 oz)   05/04/20 70.1 kg (154 lb 9.6 oz)     ---------------------------------------------------------------------------------------------------------------------    Physical Exam:  Physical Exam  Constitutional:       General: She is not in acute distress.     Appearance: Normal appearance.   HENT:      Head: Normocephalic and atraumatic.      Right Ear: External ear normal.      Left Ear: External ear normal.      Nose: No nasal deformity.      Mouth/Throat:      Lips: Pink.      Mouth: Mucous membranes are moist.   Eyes:      Conjunctiva/sclera: Conjunctivae normal.      Pupils: Pupils are equal, round, and reactive to light.   Cardiovascular:      Rate and Rhythm: Normal rate and regular rhythm.      Pulses:           Dorsalis pedis pulses are 2+ on the right side and 2+ on the left side.      Heart sounds: Normal heart sounds.   Pulmonary:      Effort: Pulmonary effort is normal.      Breath sounds: Normal breath sounds. No wheezing, rhonchi or rales.   Abdominal:      General: Abdomen is flat. Bowel sounds are normal.      Palpations: Abdomen is soft.      Tenderness: There is no guarding or rebound.   Genitourinary:     Comments: No lyle catheter in place   Musculoskeletal:      Cervical back: Neck supple. Normal range of motion.      Right lower leg: No edema.      Left lower leg: No edema.   Skin:     General: Skin is warm and dry.   Neurological:      General: No focal deficit present.      Mental Status: She is alert. She is disoriented.      Comments: Rhythmic jerking of right lower extremity     Oriented to self only    Psychiatric:         Mood and Affect: Mood normal.         Behavior: Behavior normal.       ---------------------------------------------------------------------------------------------------------------------  EKG: Sinus rhythm.  Heart rate 99.  Awaiting formal cardiology read.          I have personally reviewed the EKG/Telemetry strip  ---------------------------------------------------------------------------------------------------------------------   Results from last 7 days   Lab Units 01/22/24  1534   HSTROP T ng/L <6           Results  "from last 7 days   Lab Units 01/22/24  1930 01/22/24  1542 01/22/24  1534   CRP mg/dL  --   --  25.25*   LACTATE mmol/L 1.8 2.2*  --    WBC 10*3/mm3  --   --  18.78*   HEMOGLOBIN g/dL  --   --  12.6   HEMATOCRIT %  --   --  38.4   MCV fL  --   --  90.1   MCHC g/dL  --   --  32.8   PLATELETS 10*3/mm3  --   --  399     Results from last 7 days   Lab Units 01/22/24  1534   SODIUM mmol/L 127*   POTASSIUM mmol/L 4.6   MAGNESIUM mg/dL 2.0   CHLORIDE mmol/L 89*   CO2 mmol/L 23.9   BUN mg/dL 16   CREATININE mg/dL 0.95   CALCIUM mg/dL 9.7   GLUCOSE mg/dL 233*   ALBUMIN g/dL 2.7*   BILIRUBIN mg/dL 0.6   ALK PHOS U/L 118*   AST (SGOT) U/L 38*   ALT (SGPT) U/L 32   Estimated Creatinine Clearance: 41.9 mL/min (by C-G formula based on SCr of 0.95 mg/dL).  No results found for: \"AMMONIA\"    Hemoglobin A1C   Date/Time Value Ref Range Status   01/22/2024 1534 5.90 (H) 4.80 - 5.60 % Final     Glucose   Date/Time Value Ref Range Status   01/22/2024 1506 224 (H) 70 - 130 mg/dL Final     Lab Results   Component Value Date    HGBA1C 5.90 (H) 01/22/2024     Lab Results   Component Value Date    TSH 2.659 02/26/2019    FREET4 1.46 12/22/2016       No results found for: \"BLOODCX\"  No results found for: \"URINECX\"  No results found for: \"WOUNDCX\"  No results found for: \"STOOLCX\"  No results found for: \"RESPCX\"  No results found for: \"MRSACX\"  Pain Management Panel           No data to display              I have personally reviewed the above laboratory results.   ---------------------------------------------------------------------------------------------------------------------  Imaging Results (Last 7 Days)       Procedure Component Value Units Date/Time    CT Abdomen Pelvis With Contrast [[id number]] Collected: 01/22/24 1720     Updated: 01/22/24 1726    Narrative:      EXAM:    CT Abdomen and Pelvis With Intravenous Contrast     EXAM DATE:    1/22/2024 5:05 PM     CLINICAL HISTORY:    abd pain/sepsis     TECHNIQUE:    Axial computed " tomography images of the abdomen and pelvis with  intravenous contrast.  Sagittal and coronal reformatted images were  created and reviewed.  This CT exam was performed using one or more of  the following dose reduction techniques:  automated exposure control,  adjustment of the mA and/or kV according to patient size, and/or use of  iterative reconstruction technique.     COMPARISON:    No relevant prior studies available.     FINDINGS:    Lung bases:  Dependent bibasilar atelectasis. Lung bases otherwise  clear.    Heart:  Mild coronary artery calcifications.      ABDOMEN:    Liver:  Unremarkable as visualized.  No mass.    Gallbladder and bile ducts:  Cholecystectomy.  No ductal dilation.    Pancreas:  Mild fatty infiltration of the pancreas.  No ductal  dilation.    Spleen:  Unremarkable as visualized.  No splenomegaly.    Adrenals:  Unremarkable as visualized.  No mass.    Kidneys and ureters:  There are bilateral renal parapelvic cysts but  no evidence of hydronephrosis.  No renal or ureteral stones.  No  obstructive uropathy.    Stomach and bowel:  Stomach is incompletely distended.  Diffuse  colonic diverticulosis without evidence of acute diverticulitis.      PELVIS:    Appendix:  The appendix is not clearly identified.    Bladder:  Distended urinary bladder without wall thickening.    Reproductive:  Hysterectomy noted.      ABDOMEN and PELVIS:    Intraperitoneal space:  Unremarkable as visualized.  No significant  fluid collection.  No pneumoperitoneum.    Bones/joints:  Degenerative changes of the lumbar spine with mild  multilevel stenosis but no acute bony findings identified.  Arthritic  changes of the bilateral hip joints.  No dislocation.    Soft tissues:  Unremarkable as visualized.    Vasculature:  Advanced calcified atherosclerosis without evidence of  aneurysm.    Lymph nodes:  Unremarkable as visualized.  No enlarged lymph nodes.       Impression:      1.  No acute findings identified within  abdomen or pelvis.  2.  Urinary bladder distention without wall thickening.  3.  Prior cholecystectomy and hysterectomy.  4.  Bilateral renal parapelvic cysts. No obstructive uropathy.  5.  Colonic diverticulosis without diverticulitis.  6.  Moderate constipation. No bowel obstruction.  7.  Bibasilar airspace disease most consistent with atelectasis.  8.  Other incidental/nonacute findings above.        This report was finalized on 1/22/2024 5:24 PM by Dr. Fabrizio Fair MD.       CT Head Without Contrast [[id number]] Collected: 01/22/24 1718     Updated: 01/22/24 1722    Narrative:      EXAM:    CT Head Without Intravenous Contrast     EXAM DATE:    1/22/2024 5:05 PM     CLINICAL HISTORY:    headache     TECHNIQUE:    Axial computed tomography images of the head/brain without intravenous  contrast.  Sagittal and coronal reformatted images were created and  reviewed.  This CT exam was performed using one or more of the following  dose reduction techniques:  automated exposure control, adjustment of  the mA and/or kV according to patient size, and/or use of iterative  reconstruction technique.     COMPARISON:    No relevant prior studies available.     FINDINGS:    Brain and extra-axial spaces:  Unremarkable as visualized.  No  hemorrhage.  No significant white matter disease.  No edema.  No  ventriculomegaly. Mild age-related atrophy noted.    Bones/joints:  Unremarkable as visualized.  No acute fracture.    Soft tissues:  Unremarkable as visualized.    Sinuses:  Unremarkable as visualized.  No acute sinusitis.    Mastoid air cells:  Unremarkable as visualized.  No mastoid effusion.       Impression:        Unremarkable exam demonstrating no CT evidence of acute intracranial  findings.        This report was finalized on 1/22/2024 5:20 PM by Dr. Fabrizio Fair MD.       XR Chest 1 View [[id number]] Collected: 01/22/24 1615     Updated: 01/22/24 1617    Narrative:      PROCEDURE: XR CHEST 1 VW-       HISTORY: Cough,  vomiting     COMPARISON: None.     FINDINGS: The heart is normal in size. The mediastinum is unremarkable.  There are low lung volumes with a left basilar opacity either  atelectasis or pneumonia. There are no effusions. There is no  pneumothorax. There are no acute osseous abnormalities.       Impression:      Low lung volumes with left basilar atelectasis versus  pneumonia.        This report was finalized on 1/22/2024 4:15 PM by David Ch M.D..             I have personally reviewed the above radiology results.     Last Echocardiogram:    ---------------------------------------------------------------------------------------------------------------------    Assessment & Plan      Active Hospital Problems    Diagnosis  POA    **Vomiting [R11.10]  Yes     Nausea with vomiting, POA  Generalized weakness, POA  SIRS versus severe sepsis, POA   Dementia  Generalized weakness over the past several weeks.  Nausea with vomiting over the past 24 hours.  Continue as needed Zofran for nausea management.  Patient does meet SIRS criteria at this time without definite source of infection for sepsis. Continue to monitor vital signs. HR>90 WBC >12  There is a concern for possible aspiration pneumonia given vomiting and apparent witnessed aspiration per son, as well as elevated inflammatory markers on initial lab work.  Respiratory PCR negative   SLP consult to evaluate risk of aspiration.  Antibiotic coverage with vancomycin, Azactam and Flagyl to cover for suspected aspiration pneumonia/pneumonitis.  Blood cultures ordered at ER.  PT/OT consults due to generalized weakness.  Tremor versus seizure-like activity, POA  Patient with intermittent rhythmic moving of right lower extremity.  Cerebell ordered while patient in ED, current seizure burden 0%, will continue to monitor on Cerebell for ~1hr before moving to the floor.   Hyponatremia, POA  Corrected sodium of 130.  Small send NS given in ED.  Continue IV NS 30 mill  per hour.  Repeat CMP in the a.m.  Hyperglycemia without diagnosis of type II diabetes mellitus,  POA  History of prediabetes  Glucose 233 in the ED.  Hemoglobin A1c 5.9.  Family notes history of prediabetes but no history of type II DM.  Accu-Cheks ordered to trend glucose, hypoglycemia protocol in place.  CHRONIC MEDICAL PROBLEMS  Anxiety  Hyperlipidemia  Trigeminal neuralgia  Hypothyroidism  Restart home meds pending formal med rec.  TSH ordered.  Supportive care    F/E/N: IV NS at 100 mill per hour.  Continue monitor electrolytes.  NPO.    ---------------------------------------------------  DVT Prophylaxis: subcu lovenox  Activity: up with assistance     The patient is considered to be a high risk patient due to: generalized weakness, SIRS     INPATIENT status due to the need for care which can only be reasonably provided in an hospital setting such as aggressive/expedited ancillary services and/or consultation services, the necessity for IV medications, close physician monitoring and/or the possible need for procedures.  In such, I feel patient’s risk for adverse outcomes and need for care warrant INPATIENT evaluation and predict the patient’s care encounter to likely last beyond 2 midnights.      Code Status: DNR/DNI. Discussed with  and son at bedside.     Disposition/Discharge planning: Pending clinical course, may need placement, CM/SS consulted     I have discussed the patient's assessment and plan with Dr. Baylee Castro PA-C  Hospitalist Service -- [facility]       01/22/24  20:48 EST    Attending Physician: Dr. Beach

## 2024-01-23 NOTE — CONSULTS
Referring Provider: Dr. Clarke  Reason for Consultation: capacity    Chief complaint/Focus of Exam: capacity    Subjective .     History of present illness:    Pt is an 83y F with a history of dementia, hypothyroidism who was admitted on 1/22/2024 for management of weakness, hyponatremia, possible sepsis.  Psychiatry consulted for medical decision making capacity.    Patient is awake, alert and oriented to person, some aspects of place and year.  Patient's friend is seated at the bedside and brother joined us part of the way through the assessment, with patient's permission.  Patient formerly a nurse.  Patient reports living alone, saying that she has help at home and has no issues living there by herself.  Unfortunately, it was noted by several, including family, the patient is unable to care for herself at home, was found soiled having fallen at home.  I spoke to the brother in the hallway and he reports that patient's dementia has worsened.  He concurs that patient is unable to care for herself adequately and that she requires significant assistance at home at minimum.  The help that patient mentioned is described as her son, who has his own medical issues and does not live immediately beside her, so the brother does not feel that that is an appropriate option for regular assistance for the patient.  Patient is agreeable to rehab referral, so appears reasonable to some degree.  Patient does not appear to grasp the severity of her illness and disability at home.    Review of Systems  Pertinent items are noted in HPI, all other systems reviewed and negative    History  Past Medical History:   Diagnosis Date    Anxiety     Hyperlipidemia     Trigeminal neuralgia    ,   Past Surgical History:   Procedure Laterality Date    BREAST SURGERY      HYSTERECTOMY     ,   Family History   Problem Relation Age of Onset    Stroke Mother     Heart disease Father    ,   Social History     Socioeconomic History    Marital status:     Tobacco Use    Smoking status: Never    Smokeless tobacco: Never   Vaping Use    Vaping Use: Never used   Substance and Sexual Activity    Alcohol use: No    Drug use: No    Sexual activity: Defer     E-cigarette/Vaping    E-cigarette/Vaping Use Never User     Passive Exposure No     Counseling Given No      E-cigarette/Vaping Substances    Nicotine No     THC No     CBD No     Flavoring No      E-cigarette/Vaping Devices    Disposable No     Pre-filled or Refillable Cartridge No     Refillable Tank No     Pre-filled Pod No          ,   Medications Prior to Admission   Medication Sig Dispense Refill Last Dose    atorvastatin (LIPITOR) 20 MG tablet Take 1 tablet by mouth Daily.   Unknown    buPROPion XL (WELLBUTRIN XL) 150 MG 24 hr tablet Take 1 tablet by mouth 3 times a day.   Unknown    gabapentin (NEURONTIN) 400 MG capsule Take 1 capsule by mouth Every Night.   Unknown    LORazepam (ATIVAN) 2 MG tablet Take 1 tablet by mouth Every Night.   Unknown    meclizine (ANTIVERT) 25 MG tablet Take 1 tablet by mouth 3 (Three) Times a Day As Needed for Dizziness.   Unknown    megestrol (MEGACE) 40 MG/ML suspension Take 5 mL by mouth 3 times a day.       omeprazole (priLOSEC) 20 MG capsule Take 1 capsule by mouth Daily.   Unknown    ondansetron (ZOFRAN) 8 MG tablet Take 1 tablet by mouth Every 8 (Eight) Hours As Needed for Nausea or Vomiting.   Unknown   , Scheduled Meds:  aztreonam, 2 g, Intravenous, Q8H  heparin (porcine), 5,000 Units, Subcutaneous, Q12H  insulin lispro, 2-7 Units, Subcutaneous, 4x Daily AC & at Bedtime  metroNIDAZOLE, 500 mg, Intravenous, Q8H  senna-docusate sodium, 2 tablet, Oral, BID  sodium chloride, 10 mL, Intravenous, Q12H  vancomycin, 1,000 mg, Intravenous, Q24H   , Continuous Infusions:  sodium chloride, 100 mL/hr, Last Rate: 100 mL/hr (01/23/24 1315)   , PRN Meds:    acetaminophen **OR** acetaminophen    senna-docusate sodium **AND** polyethylene glycol **AND** bisacodyl **AND**  bisacodyl    dextrose    dextrose    glucagon (human recombinant)    nitroglycerin    ondansetron    [COMPLETED] Insert Peripheral IV **AND** sodium chloride    sodium chloride    sodium chloride, and Allergies:  Doxycycline, Gentamycin [gentamicin], Miconazole, Penicillins, and Sulfa antibiotics    Objective     Vital Signs   Temp:  [98.4 °F (36.9 °C)-101.3 °F (38.5 °C)] 98.4 °F (36.9 °C)  Heart Rate:  [] 81  Resp:  [14-16] 16  BP: ()/(50-75) 108/56    Mental Status Exam:  Hygiene:   good  Cooperation:  Cooperative  Eye Contact:  Fair  Psychomotor Behavior:  Slow  Affect:  Restricted  Hopelessness: Denies  Speech:  Normal  Thought Progress:  Linear  Thought Content:  Normal  Suicidal:  None  Homicidal:  None  Hallucinations:  None  Delusion:  None  Memory:  Deficits  Orientation:  Person, Place, and Situation  Reliability:  fair  Insight:  Poor  Judgement:  Fair  Impulse Control:  Fair    Results Review:   I reviewed the patient's new clinical results.  I reviewed the patient's other test results and agree with the interpretation  I personally viewed and interpreted the patient's EKG/Telemetry data  Lab Results (last 24 hours)       Procedure Component Value Units Date/Time    Folate [[id number]]  (Abnormal) Collected: 01/23/24 0509    Specimen: Blood Updated: 01/23/24 1228     Folate 2.57 ng/mL     Narrative:      Results may be falsely increased if patient taking Biotin.      Vitamin B12 [[id number]]  (Normal) Collected: 01/23/24 0509    Specimen: Blood Updated: 01/23/24 1228     Vitamin B-12 889 pg/mL     Narrative:      Results may be falsely increased if patient taking Biotin.      Blood Culture - Blood, Arm, Left [[id number]]  (Abnormal) Collected: 01/22/24 1542    Specimen: Blood from Arm, Left Updated: 01/23/24 1128     Blood Culture Abnormal Stain     Gram Stain Anaerobic Bottle Gram positive cocci in pairs and chains    Narrative:      No BCID performed, refer to previous blood culture specimen  collected on 1/22/24.      Blood Culture - Blood, Arm, Right [[id number]]  (Abnormal) Collected: 01/22/24 1548    Specimen: Blood from Arm, Right Updated: 01/23/24 1122     Blood Culture Abnormal Stain     Gram Stain Aerobic Bottle Gram positive cocci in pairs and chains      Anaerobic Bottle Gram positive cocci in pairs and chains    Blood Culture ID, PCR - Blood, Arm, Right [[id number]]  (Abnormal) Collected: 01/22/24 1548    Specimen: Blood from Arm, Right Updated: 01/23/24 1121     BCID, PCR Streptococcus pyogenes (Group A). Identification by BCID2 PCR.     BOTTLE TYPE Aerobic Bottle    POC Glucose Once [[id number]]  (Normal) Collected: 01/23/24 1053    Specimen: Blood Updated: 01/23/24 1059     Glucose 98 mg/dL     POC Glucose Once [[id number]]  (Normal) Collected: 01/23/24 0718    Specimen: Blood Updated: 01/23/24 0725     Glucose 118 mg/dL     Comprehensive Metabolic Panel [[id number]]  (Abnormal) Collected: 01/23/24 0509    Specimen: Blood Updated: 01/23/24 0625     Glucose 138 mg/dL      BUN 17 mg/dL      Creatinine 0.85 mg/dL      Sodium 130 mmol/L      Potassium 3.9 mmol/L      Chloride 97 mmol/L      CO2 25.7 mmol/L      Calcium 8.7 mg/dL      Total Protein 6.5 g/dL      Albumin 2.3 g/dL      ALT (SGPT) 28 U/L      AST (SGOT) 36 U/L      Alkaline Phosphatase 140 U/L      Total Bilirubin 0.3 mg/dL      Globulin 4.2 gm/dL      A/G Ratio 0.5 g/dL      BUN/Creatinine Ratio 20.0     Anion Gap 7.3 mmol/L      eGFR 68.5 mL/min/1.73     Narrative:      GFR Normal >60  Chronic Kidney Disease <60  Kidney Failure <15    The GFR formula is only valid for adults with stable renal function between ages 18 and 70.    CBC & Differential [[id number]]  (Abnormal) Collected: 01/23/24 0509    Specimen: Blood Updated: 01/23/24 0606    Narrative:      The following orders were created for panel order CBC & Differential.  Procedure                               Abnormality         Status                     ---------                                -----------         ------                     CBC Auto Differential[[id number]]        Abnormal            Final result                 Please view results for these tests on the individual orders.    CBC Auto Differential [[id number]]  (Abnormal) Collected: 01/23/24 0509    Specimen: Blood Updated: 01/23/24 0606     WBC 16.16 10*3/mm3      RBC 3.18 10*6/mm3      Hemoglobin 9.3 g/dL      Hematocrit 27.9 %      MCV 87.7 fL      MCH 29.2 pg      MCHC 33.3 g/dL      RDW 14.2 %      RDW-SD 45.8 fl      MPV 10.4 fL      Platelets 340 10*3/mm3      Neutrophil % 88.3 %      Lymphocyte % 4.6 %      Monocyte % 6.2 %      Eosinophil % 0.0 %      Basophil % 0.2 %      Immature Grans % 0.7 %      Neutrophils, Absolute 14.25 10*3/mm3      Lymphocytes, Absolute 0.75 10*3/mm3      Monocytes, Absolute 1.00 10*3/mm3      Eosinophils, Absolute 0.00 10*3/mm3      Basophils, Absolute 0.04 10*3/mm3      Immature Grans, Absolute 0.12 10*3/mm3      nRBC 0.0 /100 WBC     Respiratory Panel PCR w/COVID-19(SARS-CoV-2) NEIL/EDMUNDO/MK/PAD/[facility]IS[facility] In-House, NP Swab in UTM/VTM, 2 HR TAT - Swab, Nasopharynx [[id number]]  (Normal) Collected: 01/23/24 0239    Specimen: Swab from Nasopharynx Updated: 01/23/24 0342     ADENOVIRUS, PCR Not Detected     Coronavirus 229E Not Detected     Coronavirus HKU1 Not Detected     Coronavirus NL63 Not Detected     Coronavirus OC43 Not Detected     COVID19 Not Detected     Human Metapneumovirus Not Detected     Human Rhinovirus/Enterovirus Not Detected     Influenza A PCR Not Detected     Influenza B PCR Not Detected     Parainfluenza Virus 1 Not Detected     Parainfluenza Virus 2 Not Detected     Parainfluenza Virus 3 Not Detected     Parainfluenza Virus 4 Not Detected     RSV, PCR Not Detected     Bordetella pertussis pcr Not Detected     Bordetella parapertussis PCR Not Detected     Chlamydophila pneumoniae PCR Not Detected     Mycoplasma pneumo by PCR Not Detected    Narrative:      In the setting of a  positive respiratory panel with a viral infection PLUS a negative procalcitonin without other underlying concern for bacterial infection, consider observing off antibiotics or discontinuation of antibiotics and continue supportive care. If the respiratory panel is positive for atypical bacterial infection (Bordetella pertussis, Chlamydophila pneumoniae, or Mycoplasma pneumoniae), consider antibiotic de-escalation to target atypical bacterial infection.    TSH [[id number]]  (Normal) Collected: 01/22/24 1534    Specimen: Blood from Arm, Left Updated: 01/22/24 2355     TSH 0.897 uIU/mL     Magnesium [[id number]]  (Normal) Collected: 01/22/24 1534    Specimen: Blood from Arm, Left Updated: 01/22/24 2342     Magnesium 2.0 mg/dL     Respiratory Panel PCR w/COVID-19(SARS-CoV-2) NEIL/EDMUNDO/MK/PAD/COR/[facility]IS[facility] In-House, NP Swab in UTM/VTM, 2 HR TAT - Swab, Nasopharynx [[id number]]  (Normal) Collected: 01/22/24 2006    Specimen: Swab from Nasopharynx Updated: 01/22/24 2058     ADENOVIRUS, PCR Not Detected     Coronavirus 229E Not Detected     Coronavirus HKU1 Not Detected     Coronavirus NL63 Not Detected     Coronavirus OC43 Not Detected     COVID19 Not Detected     Human Metapneumovirus Not Detected     Human Rhinovirus/Enterovirus Not Detected     Influenza A PCR Not Detected     Influenza B PCR Not Detected     Parainfluenza Virus 1 Not Detected     Parainfluenza Virus 2 Not Detected     Parainfluenza Virus 3 Not Detected     Parainfluenza Virus 4 Not Detected     RSV, PCR Not Detected     Bordetella pertussis pcr Not Detected     Bordetella parapertussis PCR Not Detected     Chlamydophila pneumoniae PCR Not Detected     Mycoplasma pneumo by PCR Not Detected    Narrative:      In the setting of a positive respiratory panel with a viral infection PLUS a negative procalcitonin without other underlying concern for bacterial infection, consider observing off antibiotics or discontinuation of antibiotics and continue supportive care. If  the respiratory panel is positive for atypical bacterial infection (Bordetella pertussis, Chlamydophila pneumoniae, or Mycoplasma pneumoniae), consider antibiotic de-escalation to target atypical bacterial infection.    STAT Lactic Acid, Reflex [[id number]]  (Normal) Collected: 01/22/24 1930    Specimen: Blood from Arm, Right Updated: 01/22/24 1949     Lactate 1.8 mmol/L     Urinalysis With Microscopic If Indicated (No Culture) - Straight Cath [[id number]]  (Abnormal) Collected: 01/22/24 1850    Specimen: Urine from Straight Cath Updated: 01/22/24 1909     Color, UA Yellow     Appearance, UA Cloudy     pH, UA 6.5     Specific Gravity, UA >1.030     Glucose, UA Negative     Ketones, UA Trace     Bilirubin, UA Negative     Blood, UA Small (1+)     Protein,  mg/dL (2+)     Leuk Esterase, UA Negative     Nitrite, UA Negative     Urobilinogen, UA 1.0 E.U./dL    Urinalysis, Microscopic Only - Straight Cath [[id number]]  (Abnormal) Collected: 01/22/24 1850    Specimen: Urine from Straight Cath Updated: 01/22/24 1909     RBC, UA 0-2 /HPF      WBC, UA 0-2 /HPF      Bacteria, UA Trace /HPF      Squamous Epithelial Cells, UA 0-2 /HPF      Hyaline Casts, UA 0-2 /LPF      Methodology Manual Light Microscopy    Hemoglobin A1c [[id number]]  (Abnormal) Collected: 01/22/24 1534    Specimen: Blood from Arm, Left Updated: 01/22/24 1851     Hemoglobin A1C 5.90 %     Narrative:      Hemoglobin A1C Ranges:    Increased Risk for Diabetes  5.7% to 6.4%  Diabetes                     >= 6.5%  Diabetic Goal                < 7.0%    [place] Draw [[id number]] Collected: 01/22/24 1534    Specimen: Blood from Arm, Left Updated: 01/22/24 1646    Narrative:      The following orders were created for panel order [place] Draw.  Procedure                               Abnormality         Status                     ---------                               -----------         ------                     Green Top (Gel)[[id number]]                                   Final result               Lavender Top[[id number]]                                     Final result               Gold Top - SST[[id number]]                                   Final result               Light Blue Top[[id number]]                                   Final result                 Please view results for these tests on the individual orders.    Lavender Top [[id number]] Collected: 01/22/24 1534    Specimen: Blood from Arm, Left Updated: 01/22/24 1646     Extra Tube hold for add-on     Comment: Auto resulted       Gold Top - SST [[id number]] Collected: 01/22/24 1534    Specimen: Blood from Arm, Left Updated: 01/22/24 1646     Extra Tube Hold for add-ons.     Comment: Auto resulted.       Light Blue Top [[id number]] Collected: 01/22/24 1534    Specimen: Blood from Arm, Left Updated: 01/22/24 1646     Extra Tube Hold for add-ons.     Comment: Auto resulted       Green Top (Gel) [[id number]] Collected: 01/22/24 1534    Specimen: Blood from Arm, Left Updated: 01/22/24 1627     Extra Tube Hold for add-ons.     Comment: Auto resulted.       Lactic Acid, Plasma [[id number]]  (Abnormal) Collected: 01/22/24 1542    Specimen: Blood from Arm, Left Updated: 01/22/24 1621     Lactate 2.2 mmol/L     Comprehensive Metabolic Panel [[id number]]  (Abnormal) Collected: 01/22/24 1534    Specimen: Blood from Arm, Left Updated: 01/22/24 1618     Glucose 233 mg/dL      BUN 16 mg/dL      Creatinine 0.95 mg/dL      Sodium 127 mmol/L      Potassium 4.6 mmol/L      Comment: Slight hemolysis detected by analyzer. Result may be falsely elevated.        Chloride 89 mmol/L      CO2 23.9 mmol/L      Calcium 9.7 mg/dL      Total Protein 8.2 g/dL      Albumin 2.7 g/dL      ALT (SGPT) 32 U/L      AST (SGOT) 38 U/L      Alkaline Phosphatase 118 U/L      Total Bilirubin 0.6 mg/dL      Globulin 5.5 gm/dL      A/G Ratio 0.5 g/dL      BUN/Creatinine Ratio 16.8     Anion Gap 14.1 mmol/L      eGFR 59.9 mL/min/1.73     Narrative:      GFR Normal  >60  Chronic Kidney Disease <60  Kidney Failure <15    The GFR formula is only valid for adults with stable renal function between ages 18 and 70.    Magnesium [[id number]]  (Normal) Collected: 01/22/24 1534    Specimen: Blood from Arm, Left Updated: 01/22/24 1618     Magnesium 2.0 mg/dL     Lipase [[id number]]  (Abnormal) Collected: 01/22/24 1534    Specimen: Blood from Arm, Left Updated: 01/22/24 1618     Lipase 8 U/L     C-reactive Protein [[id number]]  (Abnormal) Collected: 01/22/24 1534    Specimen: Blood from Arm, Left Updated: 01/22/24 1618     C-Reactive Protein 25.25 mg/dL     Phosphorus [[id number]]  (Normal) Collected: 01/22/24 1534    Specimen: Blood from Arm, Left Updated: 01/22/24 1618     Phosphorus 3.7 mg/dL     Single High Sensitivity Troponin T [[id number]]  (Normal) Collected: 01/22/24 1534    Specimen: Blood from Arm, Left Updated: 01/22/24 1616     HS Troponin T <6 ng/L     Narrative:      High Sensitive Troponin T Reference Range:  <14.0 ng/L- Negative Female for AMI  <22.0 ng/L- Negative Male for AMI  >=14 - Abnormal Female indicating possible myocardial injury.  >=22 - Abnormal Male indicating possible myocardial injury.   Clinicians would have to utilize clinical acumen, EKG, Troponin, and serial changes to determine if it is an Acute Myocardial Infarction or myocardial injury due to an underlying chronic condition.         Sedimentation Rate [[id number]]  (Abnormal) Collected: 01/22/24 1534    Specimen: Blood from Arm, Left Updated: 01/22/24 1559     Sed Rate 108 mm/hr     CBC & Differential [[id number]]  (Abnormal) Collected: 01/22/24 1534    Specimen: Blood from Arm, Left Updated: 01/22/24 1554    Narrative:      The following orders were created for panel order CBC & Differential.  Procedure                               Abnormality         Status                     ---------                               -----------         ------                     CBC Auto Differential[[id number]]         Abnormal            Final result                 Please view results for these tests on the individual orders.    CBC Auto Differential [[id number]]  (Abnormal) Collected: 01/22/24 1534    Specimen: Blood from Arm, Left Updated: 01/22/24 1554     WBC 18.78 10*3/mm3      RBC 4.26 10*6/mm3      Hemoglobin 12.6 g/dL      Hematocrit 38.4 %      MCV 90.1 fL      MCH 29.6 pg      MCHC 32.8 g/dL      RDW 14.4 %      RDW-SD 47.3 fl      MPV 9.7 fL      Platelets 399 10*3/mm3      Neutrophil % 91.7 %      Lymphocyte % 2.1 %      Monocyte % 4.8 %      Eosinophil % 0.0 %      Basophil % 0.4 %      Immature Grans % 1.0 %      Neutrophils, Absolute 17.21 10*3/mm3      Lymphocytes, Absolute 0.40 10*3/mm3      Monocytes, Absolute 0.91 10*3/mm3      Eosinophils, Absolute 0.00 10*3/mm3      Basophils, Absolute 0.08 10*3/mm3      Immature Grans, Absolute 0.18 10*3/mm3      nRBC 0.0 /100 WBC     POC Glucose Once [[id number]]  (Abnormal) Collected: 01/22/24 1506    Specimen: Blood Updated: 01/22/24 1512     Glucose 224 mg/dL           Imaging Results (Last 24 Hours)       Procedure Component Value Units Date/Time    CT Abdomen Pelvis With Contrast [[id number]] Collected: 01/22/24 1720     Updated: 01/22/24 1726    Narrative:      EXAM:    CT Abdomen and Pelvis With Intravenous Contrast     EXAM DATE:    1/22/2024 5:05 PM     CLINICAL HISTORY:    abd pain/sepsis     TECHNIQUE:    Axial computed tomography images of the abdomen and pelvis with  intravenous contrast.  Sagittal and coronal reformatted images were  created and reviewed.  This CT exam was performed using one or more of  the following dose reduction techniques:  automated exposure control,  adjustment of the mA and/or kV according to patient size, and/or use of  iterative reconstruction technique.     COMPARISON:    No relevant prior studies available.     FINDINGS:    Lung bases:  Dependent bibasilar atelectasis. Lung bases otherwise  clear.    Heart:  Mild coronary artery  calcifications.      ABDOMEN:    Liver:  Unremarkable as visualized.  No mass.    Gallbladder and bile ducts:  Cholecystectomy.  No ductal dilation.    Pancreas:  Mild fatty infiltration of the pancreas.  No ductal  dilation.    Spleen:  Unremarkable as visualized.  No splenomegaly.    Adrenals:  Unremarkable as visualized.  No mass.    Kidneys and ureters:  There are bilateral renal parapelvic cysts but  no evidence of hydronephrosis.  No renal or ureteral stones.  No  obstructive uropathy.    Stomach and bowel:  Stomach is incompletely distended.  Diffuse  colonic diverticulosis without evidence of acute diverticulitis.      PELVIS:    Appendix:  The appendix is not clearly identified.    Bladder:  Distended urinary bladder without wall thickening.    Reproductive:  Hysterectomy noted.      ABDOMEN and PELVIS:    Intraperitoneal space:  Unremarkable as visualized.  No significant  fluid collection.  No pneumoperitoneum.    Bones/joints:  Degenerative changes of the lumbar spine with mild  multilevel stenosis but no acute bony findings identified.  Arthritic  changes of the bilateral hip joints.  No dislocation.    Soft tissues:  Unremarkable as visualized.    Vasculature:  Advanced calcified atherosclerosis without evidence of  aneurysm.    Lymph nodes:  Unremarkable as visualized.  No enlarged lymph nodes.       Impression:      1.  No acute findings identified within abdomen or pelvis.  2.  Urinary bladder distention without wall thickening.  3.  Prior cholecystectomy and hysterectomy.  4.  Bilateral renal parapelvic cysts. No obstructive uropathy.  5.  Colonic diverticulosis without diverticulitis.  6.  Moderate constipation. No bowel obstruction.  7.  Bibasilar airspace disease most consistent with atelectasis.  8.  Other incidental/nonacute findings above.        This report was finalized on 1/22/2024 5:24 PM by Dr. Fabrizio Fair MD.       CT Head Without Contrast [[id number]] Collected: 01/22/24 3519      Updated: 01/22/24 1722    Narrative:      EXAM:    CT Head Without Intravenous Contrast     EXAM DATE:    1/22/2024 5:05 PM     CLINICAL HISTORY:    headache     TECHNIQUE:    Axial computed tomography images of the head/brain without intravenous  contrast.  Sagittal and coronal reformatted images were created and  reviewed.  This CT exam was performed using one or more of the following  dose reduction techniques:  automated exposure control, adjustment of  the mA and/or kV according to patient size, and/or use of iterative  reconstruction technique.     COMPARISON:    No relevant prior studies available.     FINDINGS:    Brain and extra-axial spaces:  Unremarkable as visualized.  No  hemorrhage.  No significant white matter disease.  No edema.  No  ventriculomegaly. Mild age-related atrophy noted.    Bones/joints:  Unremarkable as visualized.  No acute fracture.    Soft tissues:  Unremarkable as visualized.    Sinuses:  Unremarkable as visualized.  No acute sinusitis.    Mastoid air cells:  Unremarkable as visualized.  No mastoid effusion.       Impression:        Unremarkable exam demonstrating no CT evidence of acute intracranial  findings.        This report was finalized on 1/22/2024 5:20 PM by Dr. Fabrizio Fair MD.       XR Chest 1 View [[id number]] Collected: 01/22/24 1615     Updated: 01/22/24 1617    Narrative:      PROCEDURE: XR CHEST 1 VW-       HISTORY: Cough, vomiting     COMPARISON: None.     FINDINGS: The heart is normal in size. The mediastinum is unremarkable.  There are low lung volumes with a left basilar opacity either  atelectasis or pneumonia. There are no effusions. There is no  pneumothorax. There are no acute osseous abnormalities.       Impression:      Low lung volumes with left basilar atelectasis versus  pneumonia.        This report was finalized on 1/22/2024 4:15 PM by David Ch M.D..                 Assessment & Plan     Principal Problem:    Vomiting     Medical decision  making capacity  -Patient's safety and health is definitely a concern at this point after speaking with her brother and friend.  Given patient's declining functional status, increased health needs, worsening dementia, patient does not appear to retain appropriate medical decision making capacity at this time.  -I will leave emergency guardianship documentation and the next progress note    I discussed the patient's findings and my recommendations with patient and family    Richar Main MD  01/23/24  14:18 EST

## 2024-01-23 NOTE — PLAN OF CARE
Goal Outcome Evaluation:                 No acute changes at this time pt is resting well no n/v at this time will continue to monitor and follow current plan of care.

## 2024-01-23 NOTE — PLAN OF CARE
Goal Outcome Evaluation:                      Patient is in bed at this time. Family is at bedside. Patient is alert to self and place. No acute changes at this time or complaints per pt. Will continue POC.

## 2024-01-23 NOTE — PROGRESS NOTES
[facility] HOSPITALIST PROGRESS NOTE     Patient Identification:  Name:  Leonie Laws  Age:  83 y.o.  Sex:  female  :  1940  MRN:  [id number]  Visit Number:  [id number]  Primary Care Provider:  Joe Bowden MD    Length of stay:  1    Chief complaint: Nausea and vomiting    Subjective:    Patient seen and examined at bedside with patient's son and daughter-in-law present.  Patient is able to answer most questions involving her immediate surroundings.  However, patient does demonstrate intermittent confusion.  Did discuss patient's overall presentation with her son at bedside.  Patient's son does express some concern regarding her current home situation.  Patient's son reports coming into the patient's home and finding her covered in vomit and urine.  Patient's  was reportedly sitting in close proximity to the patient with little to no concern regarding patient's overall condition.  Patient's son is unclear if the patient's  has intentionally neglected the patient or if there is some possible cognitive decline/dementia preventing him from being able to take adequate care of the patient.  Nevertheless, there is concern regarding safety of patient to return home under current circumstances.  As such, I have notified case management to assist in safe discharge disposition planning.  Will also consult psychiatry services for formal evaluation regarding competency/medical decision making capacity.  ----------------------------------------------------------------------------------------------------------------------  Current Hospital Meds:  aztreonam, 2 g, Intravenous, Q8H  heparin (porcine), 5,000 Units, Subcutaneous, Q12H  insulin lispro, 2-7 Units, Subcutaneous, 4x Daily AC & at Bedtime  metroNIDAZOLE, 500 mg, Intravenous, Q8H  senna-docusate sodium, 2 tablet, Oral, BID  sodium chloride, 10 mL, Intravenous, Q12H  vancomycin, 1,000 mg, Intravenous, Q24H      sodium  chloride, 100 mL/hr, Last Rate: 100 mL/hr (01/23/24 1315)      ----------------------------------------------------------------------------------------------------------------------  Vital Signs:  Temp:  [98.4 °F (36.9 °C)-101.3 °F (38.5 °C)] 98.4 °F (36.9 °C)  Heart Rate:  [] 81  Resp:  [14-16] 16  BP: ()/(50-75) 108/56      01/22/24  1515 01/22/24  2315   Weight: 58.1 kg (128 lb) 56.6 kg (124 lb 12.8 oz)     Body mass index is 18.98 kg/m².    Intake/Output Summary (Last 24 hours) at 1/23/2024 1422  Last data filed at 1/23/2024 1228  Gross per 24 hour   Intake 2405 ml   Output 100 ml   Net 2305 ml     Diet: Regular/House Diet; Feeding Assistance - Nursing; Texture: Soft to Chew (NDD 3); Soft to Chew: Chopped Meat; Fluid Consistency: Thin (IDDSI 0)  ----------------------------------------------------------------------------------------------------------------------  Physical exam:  Constitutional: Well-nourished  female in no apparent distress.     HENT:  Head:  Normocephalic and atraumatic.  Mouth:  Moist mucous membranes.    Eyes:  Conjunctivae and EOM are normal.  Pupils are equal, round, and reactive to light.  No scleral icterus.    Neck:  Neck supple. No thyromegaly.  No JVD present.    Cardiovascular:  Regular rate and rhythm with no murmurs, rubs, clicks or gallops appreciated.  Pulmonary/Chest:  Clear to auscultation bilaterally with no crackles, wheezes or rhonchi appreciated.  Abdominal:  Soft. Nontender. Nondistended  Bowel sounds are normal in all four quadrants. No organomegally appreciated.   Musculoskeletal:  No edema, no tenderness, and no deformity.  No red or swollen joints anywhere.    Neurological:  Alert, Cranial nerves II-XII intact with no focal deficits.  No facial droop.  No slurred speech.   Skin:  Warm and dry to palpation with no rashes or lesions appreciated.  Peripheral vascular:  2+ radial and pedal pulses in bilateral upper and lower extremities.  Psychiatric:   "Alert and oriented to immediate surroundings, but does have flat affect and does not appear to completely understand her current medical condition  ------------------------------------------------------------------------------------------------------------  ----------------------------------------------------------------------------------------------------------------------  Results from last 7 days   Lab Units 01/22/24  1534   HSTROP T ng/L <6     Results from last 7 days   Lab Units 01/23/24  0509 01/22/24  1930 01/22/24  1542 01/22/24  1534   CRP mg/dL  --   --   --  25.25*   LACTATE mmol/L  --  1.8 2.2*  --    WBC 10*3/mm3 16.16*  --   --  18.78*   HEMOGLOBIN g/dL 9.3*  --   --  12.6   HEMATOCRIT % 27.9*  --   --  38.4   MCV fL 87.7  --   --  90.1   MCHC g/dL 33.3  --   --  32.8   PLATELETS 10*3/mm3 340  --   --  399         Results from last 7 days   Lab Units 01/23/24  0509 01/22/24  1534   SODIUM mmol/L 130* 127*   POTASSIUM mmol/L 3.9 4.6   MAGNESIUM mg/dL  --  2.0  2.0   CHLORIDE mmol/L 97* 89*   CO2 mmol/L 25.7 23.9   BUN mg/dL 17 16   CREATININE mg/dL 0.85 0.95   CALCIUM mg/dL 8.7 9.7   GLUCOSE mg/dL 138* 233*   ALBUMIN g/dL 2.3* 2.7*   BILIRUBIN mg/dL 0.3 0.6   ALK PHOS U/L 140* 118*   AST (SGOT) U/L 36* 38*   ALT (SGPT) U/L 28 32   Estimated Creatinine Clearance: 45.6 mL/min (by C-G formula based on SCr of 0.85 mg/dL).    No results found for: \"AMMONIA\"      Blood Culture   Date Value Ref Range Status   01/22/2024 Abnormal Stain (C)  Preliminary   01/22/2024 Abnormal Stain (C)  Preliminary     No results found for: \"URINECX\"  No results found for: \"WOUNDCX\"  No results found for: \"STOOLCX\"    I have personally looked at the labs and they are summarized above.  ----------------------------------------------------------------------------------------------------------------------  Imaging Results (Last 24 Hours)       Procedure Component Value Units Date/Time    CT Abdomen Pelvis With Contrast [[id number]] " Collected: 01/22/24 1720     Updated: 01/22/24 1726    Narrative:      EXAM:    CT Abdomen and Pelvis With Intravenous Contrast     EXAM DATE:    1/22/2024 5:05 PM     CLINICAL HISTORY:    abd pain/sepsis     TECHNIQUE:    Axial computed tomography images of the abdomen and pelvis with  intravenous contrast.  Sagittal and coronal reformatted images were  created and reviewed.  This CT exam was performed using one or more of  the following dose reduction techniques:  automated exposure control,  adjustment of the mA and/or kV according to patient size, and/or use of  iterative reconstruction technique.     COMPARISON:    No relevant prior studies available.     FINDINGS:    Lung bases:  Dependent bibasilar atelectasis. Lung bases otherwise  clear.    Heart:  Mild coronary artery calcifications.      ABDOMEN:    Liver:  Unremarkable as visualized.  No mass.    Gallbladder and bile ducts:  Cholecystectomy.  No ductal dilation.    Pancreas:  Mild fatty infiltration of the pancreas.  No ductal  dilation.    Spleen:  Unremarkable as visualized.  No splenomegaly.    Adrenals:  Unremarkable as visualized.  No mass.    Kidneys and ureters:  There are bilateral renal parapelvic cysts but  no evidence of hydronephrosis.  No renal or ureteral stones.  No  obstructive uropathy.    Stomach and bowel:  Stomach is incompletely distended.  Diffuse  colonic diverticulosis without evidence of acute diverticulitis.      PELVIS:    Appendix:  The appendix is not clearly identified.    Bladder:  Distended urinary bladder without wall thickening.    Reproductive:  Hysterectomy noted.      ABDOMEN and PELVIS:    Intraperitoneal space:  Unremarkable as visualized.  No significant  fluid collection.  No pneumoperitoneum.    Bones/joints:  Degenerative changes of the lumbar spine with mild  multilevel stenosis but no acute bony findings identified.  Arthritic  changes of the bilateral hip joints.  No dislocation.    Soft tissues:   Unremarkable as visualized.    Vasculature:  Advanced calcified atherosclerosis without evidence of  aneurysm.    Lymph nodes:  Unremarkable as visualized.  No enlarged lymph nodes.       Impression:      1.  No acute findings identified within abdomen or pelvis.  2.  Urinary bladder distention without wall thickening.  3.  Prior cholecystectomy and hysterectomy.  4.  Bilateral renal parapelvic cysts. No obstructive uropathy.  5.  Colonic diverticulosis without diverticulitis.  6.  Moderate constipation. No bowel obstruction.  7.  Bibasilar airspace disease most consistent with atelectasis.  8.  Other incidental/nonacute findings above.        This report was finalized on 1/22/2024 5:24 PM by Dr. Fabrizio Fair MD.       CT Head Without Contrast [[id number]] Collected: 01/22/24 1718     Updated: 01/22/24 1722    Narrative:      EXAM:    CT Head Without Intravenous Contrast     EXAM DATE:    1/22/2024 5:05 PM     CLINICAL HISTORY:    headache     TECHNIQUE:    Axial computed tomography images of the head/brain without intravenous  contrast.  Sagittal and coronal reformatted images were created and  reviewed.  This CT exam was performed using one or more of the following  dose reduction techniques:  automated exposure control, adjustment of  the mA and/or kV according to patient size, and/or use of iterative  reconstruction technique.     COMPARISON:    No relevant prior studies available.     FINDINGS:    Brain and extra-axial spaces:  Unremarkable as visualized.  No  hemorrhage.  No significant white matter disease.  No edema.  No  ventriculomegaly. Mild age-related atrophy noted.    Bones/joints:  Unremarkable as visualized.  No acute fracture.    Soft tissues:  Unremarkable as visualized.    Sinuses:  Unremarkable as visualized.  No acute sinusitis.    Mastoid air cells:  Unremarkable as visualized.  No mastoid effusion.       Impression:        Unremarkable exam demonstrating no CT evidence of acute  intracranial  findings.        This report was finalized on 1/22/2024 5:20 PM by Dr. Fabirzio Fair MD.       XR Chest 1 View [[id number]] Collected: 01/22/24 1615     Updated: 01/22/24 1617    Narrative:      PROCEDURE: XR CHEST 1 VW-       HISTORY: Cough, vomiting     COMPARISON: None.     FINDINGS: The heart is normal in size. The mediastinum is unremarkable.  There are low lung volumes with a left basilar opacity either  atelectasis or pneumonia. There are no effusions. There is no  pneumothorax. There are no acute osseous abnormalities.       Impression:      Low lung volumes with left basilar atelectasis versus  pneumonia.        This report was finalized on 1/22/2024 4:15 PM by David Ch M.D..             ----------------------------------------------------------------------------------------------------------------------  Assessment and Plan:    Aspiration pneumonia/pneumonitis -will transition antibiotic therapy to Zosyn.  Did discuss patient's listed penicillin allergy.  Patient reports having taken both amoxicillin and Augmentin in the past with no allergic reaction.    2.  Dementia -there were concerns of possible underlying dementia during initial exam.  Psychiatry services were consulted who thoroughly evaluated the patient.  After thorough evaluation, it is currently felt patient does not have appropriate decision-making capacity.    3.  Tremors -initially there was some concern of tremors/seizure-like activity.  Cerebel was obtained which demonstrated 0% seizure burden.  Etiology is unclear but possibly related to rigors.  Preliminary blood cultures demonstrate group A strep pyogenes.  Sensitivities currently pending, will consult infectious disease for recommendations regarding group A strep bacteremia.  Will transition current antibiotic therapy to vancomycin and Zosyn.    4.  Strep pyogenes bacteremia -will transition current antibiotic therapy to vancomycin and Zosyn.  Patient does not  clinically appear to have symptoms consistent with toxic shock syndrome, will defer clindamycin at this time due to national shortage and lack of supporting clinical picture.  Will consult infectious disease for further recommendations.        Disposition will likely require several more days hospitalization    Eulalio Clarke DO   01/23/24 14:22 EST

## 2024-01-24 LAB
ANION GAP SERPL CALCULATED.3IONS-SCNC: 4.5 MMOL/L (ref 5–15)
BUN SERPL-MCNC: 15 MG/DL (ref 8–23)
BUN/CREAT SERPL: 17.4 (ref 7–25)
CALCIUM SPEC-SCNC: 8.2 MG/DL (ref 8.6–10.5)
CHLORIDE SERPL-SCNC: 103 MMOL/L (ref 98–107)
CO2 SERPL-SCNC: 25.5 MMOL/L (ref 22–29)
CREAT SERPL-MCNC: 0.86 MG/DL (ref 0.57–1)
DEPRECATED RDW RBC AUTO: 47.4 FL (ref 37–54)
EGFRCR SERPLBLD CKD-EPI 2021: 67.5 ML/MIN/1.73
ERYTHROCYTE [DISTWIDTH] IN BLOOD BY AUTOMATED COUNT: 14.6 % (ref 12.3–15.4)
GLUCOSE BLDC GLUCOMTR-MCNC: 137 MG/DL (ref 70–130)
GLUCOSE BLDC GLUCOMTR-MCNC: 155 MG/DL (ref 70–130)
GLUCOSE BLDC GLUCOMTR-MCNC: 209 MG/DL (ref 70–130)
GLUCOSE BLDC GLUCOMTR-MCNC: 80 MG/DL (ref 70–130)
GLUCOSE SERPL-MCNC: 91 MG/DL (ref 65–99)
HCT VFR BLD AUTO: 27.9 % (ref 34–46.6)
HGB BLD-MCNC: 9.3 G/DL (ref 12–15.9)
MCH RBC QN AUTO: 29.4 PG (ref 26.6–33)
MCHC RBC AUTO-ENTMCNC: 33.3 G/DL (ref 31.5–35.7)
MCV RBC AUTO: 88.3 FL (ref 79–97)
PLATELET # BLD AUTO: 298 10*3/MM3 (ref 140–450)
PMV BLD AUTO: 10.2 FL (ref 6–12)
POTASSIUM SERPL-SCNC: 3.4 MMOL/L (ref 3.5–5.2)
RBC # BLD AUTO: 3.16 10*6/MM3 (ref 3.77–5.28)
SODIUM SERPL-SCNC: 133 MMOL/L (ref 136–145)
VANCOMYCIN SERPL-MCNC: 8.8 MCG/ML (ref 5–40)
WBC NRBC COR # BLD AUTO: 13.24 10*3/MM3 (ref 3.4–10.8)

## 2024-01-24 PROCEDURE — 63710000001 INSULIN LISPRO (HUMAN) PER 5 UNITS: Performed by: INTERNAL MEDICINE

## 2024-01-24 PROCEDURE — 97110 THERAPEUTIC EXERCISES: CPT

## 2024-01-24 PROCEDURE — 82948 REAGENT STRIP/BLOOD GLUCOSE: CPT

## 2024-01-24 PROCEDURE — 99232 SBSQ HOSP IP/OBS MODERATE 35: CPT | Performed by: INTERNAL MEDICINE

## 2024-01-24 PROCEDURE — 80202 ASSAY OF VANCOMYCIN: CPT | Performed by: INTERNAL MEDICINE

## 2024-01-24 PROCEDURE — 97166 OT EVAL MOD COMPLEX 45 MIN: CPT

## 2024-01-24 PROCEDURE — 80048 BASIC METABOLIC PNL TOTAL CA: CPT | Performed by: INTERNAL MEDICINE

## 2024-01-24 PROCEDURE — 25810000003 SODIUM CHLORIDE 0.9 % SOLUTION: Performed by: INTERNAL MEDICINE

## 2024-01-24 PROCEDURE — 85027 COMPLETE CBC AUTOMATED: CPT | Performed by: INTERNAL MEDICINE

## 2024-01-24 PROCEDURE — 99222 1ST HOSP IP/OBS MODERATE 55: CPT | Performed by: INTERNAL MEDICINE

## 2024-01-24 PROCEDURE — 25010000002 CEFTRIAXONE PER 250 MG: Performed by: INTERNAL MEDICINE

## 2024-01-24 PROCEDURE — 25010000002 PIPERACILLIN SOD-TAZOBACTAM PER 1 G: Performed by: INTERNAL MEDICINE

## 2024-01-24 PROCEDURE — 25010000002 HEPARIN (PORCINE) PER 1000 UNITS: Performed by: INTERNAL MEDICINE

## 2024-01-24 RX ADMIN — INSULIN LISPRO 3 UNITS: 100 INJECTION, SOLUTION INTRAVENOUS; SUBCUTANEOUS at 20:44

## 2024-01-24 RX ADMIN — SODIUM CHLORIDE 100 ML/HR: 9 INJECTION, SOLUTION INTRAVENOUS at 08:14

## 2024-01-24 RX ADMIN — INSULIN LISPRO 2 UNITS: 100 INJECTION, SOLUTION INTRAVENOUS; SUBCUTANEOUS at 16:49

## 2024-01-24 RX ADMIN — GABAPENTIN 400 MG: 400 CAPSULE ORAL at 20:36

## 2024-01-24 RX ADMIN — SODIUM CHLORIDE 2000 MG: 9 INJECTION, SOLUTION INTRAVENOUS at 12:34

## 2024-01-24 RX ADMIN — Medication 10 ML: at 08:15

## 2024-01-24 RX ADMIN — Medication 10 ML: at 20:36

## 2024-01-24 RX ADMIN — PIPERACILLIN SODIUM AND TAZOBACTAM SODIUM 3.38 G: 3; .375 INJECTION, POWDER, LYOPHILIZED, FOR SOLUTION INTRAVENOUS at 05:24

## 2024-01-24 RX ADMIN — LORAZEPAM 2 MG: 2 TABLET ORAL at 20:36

## 2024-01-24 RX ADMIN — SODIUM CHLORIDE 100 ML/HR: 9 INJECTION, SOLUTION INTRAVENOUS at 17:58

## 2024-01-24 NOTE — NURSING NOTE
Pt's family at bedside and aware that pt is being moved to [facility].

## 2024-01-24 NOTE — THERAPY TREATMENT NOTE
Acute Care - Physical Therapy Treatment Note   [facility]     Patient Name: Leonie Laws  : 1940  MRN: [id number]  Today's Date: 2024   Onset of Illness/Injury or Date of Surgery: 24 (admission date)  Visit Dx:     ICD-10-CM ICD-9-CM   1. Nausea and vomiting, unspecified vomiting type  R11.2 787.01   2. Dehydration  E86.0 276.51   3. Debility  R53.81 799.3     Patient Active Problem List   Diagnosis    Cystocele    Diverticulosis of large intestine    Hemorrhoids    Hyperlipidemia    Postoperative hypothyroidism    Impaired fasting glucose    Osteoporosis    Rectocele    Trigeminal neuralgia    Anxiety    Vomiting     Past Medical History:   Diagnosis Date    Anxiety     Hyperlipidemia     Trigeminal neuralgia      Past Surgical History:   Procedure Laterality Date    BREAST SURGERY      HYSTERECTOMY       PT Assessment (last 12 hours)       PT Evaluation and Treatment       Row Name 24 1001          Physical Therapy Time and Intention    Document Type therapy note (daily note)  -     Mode of Treatment physical therapy  -     Patient Effort good  -     Comment Pt seen for therapy treatment this date, worked on transfer, LE TE.  -       Row Name 24 1001          Bed Mobility    Bed Mobility sit-supine  -     Sit-Supine [place] (Bed Mobility) modified independence  -       Row Name 24 1001          Transfers    Transfers chair-bed transfer;sit-stand transfer;stand-sit transfer  -       Row Name 24 1001          Chair-Bed Transfer    Chair-Bed [place] (Transfers) standby assist;supervision;contact guard  -       Row Name 24 1001          Sit-Stand Transfer    Sit-Stand [place] (Transfers) contact guard;standby assist;supervision  -     Assistive Device (Sit-Stand Transfers) walker, front-wheeled  -       Row Name 24 1001          Stand-Sit Transfer    Stand-Sit [place] (Transfers) standby assist;supervision;contact guard  -      Assistive Device (Stand-Sit Transfers) walker, front-wheeled  -       Row Name 01/24/24 1001          Gait/Stairs (Locomotion)    Gait/Stairs Locomotion gait/ambulation assistive device  -     Patient was able to Ambulate yes  -     Distance in Feet (Gait) Pt ambulated several feet with OT, with PT from chair to bed grossly 4/5' with RW.  -     Comment, (Gait/Stairs) Pt demonstrates decreased ability to pick feet up for good clearance, somewhat shuffling/short stride length, potential forward flexed posture with AD use  -       Row Name 01/24/24 1001          Motor Skills    Therapeutic Exercise hip;knee  hip flexion, hip abd/add, S/LAQ grossly 2-3x10  -       Row Name 01/24/24 1001          Plan of Care Review    Outcome Evaluation Pt participated well with therapy this date, may benefit from continued therapeutic intervention.  -       Row Name 01/24/24 1001          Positioning and Restraints    Pre-Treatment Position sitting in chair/recliner  -     Post Treatment Position bed  -     In Bed supine;call light within reach;exit alarm on  -               User Key  (r) = Recorded By, (t) = Taken By, (c) = Cosigned By      Initials Name Provider Type     Carline Estrada, PT Physical Therapist                      PT Recommendation and Plan  Anticipated Discharge Disposition (PT): skilled nursing facility, extended care facility, home with 24/7 care, home with supervision, home with assist, home with home health  Planned Therapy Interventions (PT): bed mobility training, gait training, strengthening, transfer training (add interventions PRN, as appropriate, per pt needs)  Therapy Frequency (PT): 2 times/wk (2-5x/week)  Outcome Evaluation: Pt participated well with therapy this date, may benefit from continued therapeutic intervention.       Time Calculation:    PT Charges       Row Name 01/24/24 9287             Time Calculation    Start Time 1001  -KH      PT Received On 01/24/24  -          Time Calculation- PT    Total Timed Code Minutes- PT 15 minute(s)  -ELISE                User Key  (r) = Recorded By, (t) = Taken By, (c) = Cosigned By      Initials Name Provider Type    Carline Joyner, PT Physical Therapist                  Therapy Charges for Today       Code Description Service Date Service Provider Modifiers Qty    39088091906 HC PT EVAL MOD COMPLEXITY 4 1/23/2024 Carline Estrada, PT GP 1    44857630188 HC PT THER PROC EA 15 MIN 1/24/2024 Carline Estrada, PT GP 1            PT G-Codes  AM-PAC 6 Clicks Score (PT): 12    Carline Estrada, PT  1/24/2024

## 2024-01-24 NOTE — NURSING NOTE
Pt refused to getting a bath today. Pt asked multiple times via CNA. Pt educated on importance of bathing, but was still unwilling to take a bath. Pt was willing to have Vasileare completed this shift.

## 2024-01-24 NOTE — THERAPY EVALUATION
Patient Name: Leonie Laws  : 1940    MRN: [id number]                              Today's Date: 2024       Admit Date: 2024    Visit Dx:     ICD-10-CM ICD-9-CM   1. Nausea and vomiting, unspecified vomiting type  R11.2 787.01   2. Dehydration  E86.0 276.51   3. Debility  R53.81 799.3     Patient Active Problem List   Diagnosis    Cystocele    Diverticulosis of large intestine    Hemorrhoids    Hyperlipidemia    Postoperative hypothyroidism    Impaired fasting glucose    Osteoporosis    Rectocele    Trigeminal neuralgia    Anxiety    Vomiting     Past Medical History:   Diagnosis Date    Anxiety     Hyperlipidemia     Trigeminal neuralgia      Past Surgical History:   Procedure Laterality Date    BREAST SURGERY      HYSTERECTOMY        General Information       Row Name 24 1103          OT Time and Intention    Document Type evaluation  -LA     Mode of Treatment occupational therapy  -LA       Row Name 24 1103          General Information    Patient Profile Reviewed yes  -LA     Prior Level of Function independent:;ADL's  Patient reported being independent until approx 1 week prior to hospitalization. Patient has walker that she only used the week before admission  -LA     Existing Precautions/Restrictions fall  -LA     Barriers to Rehab none identified  -LA       Row Name 24 1103          Occupational Profile    Reason for Services/Referral (Occupational Profile) OT to assess for changes in level of independence/safety with ADLs.  -LA     Patient Goals (Occupational Profile) Return home with spouse  -LA       Row Name 24 1103          Living Environment    People in Home spouse  -LA       Row Name 24 1103          Cognition    Orientation Status (Cognition) oriented x 4  -LA       Row Name 24 1103          Safety Issues, Functional Mobility    Safety Issues Affecting Function (Mobility) insight into deficits/self-awareness  -LA     Impairments Affecting  Function (Mobility) endurance/activity tolerance;strength  -LA               User Key  (r) = Recorded By, (t) = Taken By, (c) = Cosigned By      Initials Name Provider Type    Elda Rey OT Occupational Therapist                     Mobility/ADL's       Row Name 01/24/24 1105          Bed Mobility    Bed Mobility supine-sit  -LA     Supine-Sit [place] (Bed Mobility) minimum assist (75% patient effort)  -LA     Assistive Device (Bed Mobility) head of bed elevated  -LA       Row Name 01/24/24 1105          Transfers    Transfers bed-chair transfer;sit-stand transfer;toilet transfer  -LA       Row Name 01/24/24 1105          Bed-Chair Transfer    Bed-Chair [place] (Transfers) minimum assist (75% patient effort);contact guard  -LA     Assistive Device (Bed-Chair Transfers) walker, front-wheeled  -LA       Row Name 01/24/24 1105          Sit-Stand Transfer    Sit-Stand [place] (Transfers) minimum assist (75% patient effort)  ModA sit to stand from bathroom commode  -LA       Row Name 01/24/24 1105          Toilet Transfer    Type (Toilet Transfer) stand pivot/stand step  -LA     [place] Level (Toilet Transfer) minimum assist (75% patient effort);moderate assist (50% patient effort)  Min-Mod due to fatigue from fx mobility to bathroom  -LA       Row Name 01/24/24 1105          Functional Mobility    Functional Mobility- Ind. Level minimum assist (75% patient effort)  -LA     Functional Mobility- Device walker, front-wheeled  -LA     Patient was able to Ambulate yes  -LA       Row Name 01/24/24 1105          Activities of Daily Living    BADL Assessment/Intervention bathing;lower body dressing;upper body dressing;grooming;feeding;toileting  -LA       Row Name 01/24/24 1105          Bathing Assessment/Intervention    [place] Level (Bathing) maximum assist (25% patient effort);moderate assist (50% patient effort)  -LA       Row Name 01/24/24 1105          Lower Body Dressing  Assessment/Training    [place] Level (Lower Body Dressing) maximum assist (25% patient effort)  -[facility] Name 01/24/24 1105          Upper Body Dressing Assessment/Training    [place] Level (Upper Body Dressing) minimum assist (75% patient effort)  -[facility] Name 01/24/24 1105          Grooming Assessment/Training    [place] Level (Grooming) minimum assist (75% patient effort)  -LA       Row Name 01/24/24 1105          Self-Feeding Assessment/Training    [place] Level (Feeding) set up  -LA       Row Name 01/24/24 1105          Toileting Assessment/Training    [place] Level (Toileting) moderate assist (50% patient effort)  -LA               User Key  (r) = Recorded By, (t) = Taken By, (c) = Cosigned By      Initials Name Provider Type    Elda Rey OT Occupational Therapist                   Obj/Interventions       [facility] Name 01/24/24 1107          Sensory Assessment (Somatosensory)    Sensory Assessment (Somatosensory) sensation intact  -LA       Row Name 01/24/24 1107          Vision Assessment/Intervention    Visual Impairment/Limitations WFL  -LA       Row Name 01/24/24 1107          Range of Motion Comprehensive    General Range of Motion no range of motion deficits identified  -LA     Comment, General Range of Motion BUE ROM grossly WFL  -LA       Row Name 01/24/24 1107          Strength Comprehensive (MMT)    General Manual Muscle Testing (MMT) Assessment upper extremity strength deficits identified  -LA     Comment, General Manual Muscle Testing (MMT) Assessment UE strength grossly 4-/5  -[facility] Name 01/24/24 1107          Motor Skills    Motor Skills coordination;functional endurance  -LA     Coordination WFL  -LA     Functional Endurance poor+  -LA               User Key  (r) = Recorded By, (t) = Taken By, (c) = Cosigned By      Initials Name Provider Type    Elda Rey OT Occupational Therapist                   Goals/Plan       Row Name 01/24/24 1111           Transfer Goal 1 (OT)    Activity/Assistive Device (Transfer Goal 1, OT) transfers, all  -LA     [place] Level/Cues Needed (Transfer Goal 1, OT) modified independence  -LA     Time Frame (Transfer Goal 1, OT) by discharge  -LA       Row Name 01/24/24 1111          Bathing Goal 1 (OT)    Activity/Device (Bathing Goal 1, OT) bathing skills, all  -LA     [place] Level/Cues Needed (Bathing Goal 1, OT) minimum assist (75% or more patient effort)  -LA     Time Frame (Bathing Goal 1, OT) by discharge  -LA       Row Name 01/24/24 1111          Dressing Goal 1 (OT)    Activity/Device (Dressing Goal 1, OT) dressing skills, all  -LA     [place]/Cues Needed (Dressing Goal 1, OT) modified independence  -LA     Time Frame (Dressing Goal 1, OT) by discharge  -LA       Row Name 01/24/24 1111          Problem Specific Goal 1 (OT)    Problem Specific Goal 1 (OT) Patient will demonstrate 10 minute fx activity tolerance to increase safety/independence with ADLs  -LA     Time Frame (Problem Specific Goal 1, OT) by discharge  -LA       Row Name 01/24/24 1111          Therapy Assessment/Plan (OT)    Planned Therapy Interventions (OT) activity tolerance training;adaptive equipment training;BADL retraining;patient/caregiver education/training;ROM/therapeutic exercise;strengthening exercise;transfer/mobility retraining  -LA               User Key  (r) = Recorded By, (t) = Taken By, (c) = Cosigned By      Initials Name Provider Type    Elda Rey OT Occupational Therapist                   Clinical Impression       Row Name 01/24/24 1108          Plan of Care Review    Plan of Care Reviewed With patient;spouse  -LA     Outcome Evaluation OT evaluation completed this date. Patient with significant weakness resulting in decreased safety and independence with ADLs. Patient would benefit from OT services to address deficits and maximize independence prior to discharge. Patient verbalizes strong desire to return home  at discharge however patient would benefit from therapy services prior to discharge as patient spouse with recent surgery and injury from fall.  -LA       Row Name 01/24/24 1108          Therapy Assessment/Plan (OT)    Patient/Family Therapy Goal Statement (OT) Return home  -LA     Rehab Potential (OT) good, to achieve stated therapy goals  -LA     Criteria for Skilled Therapeutic Interventions Met (OT) meets criteria;yes;skilled treatment is necessary  -LA     Therapy Frequency (OT) --  PRN to follow for changes/progress toward goals.  -LA       Row Name 01/24/24 1108          Therapy Plan Review/Discharge Plan (OT)    Equipment Needs Upon Discharge (OT) --  TBD  -LA     Anticipated Discharge Disposition (OT) --  TBD; family all in disagreement with d/c plans  -LA       Row Name 01/24/24 1108          Positioning and Restraints    Pre-Treatment Position in bed  -LA     Post Treatment Position chair  -LA     In Chair sitting;call light within reach;encouraged to call for assist;with family/caregiver;with PT  -LA               User Key  (r) = Recorded By, (t) = Taken By, (c) = Cosigned By      Initials Name Provider Type    Elda Rey, OT Occupational Therapist                   Outcome Measures       Row Name 01/24/24 0800 01/24/24 0700       How much help from another person do you currently need...    Turning from your back to your side while in flat bed without using bedrails? 2  -RG 2  -RG    Moving from lying on back to sitting on the side of a flat bed without bedrails? 2  -RG 2  -RG    Moving to and from a bed to a chair (including a wheelchair)? 2  -RG 2  -RG    Standing up from a chair using your arms (e.g., wheelchair, bedside chair)? 2  -RG 2  -RG    Climbing 3-5 steps with a railing? 2  -RG 2  -RG    To walk in hospital room? 2  -RG 2  -RG    AM-PAC 6 Clicks Score (PT) 12  -RG 12  -RG    Highest Level of Mobility Goal 4 --> Transfer to chair/commode  -RG 4 --> Transfer to chair/commode  -RG               User Key  (r) = Recorded By, (t) = Taken By, (c) = Cosigned By      Initials Name Provider Type    Nikhil Betts, RN Registered Nurse                      OT Recommendation and Plan  Planned Therapy Interventions (OT): activity tolerance training, adaptive equipment training, BADL retraining, patient/caregiver education/training, ROM/therapeutic exercise, strengthening exercise, transfer/mobility retraining  Therapy Frequency (OT):  (PRN to follow for changes/progress toward goals.)  Plan of Care Review  Plan of Care Reviewed With: patient, spouse  Outcome Evaluation: OT evaluation completed this date. Patient with significant weakness resulting in decreased safety and independence with ADLs. Patient would benefit from OT services to address deficits and maximize independence prior to discharge. Patient verbalizes strong desire to return home at discharge however patient would benefit from therapy services prior to discharge as patient spouse with recent surgery and injury from fall.     Time Calculation:          Therapy Charges for Today       Code Description Service Date Service Provider Modifiers Qty    [id number] HC OT EVAL MOD COMPLEXITY 4 1/24/2024 Elda Wellington OT GO 1                 Elda Wellington OT  1/24/2024

## 2024-01-24 NOTE — CASE MANAGEMENT/SOCIAL WORK
Discharge Planning Assessment   [place]     Patient Name: Leonie Laws  MRN: [id number]  Today's Date: 1/24/2024    Admit Date: 1/22/2024            Discharge Plan       Row Name 01/24/24 0922       Plan    Plan SS noted, Psychiatry has recommended that Pt does not have mental capacity and will need emergency guardianship appointed. SS attempted to call Pt's son, Giovanny [phone] whom is power of  but is not successful. SS to follow.    17:16pm: SS received return call from Pt's son Giovanny on this date. Per son, he believes he was appointed POA but does not have a copy of POA paperwork, nor knows where documents are. Son voices he does not feel he would be able to become a guardian due to him working out of state. Pt discussed with Dr. Clarke on this date. Per Dr. Clarke, Pt's spouse was present at bedside and has voiced that he has not been living at same residence as Pt for the past 8 weeks that Pt's primary care was supposed to be provided by son Yash and dtr in law. Pt discussed with  ABA. SS made report to Adult Protective Services with Central Intake [phone] (Intake ID#[id number]) SS to call back and confirm if report met for investigation on 01/25/24. SS to follow.                   Sia Regan, KATW

## 2024-01-24 NOTE — PROGRESS NOTES
[facility]     Patient Identification:  Name:  Leonie Laws  Age:  83 y.o.  Sex:  female  :  1940  MRN:  [id number]  Visit Number:  [id number]  Primary Care Provider:  Joe Bowden MD    Length of stay:  2    Chief complaint: None    Subjective:    Patient seen and examined at bedside with patient's  and one of her sons present.  Patient is much more awake and conversant today.  However, patient remains somewhat confused to person, place and time.  She does appear oriented to immediate surroundings but was unable to give the accurate date or current location.  She does not seem to understand her current medical condition or plan of care.  ----------------------------------------------------------------------------------------------------------------------  Current Hospital Meds:  atorvastatin, 20 mg, Oral, Daily  buPROPion XL, 150 mg, Oral, Daily  cefTRIAXone, 2,000 mg, Intravenous, Q24H  gabapentin, 400 mg, Oral, Nightly  heparin (porcine), 5,000 Units, Subcutaneous, Q12H  insulin lispro, 2-7 Units, Subcutaneous, 4x Daily AC & at Bedtime  LORazepam, 2 mg, Oral, Nightly  megestrol, 200 mg, Oral, TID  pantoprazole, 40 mg, Oral, Q AM  senna-docusate sodium, 2 tablet, Oral, BID  sodium chloride, 10 mL, Intravenous, Q12H      sodium chloride, 100 mL/hr, Last Rate: 100 mL/hr (24 0814)      ----------------------------------------------------------------------------------------------------------------------  Vital Signs:  Temp:  [98.1 °F (36.7 °C)-99.5 °F (37.5 °C)] 98.1 °F (36.7 °C)  Heart Rate:  [76-90] 78  Resp:  [16-18] 16  BP: (100-132)/(52-74) 100/52      24  1515 24  2315   Weight: 58.1 kg (128 lb) 56.6 kg (124 lb 12.8 oz)     Body mass index is 18.98 kg/m².    Intake/Output Summary (Last 24 hours) at 2024 1719  Last data filed at 2024 1300  Gross per 24 hour   Intake 2146.67 ml   Output 600 ml   Net 1546.67 ml     Diet:  Regular/House Diet; Feeding Assistance - Nursing; Texture: Soft to Chew (NDD 3); Soft to Chew: Chopped Meat; Fluid Consistency: Thin (IDDSI 0)  ----------------------------------------------------------------------------------------------------------------------  Physical exam:  Constitutional: Well-nourished  female in no apparent distress.     HENT:  Head:  Normocephalic and atraumatic.  Mouth:  Moist mucous membranes.    Eyes:  Conjunctivae and EOM are normal.  Pupils are equal, round, and reactive to light.  No scleral icterus.    Neck:  Neck supple. No thyromegaly.  No JVD present.    Cardiovascular:  Regular rate and rhythm with no murmurs, rubs, clicks or gallops appreciated.  Pulmonary/Chest:  Clear to auscultation bilaterally with no crackles, wheezes or rhonchi appreciated.  Abdominal:  Soft. Nontender. Nondistended  Bowel sounds are normal in all four quadrants. No organomegally appreciated.   Musculoskeletal:  No edema, no tenderness, and no deformity.  No red or swollen joints anywhere.    Neurological:  Alert, Cranial nerves II-XII intact with no focal deficits.  No facial droop.  No slurred speech.   Skin:  Warm and dry to palpation with no rashes or lesions appreciated.  Peripheral vascular:  2+ radial and pedal pulses in bilateral upper and lower extremities.  Psychiatric:  Alert and oriented to immediate surroundings, but is not oriented to person, place or time.  ------------------------------------------------------------------------------------------------------------  ----------------------------------------------------------------------------------------------------------------------  Results from last 7 days   Lab Units 01/22/24  1534   HSTROP T ng/L <6     Results from last 7 days   Lab Units 01/24/24  0449 01/23/24  0509 01/22/24  1930 01/22/24  1542 01/22/24  1534   CRP mg/dL  --   --   --   --  25.25*   LACTATE mmol/L  --   --  1.8 2.2*  --    WBC 10*3/mm3 13.24* 16.16*  --   --   "18.78*   HEMOGLOBIN g/dL 9.3* 9.3*  --   --  12.6   HEMATOCRIT % 27.9* 27.9*  --   --  38.4   MCV fL 88.3 87.7  --   --  90.1   MCHC g/dL 33.3 33.3  --   --  32.8   PLATELETS 10*3/mm3 298 340  --   --  399         Results from last 7 days   Lab Units 01/24/24  0449 01/23/24  0509 01/22/24  1534   SODIUM mmol/L 133* 130* 127*   POTASSIUM mmol/L 3.4* 3.9 4.6   MAGNESIUM mg/dL  --   --  2.0  2.0   CHLORIDE mmol/L 103 97* 89*   CO2 mmol/L 25.5 25.7 23.9   BUN mg/dL 15 17 16   CREATININE mg/dL 0.86 0.85 0.95   CALCIUM mg/dL 8.2* 8.7 9.7   GLUCOSE mg/dL 91 138* 233*   ALBUMIN g/dL  --  2.3* 2.7*   BILIRUBIN mg/dL  --  0.3 0.6   ALK PHOS U/L  --  140* 118*   AST (SGOT) U/L  --  36* 38*   ALT (SGPT) U/L  --  28 32   Estimated Creatinine Clearance: 45.1 mL/min (by C-G formula based on SCr of 0.86 mg/dL).    No results found for: \"AMMONIA\"      Blood Culture   Date Value Ref Range Status   01/22/2024 Abnormal Stain (C)  Preliminary   01/22/2024 Abnormal Stain (C)  Preliminary     No results found for: \"URINECX\"  No results found for: \"WOUNDCX\"  No results found for: \"STOOLCX\"    I have personally looked at the labs and they are summarized above.  ----------------------------------------------------------------------------------------------------------------------  Imaging Results (Last 24 Hours)       ** No results found for the last 24 hours. **          ----------------------------------------------------------------------------------------------------------------------  Assessment and Plan:    Aspiration pneumonia/pneumonitis - Appreciate ID recommendations, have de-escalated abx therapy to Rocephin and Vancomycin.     2.  Dementia - Patient does appear to have clinical exam findings concerning for underlying dementia.  Did discuss this finding with patient's  at bedside, who confirms he has had suspicion that patient has had dementia for several months. Patient's  did state the patient's son was previously " made medical POA.  Case management has reportedly contacted patient's son who states while this did occur, he has lost the documentation proving POA, and he also expressed concern that he would not be able to adequately serve as POA, as he is out of state working.     3.  Tremors - now resolved, initially there was some concern of tremors/seizure-like activity.  Cerebel was obtained which demonstrated 0% seizure burden.  Etiology was likely related to rigors.  2 of 2 blood cultures demonstrate group A strep pyogenes.  Sensitivities currently pending, but have changed abx therapy to Rocephin and Vancomycin. Appreciate ID recommendations.    4.  Strep pyogenes bacteremia - have transitioned antibiotic therapy to vancomycin and Rocephin.  Patient does not clinically appear to have symptoms consistent with toxic shock syndrome, will continue to defer clindamycin at this time.        Disposition will likely require several more days hospitalization    Eulalio Clarke,    01/24/24 17:19 EST

## 2024-01-24 NOTE — PLAN OF CARE
Goal Outcome Evaluation:      Pt resting in bed at this time. No s/s of distress noted. Intermittent confusion this shift. No complaints of pain or discomfort. PRN medication given for nausea. Pt refused to ambulate. Will continue POC.

## 2024-01-24 NOTE — PLAN OF CARE
Goal Outcome Evaluation:   Pt resting in bed at this time. Pt up to chair with PT on this shift. Pt unwilling to get up and ambulate any other time this shift. Pt voices no complaints or concerns and has had no acute changes at this time. Pt refused bath and was educated on importance, but was still unwilling to bathe. Pt moved to room 329. Pericare completed. Will continue with plan of care.

## 2024-01-24 NOTE — CONSULTS
INFECTIOUS DISEASE CONSULTATION REPORT        Patient Identification:  Name:  Leonie Laws  Age:  83 y.o.  Sex:  female  :  1940  MRN:  [id number]   Visit Number:  [id number]  Primary Care Physician:  Joe Bowden MD        Referring Provider:  Dr. Clarke    Reason for consult: Streptococcus pyogenes bacteremia       LOS: 2 days        Subjective       Subjective     History of present illness:      Thank you Dr. Clarke for allowing us to participate in the care of your patient.  As you well know, Ms. Leonie Laws is a 83 y.o. female with past medical history significant for anxiety, hyperlipidemia, trigeminal neuralgia, hypothyroidism, who presented to [facility] on 2024 for generalized weakness.  WBC improving at 13.24.  Respiratory panel PCR negative.  Lactic acid elevated at 2.2 on admission.  CRP elevated 25.25 on admission.  Blood cultures from 2024 2 out of 2 sets positive for Streptococcus pyogenes group A.  Chest x-ray from 2024 reports low lung volumes with left basilar atelectasis versus pneumonia.  CT of the head from 2024 unremarkable with no acute intracranial findings.  CT of the abdomen pelvis from 2024 reports no acute findings in the abdomen or pelvis, urinary bladder distention without wall thickening, colonic diverticulosis without diverticulitis, bibasilar airspace disease most consistent with atelectasis, moderate constipation, no bowel obstruction.          Infectious Disease consultation was requested for antimicrobial management.      ---------------------------------------------------------------------------------------------------------------------     Review Of Systems:    Unable to obtain due to dementia.    ---------------------------------------------------------------------------------------------------------------------     Past Medical History    Past Medical History:   Diagnosis Date   •  Anxiety    • Hyperlipidemia    • Trigeminal neuralgia        Past Surgical History    Past Surgical History:   Procedure Laterality Date   • BREAST SURGERY     • HYSTERECTOMY         Family History    Family History   Problem Relation Age of Onset   • Stroke Mother    • Heart disease Father        Social History    Social History     Tobacco Use   • Smoking status: Never   • Smokeless tobacco: Never   Vaping Use   • Vaping Use: Never used   Substance Use Topics   • Alcohol use: No   • Drug use: No       Allergies    Doxycycline, Gentamycin [gentamicin], Miconazole, Penicillins, and Sulfa antibiotics  ---------------------------------------------------------------------------------------------------------------------     Home Medications:    Prior to Admission Medications       Prescriptions Last Dose Informant Patient Reported? Taking?    atorvastatin (LIPITOR) 20 MG tablet Unknown Pharmacy Yes No    Take 1 tablet by mouth Daily.    buPROPion XL (WELLBUTRIN XL) 150 MG 24 hr tablet Unknown  Yes No    Take 1 tablet by mouth 3 times a day.    gabapentin (NEURONTIN) 400 MG capsule Unknown Pharmacy Yes No    Take 1 capsule by mouth Every Night.    LORazepam (ATIVAN) 2 MG tablet Unknown Pharmacy Yes No    Take 1 tablet by mouth Every Night.    meclizine (ANTIVERT) 25 MG tablet Unknown  Yes No    Take 1 tablet by mouth 3 (Three) Times a Day As Needed for Dizziness.    megestrol (MEGACE) 40 MG/ML suspension  Pharmacy Yes No    Take 5 mL by mouth 3 times a day.    omeprazole (priLOSEC) 20 MG capsule Unknown  Yes No    Take 1 capsule by mouth Daily.    ondansetron (ZOFRAN) 8 MG tablet Unknown  Yes No    Take 1 tablet by mouth Every 8 (Eight) Hours As Needed for Nausea or Vomiting.          ---------------------------------------------------------------------------------------------------------------------    Objective       Objective     Hospital Scheduled Meds:  atorvastatin, 20 mg, Oral, Daily  buPROPion XL, 150 mg, Oral,  Daily  gabapentin, 400 mg, Oral, Nightly  heparin (porcine), 5,000 Units, Subcutaneous, Q12H  insulin lispro, 2-7 Units, Subcutaneous, 4x Daily AC & at Bedtime  LORazepam, 2 mg, Oral, Nightly  megestrol, 200 mg, Oral, TID  pantoprazole, 40 mg, Oral, Q AM  piperacillin-tazobactam, 3.375 g, Intravenous, Q8H  senna-docusate sodium, 2 tablet, Oral, BID  sodium chloride, 10 mL, Intravenous, Q12H  vancomycin, 1,000 mg, Intravenous, Q24H      sodium chloride, 100 mL/hr, Last Rate: 100 mL/hr (01/23/24 2333)      ---------------------------------------------------------------------------------------------------------------------   Vital Signs:  Temp:  [98.6 °F (37 °C)-99.5 °F (37.5 °C)] 99.1 °F (37.3 °C)  Heart Rate:  [76-88] 78  Resp:  [16] 16  BP: ()/(54-74) 105/54  Mean Arterial Pressure (Non-Invasive) for the past 24 hrs (Last 3 readings):   Noninvasive MAP (mmHg)   01/23/24 1400 78   01/23/24 1000 75     SpO2 Percentage    01/22/24 2253 01/23/24 0600 01/23/24 1000   SpO2: 98% 95% 96%     SpO2:  [96 %] 96 %  on   ;   Device (Oxygen Therapy): room air    Body mass index is 18.98 kg/m².  Wt Readings from Last 3 Encounters:   01/22/24 56.6 kg (124 lb 12.8 oz)   06/25/20 66.5 kg (146 lb 9.6 oz)   05/04/20 70.1 kg (154 lb 9.6 oz)     ---------------------------------------------------------------------------------------------------------------------     Physical Exam:    Constitutional: Elderly, chronically ill-appearing.  Currently on room air with no apparent distress.  HENT:  Head: Normocephalic and atraumatic.  Mouth:  Moist mucous membranes.    Eyes:  Conjunctivae and EOM are normal.  No scleral icterus.  Neck:  Neck supple.  No JVD present.    Cardiovascular:  Normal rate, regular rhythm and normal heart sounds with no murmur. No edema.  Pulmonary/Chest:  No respiratory distress, no wheezes, no crackles, with normal breath sounds and good air movement.  Abdominal:  Soft.  Bowel sounds are normal.  No distension  "and no tenderness.  Chase catheter in place.  Musculoskeletal:  No edema, no tenderness, and no deformity.  No swelling or redness of joints.  Neurological: Confused secondary to dementia.  Skin:  Skin is warm and dry.  No rash noted.  No pallor.   Psychiatric:  Normal mood and affect.  Behavior is normal.    ---------------------------------------------------------------------------------------------------------------------    Results from last 7 days   Lab Units 01/22/24  1534   HSTROP T ng/L <6           Results from last 7 days   Lab Units 01/24/24 0449 01/23/24  0509 01/22/24  1930 01/22/24  1542 01/22/24  1534   CRP mg/dL  --   --   --   --  25.25*   LACTATE mmol/L  --   --  1.8 2.2*  --    WBC 10*3/mm3 13.24* 16.16*  --   --  18.78*   HEMOGLOBIN g/dL 9.3* 9.3*  --   --  12.6   HEMATOCRIT % 27.9* 27.9*  --   --  38.4   MCV fL 88.3 87.7  --   --  90.1   MCHC g/dL 33.3 33.3  --   --  32.8   PLATELETS 10*3/mm3 298 340  --   --  399     Results from last 7 days   Lab Units 01/24/24 0449 01/23/24  0509 01/22/24  1534   SODIUM mmol/L 133* 130* 127*   POTASSIUM mmol/L 3.4* 3.9 4.6   MAGNESIUM mg/dL  --   --  2.0  2.0   CHLORIDE mmol/L 103 97* 89*   CO2 mmol/L 25.5 25.7 23.9   BUN mg/dL 15 17 16   CREATININE mg/dL 0.86 0.85 0.95   CALCIUM mg/dL 8.2* 8.7 9.7   GLUCOSE mg/dL 91 138* 233*   ALBUMIN g/dL  --  2.3* 2.7*   BILIRUBIN mg/dL  --  0.3 0.6   ALK PHOS U/L  --  140* 118*   AST (SGOT) U/L  --  36* 38*   ALT (SGPT) U/L  --  28 32   Estimated Creatinine Clearance: 45.1 mL/min (by C-G formula based on SCr of 0.86 mg/dL).  No results found for: \"AMMONIA\"    Hemoglobin A1C   Date/Time Value Ref Range Status   01/22/2024 1534 5.90 (H) 4.80 - 5.60 % Final     Glucose   Date/Time Value Ref Range Status   01/24/2024 0615 80 70 - 130 mg/dL Final   01/23/2024 2203 114 70 - 130 mg/dL Final   01/23/2024 1654 98 70 - 130 mg/dL Final   01/23/2024 1053 98 70 - 130 mg/dL Final   01/23/2024 0718 118 70 - 130 mg/dL Final " "  01/22/2024 1506 224 (H) 70 - 130 mg/dL Final     Lab Results   Component Value Date    HGBA1C 5.90 (H) 01/22/2024     Lab Results   Component Value Date    TSH 0.897 01/22/2024    FREET4 1.46 12/22/2016       Blood Culture   Date Value Ref Range Status   01/22/2024 Gram Positive Cocci (C)  Preliminary   01/22/2024 Gram Positive Cocci (C)  Preliminary     No results found for: \"URINECX\"  No results found for: \"WOUNDCX\"  No results found for: \"STOOLCX\"  No results found for: \"RESPCX\"  Pain Management Panel           No data to display              I have personally reviewed the above laboratory results.   ---------------------------------------------------------------------------------------------------------------------  Imaging Results (Last 7 Days)       Procedure Component Value Units Date/Time    CT Abdomen Pelvis With Contrast [[id number]] Collected: 01/22/24 1720     Updated: 01/22/24 1726    Narrative:      EXAM:    CT Abdomen and Pelvis With Intravenous Contrast     EXAM DATE:    1/22/2024 5:05 PM     CLINICAL HISTORY:    abd pain/sepsis     TECHNIQUE:    Axial computed tomography images of the abdomen and pelvis with  intravenous contrast.  Sagittal and coronal reformatted images were  created and reviewed.  This CT exam was performed using one or more of  the following dose reduction techniques:  automated exposure control,  adjustment of the mA and/or kV according to patient size, and/or use of  iterative reconstruction technique.     COMPARISON:    No relevant prior studies available.     FINDINGS:    Lung bases:  Dependent bibasilar atelectasis. Lung bases otherwise  clear.    Heart:  Mild coronary artery calcifications.      ABDOMEN:    Liver:  Unremarkable as visualized.  No mass.    Gallbladder and bile ducts:  Cholecystectomy.  No ductal dilation.    Pancreas:  Mild fatty infiltration of the pancreas.  No ductal  dilation.    Spleen:  Unremarkable as visualized.  No splenomegaly.    Adrenals:  " Unremarkable as visualized.  No mass.    Kidneys and ureters:  There are bilateral renal parapelvic cysts but  no evidence of hydronephrosis.  No renal or ureteral stones.  No  obstructive uropathy.    Stomach and bowel:  Stomach is incompletely distended.  Diffuse  colonic diverticulosis without evidence of acute diverticulitis.      PELVIS:    Appendix:  The appendix is not clearly identified.    Bladder:  Distended urinary bladder without wall thickening.    Reproductive:  Hysterectomy noted.      ABDOMEN and PELVIS:    Intraperitoneal space:  Unremarkable as visualized.  No significant  fluid collection.  No pneumoperitoneum.    Bones/joints:  Degenerative changes of the lumbar spine with mild  multilevel stenosis but no acute bony findings identified.  Arthritic  changes of the bilateral hip joints.  No dislocation.    Soft tissues:  Unremarkable as visualized.    Vasculature:  Advanced calcified atherosclerosis without evidence of  aneurysm.    Lymph nodes:  Unremarkable as visualized.  No enlarged lymph nodes.       Impression:      1.  No acute findings identified within abdomen or pelvis.  2.  Urinary bladder distention without wall thickening.  3.  Prior cholecystectomy and hysterectomy.  4.  Bilateral renal parapelvic cysts. No obstructive uropathy.  5.  Colonic diverticulosis without diverticulitis.  6.  Moderate constipation. No bowel obstruction.  7.  Bibasilar airspace disease most consistent with atelectasis.  8.  Other incidental/nonacute findings above.        This report was finalized on 1/22/2024 5:24 PM by Dr. Fabrizio Fair MD.       CT Head Without Contrast [[id number]] Collected: 01/22/24 1718     Updated: 01/22/24 1722    Narrative:      EXAM:    CT Head Without Intravenous Contrast     EXAM DATE:    1/22/2024 5:05 PM     CLINICAL HISTORY:    headache     TECHNIQUE:    Axial computed tomography images of the head/brain without intravenous  contrast.  Sagittal and coronal reformatted images  were created and  reviewed.  This CT exam was performed using one or more of the following  dose reduction techniques:  automated exposure control, adjustment of  the mA and/or kV according to patient size, and/or use of iterative  reconstruction technique.     COMPARISON:    No relevant prior studies available.     FINDINGS:    Brain and extra-axial spaces:  Unremarkable as visualized.  No  hemorrhage.  No significant white matter disease.  No edema.  No  ventriculomegaly. Mild age-related atrophy noted.    Bones/joints:  Unremarkable as visualized.  No acute fracture.    Soft tissues:  Unremarkable as visualized.    Sinuses:  Unremarkable as visualized.  No acute sinusitis.    Mastoid air cells:  Unremarkable as visualized.  No mastoid effusion.       Impression:        Unremarkable exam demonstrating no CT evidence of acute intracranial  findings.        This report was finalized on 1/22/2024 5:20 PM by Dr. Fabrizio Fair MD.       XR Chest 1 View [[id number]] Collected: 01/22/24 1615     Updated: 01/22/24 1617    Narrative:      PROCEDURE: XR CHEST 1 VW-       HISTORY: Cough, vomiting     COMPARISON: None.     FINDINGS: The heart is normal in size. The mediastinum is unremarkable.  There are low lung volumes with a left basilar opacity either  atelectasis or pneumonia. There are no effusions. There is no  pneumothorax. There are no acute osseous abnormalities.       Impression:      Low lung volumes with left basilar atelectasis versus  pneumonia.        This report was finalized on 1/22/2024 4:15 PM by David Ch M.D..             I have personally reviewed the above radiology results.   ---------------------------------------------------------------------------------------------------------------------      Pertinent Infectious Disease Results      Assessment & Plan      Assessment      Severe sepsis with lactic acid greater than 2 on admission  Streptococcus pyogenes bacteremia      Plan      I saw  and examined the patient myself this morning with LEONARDO Guerra and discussed the findings and plan of care with her and got report from primary RN and here are my findings:    On 1/22/2024, the patient sought care at [facility] due to generalized weakness. Initial assessments revealed a WBC count of 13.24, showing improvement. The respiratory panel PCR test returned negative results. Upon admission, the patient exhibited elevated lactic acid at 2.2 and a CRP level of 25.25.    Blood cultures conducted on 1/22/2024 yielded positive results for Streptococcus pyogenes group A, with both sets showing the presence of the bacteria. A chest x-ray from the same date indicated low lung volumes, with findings suggestive of left basilar atelectasis or possible pneumonia.    A CT scan of the head performed on 1/22/2024 showed unremarkable results, with no acute intracranial findings. Additionally, a CT scan of the abdomen and pelvis conducted on the same date reported no acute abnormalities in the abdomen or pelvis. The findings included urinary bladder distention without wall thickening, colonic diverticulosis without diverticulitis, bibasilar airspace disease consistent with atelectasis, moderate constipation, and no signs of bowel obstruction.    Clinically patient does not show significant clinical evidence suggestive of pneumonia but she definitely is at risk for aspiration.  For now we will transition her antibiotic coverage to high-dose ceftriaxone to cover for group A Streptococcus pyogenes bacteremia since IV penicillin is going to be more complicated from an infusion therapy standpoint.  I agree with Dr. Clarke that there will be no indication for clindamycin at this time nor high-dose penicillin.    ANTIMICROBIAL THERAPY    piperacillin-tazobactam (ZOSYN) IVPB 3.375 g in 100 mL NS VTB  vancomycin (VANCOCIN) 1000mg in NS 250ml (CD)       Again, thank you Dr. Clarke for allowing  us to participate in the care of your patient and please feel free to call for any questions you may have.        Code Status:     Code Status and Medical Interventions:   Ordered at: 01/22/24 2049     Medical Intervention Limits:    NO intubation (DNI)     Code Status (Patient has no pulse and is not breathing):    No CPR (Do Not Attempt to Resuscitate)     Medical Interventions (Patient has pulse or is breathing):    Limited Support         LEONARDO Guerra  01/24/24  07:39 EST    Electronically signed by LEONARDO Guerra, 01/24/24, 7:47 AM EST.    Electronically signed by Jyothi King MD, 01/24/24, 8:00 AM EST.

## 2024-01-25 LAB
ANION GAP SERPL CALCULATED.3IONS-SCNC: 9.2 MMOL/L (ref 5–15)
BACTERIA SPEC AEROBE CULT: ABNORMAL
BUN SERPL-MCNC: 9 MG/DL (ref 8–23)
BUN/CREAT SERPL: 12.9 (ref 7–25)
CALCIUM SPEC-SCNC: 7.9 MG/DL (ref 8.6–10.5)
CHLORIDE SERPL-SCNC: 101 MMOL/L (ref 98–107)
CO2 SERPL-SCNC: 22.8 MMOL/L (ref 22–29)
CREAT SERPL-MCNC: 0.7 MG/DL (ref 0.57–1)
DEPRECATED RDW RBC AUTO: 48.3 FL (ref 37–54)
EGFRCR SERPLBLD CKD-EPI 2021: 86.5 ML/MIN/1.73
ERYTHROCYTE [DISTWIDTH] IN BLOOD BY AUTOMATED COUNT: 15 % (ref 12.3–15.4)
GLUCOSE BLDC GLUCOMTR-MCNC: 107 MG/DL (ref 70–130)
GLUCOSE BLDC GLUCOMTR-MCNC: 146 MG/DL (ref 70–130)
GLUCOSE BLDC GLUCOMTR-MCNC: 148 MG/DL (ref 70–130)
GLUCOSE BLDC GLUCOMTR-MCNC: 153 MG/DL (ref 70–130)
GLUCOSE SERPL-MCNC: 146 MG/DL (ref 65–99)
GRAM STN SPEC: ABNORMAL
GRAM STN SPEC: ABNORMAL
HCT VFR BLD AUTO: 28.6 % (ref 34–46.6)
HGB BLD-MCNC: 9.4 G/DL (ref 12–15.9)
ISOLATED FROM: ABNORMAL
MCH RBC QN AUTO: 29.1 PG (ref 26.6–33)
MCHC RBC AUTO-ENTMCNC: 32.9 G/DL (ref 31.5–35.7)
MCV RBC AUTO: 88.5 FL (ref 79–97)
PLATELET # BLD AUTO: 306 10*3/MM3 (ref 140–450)
PMV BLD AUTO: 10.9 FL (ref 6–12)
POTASSIUM SERPL-SCNC: 3.1 MMOL/L (ref 3.5–5.2)
RBC # BLD AUTO: 3.23 10*6/MM3 (ref 3.77–5.28)
SODIUM SERPL-SCNC: 133 MMOL/L (ref 136–145)
WBC NRBC COR # BLD AUTO: 12.17 10*3/MM3 (ref 3.4–10.8)

## 2024-01-25 PROCEDURE — 25810000003 SODIUM CHLORIDE 0.9 % SOLUTION: Performed by: INTERNAL MEDICINE

## 2024-01-25 PROCEDURE — 85027 COMPLETE CBC AUTOMATED: CPT | Performed by: INTERNAL MEDICINE

## 2024-01-25 PROCEDURE — 82948 REAGENT STRIP/BLOOD GLUCOSE: CPT

## 2024-01-25 PROCEDURE — 99232 SBSQ HOSP IP/OBS MODERATE 35: CPT | Performed by: NURSE PRACTITIONER

## 2024-01-25 PROCEDURE — 25010000002 HEPARIN (PORCINE) PER 1000 UNITS: Performed by: INTERNAL MEDICINE

## 2024-01-25 PROCEDURE — 87040 BLOOD CULTURE FOR BACTERIA: CPT | Performed by: NURSE PRACTITIONER

## 2024-01-25 PROCEDURE — 80048 BASIC METABOLIC PNL TOTAL CA: CPT | Performed by: INTERNAL MEDICINE

## 2024-01-25 PROCEDURE — 25010000002 ONDANSETRON PER 1 MG: Performed by: INTERNAL MEDICINE

## 2024-01-25 PROCEDURE — 99232 SBSQ HOSP IP/OBS MODERATE 35: CPT | Performed by: INTERNAL MEDICINE

## 2024-01-25 PROCEDURE — 25010000002 CEFTRIAXONE PER 250 MG: Performed by: INTERNAL MEDICINE

## 2024-01-25 RX ADMIN — SODIUM CHLORIDE 100 ML/HR: 9 INJECTION, SOLUTION INTRAVENOUS at 05:29

## 2024-01-25 RX ADMIN — GABAPENTIN 400 MG: 400 CAPSULE ORAL at 20:45

## 2024-01-25 RX ADMIN — HEPARIN SODIUM 5000 UNITS: 5000 INJECTION INTRAVENOUS; SUBCUTANEOUS at 20:46

## 2024-01-25 RX ADMIN — ACETAMINOPHEN 650 MG: 325 TABLET ORAL at 01:05

## 2024-01-25 RX ADMIN — ATORVASTATIN CALCIUM 20 MG: 20 TABLET, FILM COATED ORAL at 08:54

## 2024-01-25 RX ADMIN — ACETAMINOPHEN 650 MG: 325 TABLET ORAL at 23:03

## 2024-01-25 RX ADMIN — DOCUSATE SODIUM 50 MG AND SENNOSIDES 8.6 MG 2 TABLET: 8.6; 5 TABLET, FILM COATED ORAL at 08:54

## 2024-01-25 RX ADMIN — LORAZEPAM 2 MG: 2 TABLET ORAL at 20:45

## 2024-01-25 RX ADMIN — BUPROPION HYDROCHLORIDE 150 MG: 150 TABLET, EXTENDED RELEASE ORAL at 08:54

## 2024-01-25 RX ADMIN — Medication 10 ML: at 20:46

## 2024-01-25 RX ADMIN — MEGESTROL ACETATE 200 MG: 40 SUSPENSION ORAL at 14:41

## 2024-01-25 RX ADMIN — SODIUM CHLORIDE 100 ML/HR: 9 INJECTION, SOLUTION INTRAVENOUS at 20:45

## 2024-01-25 RX ADMIN — HEPARIN SODIUM 5000 UNITS: 5000 INJECTION INTRAVENOUS; SUBCUTANEOUS at 08:54

## 2024-01-25 RX ADMIN — MEGESTROL ACETATE 200 MG: 40 SUSPENSION ORAL at 08:54

## 2024-01-25 RX ADMIN — DOCUSATE SODIUM 50 MG AND SENNOSIDES 8.6 MG 2 TABLET: 8.6; 5 TABLET, FILM COATED ORAL at 20:46

## 2024-01-25 RX ADMIN — SODIUM CHLORIDE 2000 MG: 9 INJECTION, SOLUTION INTRAVENOUS at 12:28

## 2024-01-25 RX ADMIN — MEGESTROL ACETATE 200 MG: 40 SUSPENSION ORAL at 20:45

## 2024-01-25 RX ADMIN — ONDANSETRON 4 MG: 2 INJECTION INTRAMUSCULAR; INTRAVENOUS at 23:48

## 2024-01-25 NOTE — CASE MANAGEMENT/SOCIAL WORK
Discharge Planning Assessment  [facility]     Patient Name: Leonie Laws  MRN: [id number]  Today's Date: 1/25/2024    Admit Date: 1/22/2024    Plan: SS contacted [facility] Int[facility] [phone] and provided Intake ID #[id number] who states report met for investigation on this date and was assigned to Mis Sanders. SS spoke with Mis Sanders on this date who states she plans to do an onsite visit on this date. SS discussed pt on MD rounds and provided physician with an update. SS to follow.     Discharge Plan       Row Name 01/25/24 1434       Plan    Plan SS contacted [facility]ake [phone] and provided Intake ID #[id number] who states report met for investigation on this date and was assigned to Mis Sanders. SS spoke with Mis Sanders on this date who states she plans to do an onsite visit on this date. SS discussed pt on MD rounds and provided physician with an update. SS to follow.            CALI Fritz

## 2024-01-25 NOTE — PROGRESS NOTES
PROGRESS NOTE         Patient Identification:  Name:  Leonie Laws  Age:  83 y.o.  Sex:  female  :  1940  MRN:  [id number]  Visit Number:  [id number]  Primary Care Provider:  Joe Bowden MD         LOS: 3 days       ----------------------------------------------------------------------------------------------------------------------  Subjective       Chief Complaints:    Vomiting and Weakness - Generalized        Interval History:      The patient was awake and alert, eating breakfast this morning.  Denied any complaints or issues.  Afebrile.  Denies diarrhea.  Lung sounds clear to auscultation bilaterally.  Abdomen soft and nontender with normoactive bowel sounds.  Leukocytosis had improved to 12.17.    Review of Systems:    Constitutional: no fever, chills and night sweats.  Generalized fatigue.  Eyes: no eye drainage, itching or redness.  HEENT: no mouth sores, dysphagia or nose bleed.  Respiratory: no for shortness of breath, cough or production of sputum.  Cardiovascular: no chest pain, no palpitations, no orthopnea.  Gastrointestinal: no nausea, vomiting or diarrhea. No abdominal pain, hematemesis or rectal bleeding.  Genitourinary: no dysuria or polyuria.  Hematologic/lymphatic: no lymph node abnormalities, no easy bruising or easy bleeding.  Musculoskeletal: no muscle or joint pain.  Skin: No rash and no itching.  Neurological: no loss of consciousness, no seizure, no headache.  Behavioral/Psych: no depression or suicidal ideation.  Endocrine: no hot flashes.  Immunologic: negative.    ----------------------------------------------------------------------------------------------------------------------      Objective       [facility] Meds:  atorvastatin, 20 mg, Oral, Daily  buPROPion XL, 150 mg, Oral, Daily  cefTRIAXone, 2,000 mg, Intravenous, Q24H  gabapentin, 400 mg, Oral, Nightly  heparin (porcine), 5,000 Units, Subcutaneous, Q12H  insulin lispro, 2-7 Units,  Subcutaneous, 4x Daily AC & at Bedtime  LORazepam, 2 mg, Oral, Nightly  megestrol, 200 mg, Oral, TID  pantoprazole, 40 mg, Oral, Q AM  senna-docusate sodium, 2 tablet, Oral, BID  sodium chloride, 10 mL, Intravenous, Q12H      sodium chloride, 100 mL/hr, Last Rate: 100 mL/hr (01/25/24 0529)      ----------------------------------------------------------------------------------------------------------------------    Vital Signs:  Temp:  [97.5 °F (36.4 °C)-98.7 °F (37.1 °C)] 97.5 °F (36.4 °C)  Heart Rate:  [78-86] 81  Resp:  [16-20] 18  BP: (100-125)/(52-63) 125/60  No data found.  SpO2 Percentage    01/24/24 1900 01/24/24 2300 01/25/24 0300   SpO2: 98% 97% 98%     SpO2:  [97 %-98 %] 98 %  on   ;   Device (Oxygen Therapy): room air    Body mass index is 18.98 kg/m².  Wt Readings from Last 3 Encounters:   01/22/24 56.6 kg (124 lb 12.8 oz)   06/25/20 66.5 kg (146 lb 9.6 oz)   05/04/20 70.1 kg (154 lb 9.6 oz)        Intake/Output Summary (Last 24 hours) at 1/25/2024 1113  Last data filed at 1/25/2024 0500  Gross per 24 hour   Intake 240 ml   Output 750 ml   Net -510 ml     Diet: Regular/House Diet; Feeding Assistance - Nursing; Texture: Soft to Chew (NDD 3); Soft to Chew: Chopped Meat; Fluid Consistency: Thin (IDDSI 0)  ----------------------------------------------------------------------------------------------------------------------      Physical Exam:    Constitutional: Elderly and chronically ill-appearing.  Awake and alert, eating breakfast in bed.  On room air with no respiratory distress.      HENT:  Head: Normocephalic and atraumatic.  Mouth:  Moist mucous membranes.    Eyes:  Conjunctivae and EOM are normal.  No scleral icterus.  Neck:  Neck supple.  No JVD present.    Cardiovascular:  Normal rate, regular rhythm and normal heart sounds with no murmur. No edema.  Pulmonary/Chest:  No respiratory distress, no wheezes, no crackles, with normal breath sounds and good air movement.  Abdominal:  Soft.  Bowel sounds  "are normal.  No distension and no tenderness.   Musculoskeletal:  No edema, no tenderness, and no deformity.  No swelling or redness of joints.  Neurological:  Alert and oriented to person, place, and time.  No facial droop.  No slurred speech.   Skin:  Skin is warm and dry.  No rash noted.  No pallor.   Psychiatric:  Normal mood and affect.  Behavior is normal.        ----------------------------------------------------------------------------------------------------------------------  Results from last 7 days   Lab Units 01/22/24  1534   HSTROP T ng/L <6           Results from last 7 days   Lab Units 01/25/24 0140 01/24/24 0449 01/23/24 0509 01/22/24  1930 01/22/24  1542 01/22/24  1534   CRP mg/dL  --   --   --   --   --  25.25*   LACTATE mmol/L  --   --   --  1.8 2.2*  --    WBC 10*3/mm3 12.17* 13.24* 16.16*  --   --  18.78*   HEMOGLOBIN g/dL 9.4* 9.3* 9.3*  --   --  12.6   HEMATOCRIT % 28.6* 27.9* 27.9*  --   --  38.4   MCV fL 88.5 88.3 87.7  --   --  90.1   MCHC g/dL 32.9 33.3 33.3  --   --  32.8   PLATELETS 10*3/mm3 306 298 340  --   --  399     Results from last 7 days   Lab Units 01/25/24 0140 01/24/24 0449 01/23/24  0509 01/22/24  1534   SODIUM mmol/L 133* 133* 130* 127*   POTASSIUM mmol/L 3.1* 3.4* 3.9 4.6   MAGNESIUM mg/dL  --   --   --  2.0  2.0   CHLORIDE mmol/L 101 103 97* 89*   CO2 mmol/L 22.8 25.5 25.7 23.9   BUN mg/dL 9 15 17 16   CREATININE mg/dL 0.70 0.86 0.85 0.95   CALCIUM mg/dL 7.9* 8.2* 8.7 9.7   GLUCOSE mg/dL 146* 91 138* 233*   ALBUMIN g/dL  --   --  2.3* 2.7*   BILIRUBIN mg/dL  --   --  0.3 0.6   ALK PHOS U/L  --   --  140* 118*   AST (SGOT) U/L  --   --  36* 38*   ALT (SGPT) U/L  --   --  28 32   Estimated Creatinine Clearance: 55.4 mL/min (by C-G formula based on SCr of 0.7 mg/dL).  No results found for: \"AMMONIA\"    Hemoglobin A1C   Date/Time Value Ref Range Status   01/22/2024 1534 5.90 (H) 4.80 - 5.60 % Final     Glucose   Date/Time Value Ref Range Status   01/25/2024 1017 146 " "(H) 70 - 130 mg/dL Final   01/25/2024 0735 107 70 - 130 mg/dL Final   01/24/2024 1936 209 (H) 70 - 130 mg/dL Final   01/24/2024 1631 155 (H) 70 - 130 mg/dL Final   01/24/2024 1009 137 (H) 70 - 130 mg/dL Final   01/24/2024 0615 80 70 - 130 mg/dL Final   01/23/2024 2203 114 70 - 130 mg/dL Final   01/23/2024 1654 98 70 - 130 mg/dL Final     Lab Results   Component Value Date    HGBA1C 5.90 (H) 01/22/2024     Lab Results   Component Value Date    TSH 0.897 01/22/2024    FREET4 1.46 12/22/2016       Blood Culture   Date Value Ref Range Status   01/22/2024 Streptococcus pyogenes (Group A) (C)  Preliminary   01/22/2024 Streptococcus pyogenes (Group A) (C)  Final     No results found for: \"URINECX\"  No results found for: \"WOUNDCX\"  No results found for: \"STOOLCX\"  No results found for: \"RESPCX\"  Pain Management Panel           No data to display                  ----------------------------------------------------------------------------------------------------------------------  Imaging Results (Last 24 Hours)       ** No results found for the last 24 hours. **            ----------------------------------------------------------------------------------------------------------------------    Pertinent Infectious Disease Results                Assessment/Plan       Assessment     Severe sepsis with lactic acid greater than 2 on admission  Streptococcus pyogenes bacteremia        Plan      The patient was awake and alert, eating breakfast this morning.  Denied any complaints or issues.  Afebrile.  Denies diarrhea.  Lung sounds clear to auscultation bilaterally.  Abdomen soft and nontender with normoactive bowel sounds.  Leukocytosis had improved to 12.17.    Recommend to continue with high-dose ceftriaxone to cover for group A Streptococcus pyogenes bacteremia.  IV penicillin would be more complicated from an infusion therapy standpoint.  Will repeat blood cultures x 2 today.  We will continue to monitor " closely.      ANTIMICROBIAL THERAPY    cefTRIAXone (ROCEPHIN) 2000 mg IVPB in 100 mL NS (VTB)     Code Status:   Code Status and Medical Interventions:   Ordered at: 01/22/24 2049     Medical Intervention Limits:    NO intubation (DNI)     Code Status (Patient has no pulse and is not breathing):    No CPR (Do Not Attempt to Resuscitate)     Medical Interventions (Patient has pulse or is breathing):    Limited Support       Liana Snyder, LEONARDO  01/25/24  11:13 EST

## 2024-01-25 NOTE — SIGNIFICANT NOTE
01/25/24 1441   OTHER   Discipline physical therapy assistant   Rehab Time/Intention   Session Not Performed other (see comments)  (Pt refused in AM)

## 2024-01-25 NOTE — PLAN OF CARE
Goal Outcome Evaluation:            Pt resting in bed during assessment. VSS. Pt has no complaints or concerns at this time. No acute changes to note at this time. Will cont with POC

## 2024-01-25 NOTE — PLAN OF CARE
Goal Outcome Evaluation:                 Patient resting in bed at this time with family at bedside. Alert, disoriented x3, confused. Patient had bath this shift. Patient removed IV this morning, new IV started this shift 22g left wrist, tolerated well. Blood cultures were drawn this shift, awaiting results. No requests or complaints at this time. Will continue with POC.

## 2024-01-26 LAB
ANION GAP SERPL CALCULATED.3IONS-SCNC: 7.8 MMOL/L (ref 5–15)
BACTERIA SPEC AEROBE CULT: ABNORMAL
BUN SERPL-MCNC: 5 MG/DL (ref 8–23)
BUN/CREAT SERPL: 8.6 (ref 7–25)
CALCIUM SPEC-SCNC: 8.1 MG/DL (ref 8.6–10.5)
CHLORIDE SERPL-SCNC: 107 MMOL/L (ref 98–107)
CO2 SERPL-SCNC: 22.2 MMOL/L (ref 22–29)
CREAT SERPL-MCNC: 0.58 MG/DL (ref 0.57–1)
DEPRECATED RDW RBC AUTO: 54.3 FL (ref 37–54)
EGFRCR SERPLBLD CKD-EPI 2021: 90.5 ML/MIN/1.73
ERYTHROCYTE [DISTWIDTH] IN BLOOD BY AUTOMATED COUNT: 15.3 % (ref 12.3–15.4)
GLUCOSE BLDC GLUCOMTR-MCNC: 114 MG/DL (ref 70–130)
GLUCOSE BLDC GLUCOMTR-MCNC: 124 MG/DL (ref 70–130)
GLUCOSE BLDC GLUCOMTR-MCNC: 143 MG/DL (ref 70–130)
GLUCOSE BLDC GLUCOMTR-MCNC: 184 MG/DL (ref 70–130)
GLUCOSE SERPL-MCNC: 121 MG/DL (ref 65–99)
GRAM STN SPEC: ABNORMAL
GRAM STN SPEC: ABNORMAL
HCT VFR BLD AUTO: 31.3 % (ref 34–46.6)
HGB BLD-MCNC: 9.5 G/DL (ref 12–15.9)
ISOLATED FROM: ABNORMAL
MCH RBC QN AUTO: 29.4 PG (ref 26.6–33)
MCHC RBC AUTO-ENTMCNC: 30.4 G/DL (ref 31.5–35.7)
MCV RBC AUTO: 96.9 FL (ref 79–97)
PLATELET # BLD AUTO: 288 10*3/MM3 (ref 140–450)
PMV BLD AUTO: 10.5 FL (ref 6–12)
POTASSIUM SERPL-SCNC: 3.4 MMOL/L (ref 3.5–5.2)
RBC # BLD AUTO: 3.23 10*6/MM3 (ref 3.77–5.28)
SODIUM SERPL-SCNC: 137 MMOL/L (ref 136–145)
WBC NRBC COR # BLD AUTO: 9.8 10*3/MM3 (ref 3.4–10.8)

## 2024-01-26 PROCEDURE — 99232 SBSQ HOSP IP/OBS MODERATE 35: CPT | Performed by: INTERNAL MEDICINE

## 2024-01-26 PROCEDURE — 85027 COMPLETE CBC AUTOMATED: CPT | Performed by: INTERNAL MEDICINE

## 2024-01-26 PROCEDURE — 63710000001 INSULIN LISPRO (HUMAN) PER 5 UNITS: Performed by: INTERNAL MEDICINE

## 2024-01-26 PROCEDURE — 25810000003 SODIUM CHLORIDE 0.9 % SOLUTION: Performed by: INTERNAL MEDICINE

## 2024-01-26 PROCEDURE — 82948 REAGENT STRIP/BLOOD GLUCOSE: CPT

## 2024-01-26 PROCEDURE — 25010000002 ONDANSETRON PER 1 MG: Performed by: INTERNAL MEDICINE

## 2024-01-26 PROCEDURE — 25010000002 HEPARIN (PORCINE) PER 1000 UNITS: Performed by: INTERNAL MEDICINE

## 2024-01-26 PROCEDURE — 25010000002 CEFTRIAXONE PER 250 MG: Performed by: INTERNAL MEDICINE

## 2024-01-26 PROCEDURE — 80048 BASIC METABOLIC PNL TOTAL CA: CPT | Performed by: INTERNAL MEDICINE

## 2024-01-26 RX ADMIN — PANTOPRAZOLE SODIUM 40 MG: 40 TABLET, DELAYED RELEASE ORAL at 05:26

## 2024-01-26 RX ADMIN — INSULIN LISPRO 2 UNITS: 100 INJECTION, SOLUTION INTRAVENOUS; SUBCUTANEOUS at 20:59

## 2024-01-26 RX ADMIN — MEGESTROL ACETATE 200 MG: 40 SUSPENSION ORAL at 08:07

## 2024-01-26 RX ADMIN — ATORVASTATIN CALCIUM 20 MG: 20 TABLET, FILM COATED ORAL at 08:07

## 2024-01-26 RX ADMIN — SODIUM CHLORIDE 100 ML/HR: 9 INJECTION, SOLUTION INTRAVENOUS at 20:57

## 2024-01-26 RX ADMIN — GABAPENTIN 400 MG: 400 CAPSULE ORAL at 20:58

## 2024-01-26 RX ADMIN — SODIUM CHLORIDE 2000 MG: 9 INJECTION, SOLUTION INTRAVENOUS at 14:10

## 2024-01-26 RX ADMIN — DOCUSATE SODIUM 50 MG AND SENNOSIDES 8.6 MG 2 TABLET: 8.6; 5 TABLET, FILM COATED ORAL at 20:58

## 2024-01-26 RX ADMIN — SODIUM CHLORIDE 100 ML/HR: 9 INJECTION, SOLUTION INTRAVENOUS at 10:28

## 2024-01-26 RX ADMIN — ACETAMINOPHEN 650 MG: 325 TABLET ORAL at 05:26

## 2024-01-26 RX ADMIN — Medication 10 ML: at 21:00

## 2024-01-26 RX ADMIN — HEPARIN SODIUM 5000 UNITS: 5000 INJECTION INTRAVENOUS; SUBCUTANEOUS at 20:58

## 2024-01-26 RX ADMIN — ACETAMINOPHEN 650 MG: 325 TABLET ORAL at 23:17

## 2024-01-26 RX ADMIN — ONDANSETRON 4 MG: 2 INJECTION INTRAMUSCULAR; INTRAVENOUS at 20:59

## 2024-01-26 RX ADMIN — BUPROPION HYDROCHLORIDE 150 MG: 150 TABLET, EXTENDED RELEASE ORAL at 08:07

## 2024-01-26 RX ADMIN — DOCUSATE SODIUM 50 MG AND SENNOSIDES 8.6 MG 2 TABLET: 8.6; 5 TABLET, FILM COATED ORAL at 08:07

## 2024-01-26 RX ADMIN — MEGESTROL ACETATE 200 MG: 40 SUSPENSION ORAL at 20:58

## 2024-01-26 RX ADMIN — LORAZEPAM 2 MG: 2 TABLET ORAL at 20:58

## 2024-01-26 RX ADMIN — HEPARIN SODIUM 5000 UNITS: 5000 INJECTION INTRAVENOUS; SUBCUTANEOUS at 08:07

## 2024-01-26 RX ADMIN — MEGESTROL ACETATE 200 MG: 40 SUSPENSION ORAL at 14:11

## 2024-01-26 NOTE — PROGRESS NOTES
PROGRESS NOTE         Patient Identification:  Name:  Leonie Laws  Age:  83 y.o.  Sex:  female  :  1940  MRN:  [id number]  Visit Number:  [id number]  Primary Care Provider:  Joe Bowden MD         LOS: 4 days       ----------------------------------------------------------------------------------------------------------------------  Subjective       Chief Complaints:    Vomiting and Weakness - Generalized        Interval History:      The patient was sleepy this morning, resting comfortably in bed.  On room air with no apparent distress.  Afebrile.  Denies diarrhea.  Lung exam is clear to auscultation bilaterally.  Abdomen soft and nontender with normoactive bowel sounds.  WBC normal 9.80.      Review of Systems:    Constitutional: no fever, chills and night sweats.  Generalized fatigue.  Eyes: no eye drainage, itching or redness.  HEENT: no mouth sores, dysphagia or nose bleed.  Respiratory: no for shortness of breath, cough or production of sputum.  Cardiovascular: no chest pain, no palpitations, no orthopnea.  Gastrointestinal: no nausea, vomiting or diarrhea. No abdominal pain, hematemesis or rectal bleeding.  Genitourinary: no dysuria or polyuria.  Hematologic/lymphatic: no lymph node abnormalities, no easy bruising or easy bleeding.  Musculoskeletal: no muscle or joint pain.  Skin: No rash and no itching.  Neurological: no loss of consciousness, no seizure, no headache.  Behavioral/Psych: no depression or suicidal ideation.  Endocrine: no hot flashes.  Immunologic: negative.    ----------------------------------------------------------------------------------------------------------------------      Objective       [facility] Meds:  atorvastatin, 20 mg, Oral, Daily  buPROPion XL, 150 mg, Oral, Daily  cefTRIAXone, 2,000 mg, Intravenous, Q24H  gabapentin, 400 mg, Oral, Nightly  heparin (porcine), 5,000 Units, Subcutaneous, Q12H  insulin lispro, 2-7 Units, Subcutaneous, 4x  Daily AC & at Bedtime  LORazepam, 2 mg, Oral, Nightly  megestrol, 200 mg, Oral, TID  pantoprazole, 40 mg, Oral, Q AM  senna-docusate sodium, 2 tablet, Oral, BID  sodium chloride, 10 mL, Intravenous, Q12H      sodium chloride, 100 mL/hr, Last Rate: 100 mL/hr (01/25/24 2045)      ----------------------------------------------------------------------------------------------------------------------    Vital Signs:  Temp:  [97.5 °F (36.4 °C)-98.2 °F (36.8 °C)] 97.5 °F (36.4 °C)  Heart Rate:  [63-85] 79  Resp:  [18] 18  BP: ()/(55-75) 90/55  No data found.  SpO2 Percentage    01/25/24 0300 01/25/24 1900 01/26/24 0300   SpO2: 98% 99% 98%     SpO2:  [98 %-99 %] 98 %  on   ;   Device (Oxygen Therapy): room air    Body mass index is 18.98 kg/m².  Wt Readings from Last 3 Encounters:   01/22/24 56.6 kg (124 lb 12.8 oz)   06/25/20 66.5 kg (146 lb 9.6 oz)   05/04/20 70.1 kg (154 lb 9.6 oz)        Intake/Output Summary (Last 24 hours) at 1/26/2024 0947  Last data filed at 1/26/2024 0300  Gross per 24 hour   Intake 360 ml   Output 2750 ml   Net -2390 ml     Diet: Regular/House Diet; Feeding Assistance - Nursing; Texture: Soft to Chew (NDD 3); Soft to Chew: Chopped Meat; Fluid Consistency: Thin (IDDSI 0)  ----------------------------------------------------------------------------------------------------------------------      Physical Exam:    Constitutional: Elderly and chronically ill-appearing.  Resting comfortably in bed, sleepy.  On room air with no apparent distress.  Eyes any complaints or issues.     HENT:  Head: Normocephalic and atraumatic.  Mouth:  Moist mucous membranes.    Eyes:  Conjunctivae and EOM are normal.  No scleral icterus.  Neck:  Neck supple.  No JVD present.    Cardiovascular:  Normal rate, regular rhythm and normal heart sounds with no murmur. No edema.  Pulmonary/Chest:  No respiratory distress, no wheezes, no crackles, with normal breath sounds and good air movement.  Abdominal:  Soft.  Bowel  sounds are normal.  No distension and no tenderness.   Musculoskeletal:  No edema, no tenderness, and no deformity.  No swelling or redness of joints.  Neurological:  Alert and oriented to person, place, and time.  No facial droop.  No slurred speech.   Skin:  Skin is warm and dry.  No rash noted.  No pallor.   Psychiatric:  Normal mood and affect.  Behavior is normal.        ----------------------------------------------------------------------------------------------------------------------  Results from last 7 days   Lab Units 01/22/24  1534   HSTROP T ng/L <6           Results from last 7 days   Lab Units 01/26/24 0703 01/25/24  0140 01/24/24 0449 01/23/24  0509 01/22/24  1930 01/22/24  1542 01/22/24  1534   CRP mg/dL  --   --   --   --   --   --  25.25*   LACTATE mmol/L  --   --   --   --  1.8 2.2*  --    WBC 10*3/mm3 9.80 12.17* 13.24*   < >  --   --  18.78*   HEMOGLOBIN g/dL 9.5* 9.4* 9.3*   < >  --   --  12.6   HEMATOCRIT % 31.3* 28.6* 27.9*   < >  --   --  38.4   MCV fL 96.9 88.5 88.3   < >  --   --  90.1   MCHC g/dL 30.4* 32.9 33.3   < >  --   --  32.8   PLATELETS 10*3/mm3 288 306 298   < >  --   --  399    < > = values in this interval not displayed.     Results from last 7 days   Lab Units 01/26/24  0703 01/25/24  0140 01/24/24 0449 01/23/24  0509 01/22/24  1534   SODIUM mmol/L 137 133* 133* 130* 127*   POTASSIUM mmol/L 3.4* 3.1* 3.4* 3.9 4.6   MAGNESIUM mg/dL  --   --   --   --  2.0  2.0   CHLORIDE mmol/L 107 101 103 97* 89*   CO2 mmol/L 22.2 22.8 25.5 25.7 23.9   BUN mg/dL 5* 9 15 17 16   CREATININE mg/dL 0.58 0.70 0.86 0.85 0.95   CALCIUM mg/dL 8.1* 7.9* 8.2* 8.7 9.7   GLUCOSE mg/dL 121* 146* 91 138* 233*   ALBUMIN g/dL  --   --   --  2.3* 2.7*   BILIRUBIN mg/dL  --   --   --  0.3 0.6   ALK PHOS U/L  --   --   --  140* 118*   AST (SGOT) U/L  --   --   --  36* 38*   ALT (SGPT) U/L  --   --   --  28 32   Estimated Creatinine Clearance: 66.8 mL/min (by C-G formula based on SCr of 0.58 mg/dL).  No  "results found for: \"AMMONIA\"    Glucose   Date/Time Value Ref Range Status   01/26/2024 0633 114 70 - 130 mg/dL Final   01/25/2024 1947 148 (H) 70 - 130 mg/dL Final   01/25/2024 1630 153 (H) 70 - 130 mg/dL Final   01/25/2024 1017 146 (H) 70 - 130 mg/dL Final   01/25/2024 0735 107 70 - 130 mg/dL Final   01/24/2024 1936 209 (H) 70 - 130 mg/dL Final   01/24/2024 1631 155 (H) 70 - 130 mg/dL Final   01/24/2024 1009 137 (H) 70 - 130 mg/dL Final     Lab Results   Component Value Date    HGBA1C 5.90 (H) 01/22/2024     Lab Results   Component Value Date    TSH 0.897 01/22/2024    FREET4 1.46 12/22/2016       Blood Culture   Date Value Ref Range Status   01/22/2024 Streptococcus pyogenes (Group A) (C)  Preliminary   01/22/2024 Streptococcus pyogenes (Group A) (C)  Final     No results found for: \"URINECX\"  No results found for: \"WOUNDCX\"  No results found for: \"STOOLCX\"  No results found for: \"RESPCX\"  Pain Management Panel           No data to display                  ----------------------------------------------------------------------------------------------------------------------  Imaging Results (Last 24 Hours)       ** No results found for the last 24 hours. **            ----------------------------------------------------------------------------------------------------------------------    Pertinent Infectious Disease Results      Assessment/Plan       Assessment     Severe sepsis with lactic acid greater than 2 on admission  Streptococcus pyogenes bacteremia        Plan      I saw and examined the patient myself this morning with LEONARDO Davis and discussed the plan of care with her and primary RN and here are my findings:    The patient exhibited drowsiness this morning but was resting comfortably in bed. Breathing room air without apparent distress, the patient is afebrile and denies experiencing diarrhea. Lung examination reveals clear auscultation bilaterally, and the abdomen is soft and non-tender with " normoactive bowel sounds. The white blood cell count is within the normal range at 9.80.    The current plan involves maintaining the ceftriaxone regimen at 2 g IV every 24 hours, as scheduled, for the treatment of group A Streptococcus pyogenes bacteremia. Repeat blood cultures are pending, and we will diligently monitor the patient, adjusting antibiotic coverage as deemed appropriate based on ongoing assessments.    ANTIMICROBIAL THERAPY    cefTRIAXone (ROCEPHIN) 2000 mg IVPB in 100 mL NS (VTB)     Code Status:   Code Status and Medical Interventions:   Ordered at: 01/22/24 2049     Medical Intervention Limits:    NO intubation (DNI)     Code Status (Patient has no pulse and is not breathing):    No CPR (Do Not Attempt to Resuscitate)     Medical Interventions (Patient has pulse or is breathing):    Limited Support       LEONARDO Strong  01/26/24  09:47 EST    Electronically signed by LEONARDO Strong, 01/26/24, 9:50 AM EST.  Electronically signed by Jyothi King MD, 01/26/24, 11:54 AM EST.

## 2024-01-26 NOTE — CASE MANAGEMENT/SOCIAL WORK
Discharge Planning Assessment  [facility]     Patient Name: Leonie Laws  MRN: [id number]  Today's Date: 1/26/2024    Admit Date: 1/22/2024    Plan: SS received a call from [facility] Mis TRONCOSO on this date who states pt's sonYash will be getting emergency guardianship for pt. Mis GARCIA states she would like for SS to email copy of guardians[email or web address]. SS scanned document and emailed. SS left copy of guardianship statement at nurses station and notified pt's sonYash and Lead RN. SS later received a message from Lead RN stating pt's sonYahs has picked up statement to go apply for emergency guardianship. SS to follow.       Discharge Plan       Row Name 01/26/24 1253       Plan    Plan SS received a call from Mis GARCIA on this date who states pt's sonYash will be getting emergency guardianship for pt. Mis GARCIA states she would like for SS to email copy of [place]hip [email or web address]. SS scanned document and emailed. SS left copy of guardianship statement at nurses station and notified pt's sonYash and Lead RN. SS later received a message from Lead RN stating pt's sonYash has picked up statement to go apply for emergency guardianship. SS to follow.    Patient/Family in Agreement with Plan yes          CALI Fritz

## 2024-01-26 NOTE — PLAN OF CARE
Goal Outcome Evaluation:            Patient has rested in bed this shift, PRN medications given for complaints of pain and nausea, no other complaints of request at this time, VSS, Will continue plan of care.

## 2024-01-26 NOTE — PROGRESS NOTES
[facility]     Patient Identification:  Name:  Leonie Laws  Age:  83 y.o.  Sex:  female  :  1940  MRN:  [id number]  Visit Number:  [id number]  Primary Care Provider:  Joe Bowden MD    Length of stay:  4    Chief complaint: None    Subjective:    Patient seen and examined at bedside with no nursing staff present.  Patient was asleep when entered the room but easily awakened.  Patient is pleasantly confused.  No new events overnight.  ----------------------------------------------------------------------------------------------------------------------  Current Hospital Meds:  atorvastatin, 20 mg, Oral, Daily  buPROPion XL, 150 mg, Oral, Daily  cefTRIAXone, 2,000 mg, Intravenous, Q24H  gabapentin, 400 mg, Oral, Nightly  heparin (porcine), 5,000 Units, Subcutaneous, Q12H  insulin lispro, 2-7 Units, Subcutaneous, 4x Daily AC & at Bedtime  LORazepam, 2 mg, Oral, Nightly  megestrol, 200 mg, Oral, TID  pantoprazole, 40 mg, Oral, Q AM  senna-docusate sodium, 2 tablet, Oral, BID  sodium chloride, 10 mL, Intravenous, Q12H      sodium chloride, 100 mL/hr, Last Rate: 100 mL/hr (24 1028)      ----------------------------------------------------------------------------------------------------------------------  Vital Signs:  Temp:  [97.5 °F (36.4 °C)-98.2 °F (36.8 °C)] 97.8 °F (36.6 °C)  Heart Rate:  [63-85] 80  Resp:  [18] 18  BP: ()/(50-75) 117/63      24  1515 24  2315   Weight: 58.1 kg (128 lb) 56.6 kg (124 lb 12.8 oz)     Body mass index is 18.98 kg/m².    Intake/Output Summary (Last 24 hours) at 2024 1645  Last data filed at 2024 1200  Gross per 24 hour   Intake 220 ml   Output 1600 ml   Net -1380 ml     Diet: Regular/House Diet; Feeding Assistance - Nursing; Texture: Soft to Chew (NDD 3); Soft to Chew: Chopped Meat; Fluid Consistency: Thin (IDDSI  0)  ----------------------------------------------------------------------------------------------------------------------  Physical exam:  Constitutional: Well-nourished  female in no apparent distress.     HENT:  Head:  Normocephalic and atraumatic.  Mouth:  Moist mucous membranes.    Eyes:  Conjunctivae and EOM are normal.  Pupils are equal, round, and reactive to light.  No scleral icterus.    Neck:  Neck supple. No thyromegaly.  No JVD present.    Cardiovascular:  Regular rate and rhythm with no murmurs, rubs, clicks or gallops appreciated.  Pulmonary/Chest:  Clear to auscultation bilaterally with no crackles, wheezes or rhonchi appreciated.  Abdominal:  Soft. Nontender. Nondistended  Bowel sounds are normal in all four quadrants. No organomegally appreciated.   Musculoskeletal:  No edema, no tenderness, and no deformity.  No red or swollen joints anywhere.    Neurological:  Alert, Cranial nerves II-XII intact with no focal deficits.  No facial droop.  No slurred speech.   Skin:  Warm and dry to palpation with no rashes or lesions appreciated.  Peripheral vascular:  2+ radial and pedal pulses in bilateral upper and lower extremities.  Psychiatric:  Alert and oriented to immediate surroundings, but is not oriented to person, place or time.    No significant change in physical exam in comparison to 1/25/2024  ------------------------------------------------------------------------------------------------------------  ----------------------------------------------------------------------------------------------------------------------  Results from last 7 days   Lab Units 01/22/24  1534   HSTROP T ng/L <6     Results from last 7 days   Lab Units 01/26/24  0703 01/25/24  0140 01/24/24  0449 01/23/24  0509 01/22/24  1930 01/22/24  1542 01/22/24  1534   CRP mg/dL  --   --   --   --   --   --  25.25*   LACTATE mmol/L  --   --   --   --  1.8 2.2*  --    WBC 10*3/mm3 9.80 12.17* 13.24*   < >  --   --  18.78*  "  HEMOGLOBIN g/dL 9.5* 9.4* 9.3*   < >  --   --  12.6   HEMATOCRIT % 31.3* 28.6* 27.9*   < >  --   --  38.4   MCV fL 96.9 88.5 88.3   < >  --   --  90.1   MCHC g/dL 30.4* 32.9 33.3   < >  --   --  32.8   PLATELETS 10*3/mm3 288 306 298   < >  --   --  399    < > = values in this interval not displayed.         Results from last 7 days   Lab Units 01/26/24  0703 01/25/24  0140 01/24/24  0449 01/23/24  0509 01/22/24  1534   SODIUM mmol/L 137 133* 133* 130* 127*   POTASSIUM mmol/L 3.4* 3.1* 3.4* 3.9 4.6   MAGNESIUM mg/dL  --   --   --   --  2.0  2.0   CHLORIDE mmol/L 107 101 103 97* 89*   CO2 mmol/L 22.2 22.8 25.5 25.7 23.9   BUN mg/dL 5* 9 15 17 16   CREATININE mg/dL 0.58 0.70 0.86 0.85 0.95   CALCIUM mg/dL 8.1* 7.9* 8.2* 8.7 9.7   GLUCOSE mg/dL 121* 146* 91 138* 233*   ALBUMIN g/dL  --   --   --  2.3* 2.7*   BILIRUBIN mg/dL  --   --   --  0.3 0.6   ALK PHOS U/L  --   --   --  140* 118*   AST (SGOT) U/L  --   --   --  36* 38*   ALT (SGPT) U/L  --   --   --  28 32   Estimated Creatinine Clearance: 66.8 mL/min (by C-G formula based on SCr of 0.58 mg/dL).    No results found for: \"AMMONIA\"      Blood Culture   Date Value Ref Range Status   01/22/2024 Abnormal Stain (C)  Preliminary   01/22/2024 Abnormal Stain (C)  Preliminary     No results found for: \"URINECX\"  No results found for: \"WOUNDCX\"  No results found for: \"STOOLCX\"    I have personally looked at the labs and they are summarized above.  ----------------------------------------------------------------------------------------------------------------------  Imaging Results (Last 24 Hours)       ** No results found for the last 24 hours. **          ----------------------------------------------------------------------------------------------------------------------  Assessment and Plan:    Aspiration pneumonia/pneumonitis - Appreciate ID recommendations, continue Rocephin.     2.  Dementia - Patient son is in the process of obtaining emergency guardianship    3.  " Strep pyogenes bacteremia - have transitioned antibiotic therapy to monotherapy Rocephin.  Repeat blood cultures negative to date        Disposition will likely require several more days hospitalization    Eulalio Clarke DO   01/26/24 16:45 EST

## 2024-01-26 NOTE — PLAN OF CARE
Goal Outcome Evaluation:                 Patient resting in bed at this time with spouse at BS. Alert, disoriented x3, intermittent confusion. Patient has slept most of this shift. Patient refused activity when attempted this shift. Patient educated on the importance of staying active, continues to refuse. No requests or complaints at this time. Will continue with POC.

## 2024-01-26 NOTE — PROGRESS NOTES
[facility]     Patient Identification:  Name:  Leonie Lasw  Age:  83 y.o.  Sex:  female  :  1940  MRN:  [id number]  Visit Number:  [id number]  Primary Care Provider:  Joe Bowden MD    Length of stay:  3    Chief complaint: None    Subjective:    Patient continues to improve, appears much more awake and talkative today.  Oral intake has improved.  No new events overnight.  ----------------------------------------------------------------------------------------------------------------------  Current Hospital Meds:  atorvastatin, 20 mg, Oral, Daily  buPROPion XL, 150 mg, Oral, Daily  cefTRIAXone, 2,000 mg, Intravenous, Q24H  gabapentin, 400 mg, Oral, Nightly  heparin (porcine), 5,000 Units, Subcutaneous, Q12H  insulin lispro, 2-7 Units, Subcutaneous, 4x Daily AC & at Bedtime  LORazepam, 2 mg, Oral, Nightly  megestrol, 200 mg, Oral, TID  pantoprazole, 40 mg, Oral, Q AM  senna-docusate sodium, 2 tablet, Oral, BID  sodium chloride, 10 mL, Intravenous, Q12H      sodium chloride, 100 mL/hr, Last Rate: 100 mL/hr (24 0529)      ----------------------------------------------------------------------------------------------------------------------  Vital Signs:  Temp:  [97.5 °F (36.4 °C)-98.7 °F (37.1 °C)] 98.1 °F (36.7 °C)  Heart Rate:  [78-86] 80  Resp:  [18-20] 18  BP: (106-125)/(52-63) 121/61      24  1515 24  2315   Weight: 58.1 kg (128 lb) 56.6 kg (124 lb 12.8 oz)     Body mass index is 18.98 kg/m².    Intake/Output Summary (Last 24 hours) at 2024 1903  Last data filed at 2024 1700  Gross per 24 hour   Intake 500 ml   Output 1950 ml   Net -1450 ml     Diet: Regular/House Diet; Feeding Assistance - Nursing; Texture: Soft to Chew (NDD 3); Soft to Chew: Chopped Meat; Fluid Consistency: Thin (IDDSI 0)  ----------------------------------------------------------------------------------------------------------------------  Physical  exam:  Constitutional: Well-nourished  female in no apparent distress.     HENT:  Head:  Normocephalic and atraumatic.  Mouth:  Moist mucous membranes.    Eyes:  Conjunctivae and EOM are normal.  Pupils are equal, round, and reactive to light.  No scleral icterus.    Neck:  Neck supple. No thyromegaly.  No JVD present.    Cardiovascular:  Regular rate and rhythm with no murmurs, rubs, clicks or gallops appreciated.  Pulmonary/Chest:  Clear to auscultation bilaterally with no crackles, wheezes or rhonchi appreciated.  Abdominal:  Soft. Nontender. Nondistended  Bowel sounds are normal in all four quadrants. No organomegally appreciated.   Musculoskeletal:  No edema, no tenderness, and no deformity.  No red or swollen joints anywhere.    Neurological:  Alert, Cranial nerves II-XII intact with no focal deficits.  No facial droop.  No slurred speech.   Skin:  Warm and dry to palpation with no rashes or lesions appreciated.  Peripheral vascular:  2+ radial and pedal pulses in bilateral upper and lower extremities.  Psychiatric:  Alert and oriented to immediate surroundings, but is not oriented to person, place or time.    No significant change in physical exam in comparison to 1/24/2024  ------------------------------------------------------------------------------------------------------------  ----------------------------------------------------------------------------------------------------------------------  Results from last 7 days   Lab Units 01/22/24  1534   HSTROP T ng/L <6     Results from last 7 days   Lab Units 01/25/24  0140 01/24/24  0449 01/23/24  0509 01/22/24  1930 01/22/24  1542 01/22/24  1534   CRP mg/dL  --   --   --   --   --  25.25*   LACTATE mmol/L  --   --   --  1.8 2.2*  --    WBC 10*3/mm3 12.17* 13.24* 16.16*  --   --  18.78*   HEMOGLOBIN g/dL 9.4* 9.3* 9.3*  --   --  12.6   HEMATOCRIT % 28.6* 27.9* 27.9*  --   --  38.4   MCV fL 88.5 88.3 87.7  --   --  90.1   MCHC g/dL 32.9 33.3 33.3   "--   --  32.8   PLATELETS 10*3/mm3 306 298 340  --   --  399         Results from last 7 days   Lab Units 01/25/24  0140 01/24/24  0449 01/23/24  0509 01/22/24  1534   SODIUM mmol/L 133* 133* 130* 127*   POTASSIUM mmol/L 3.1* 3.4* 3.9 4.6   MAGNESIUM mg/dL  --   --   --  2.0  2.0   CHLORIDE mmol/L 101 103 97* 89*   CO2 mmol/L 22.8 25.5 25.7 23.9   BUN mg/dL 9 15 17 16   CREATININE mg/dL 0.70 0.86 0.85 0.95   CALCIUM mg/dL 7.9* 8.2* 8.7 9.7   GLUCOSE mg/dL 146* 91 138* 233*   ALBUMIN g/dL  --   --  2.3* 2.7*   BILIRUBIN mg/dL  --   --  0.3 0.6   ALK PHOS U/L  --   --  140* 118*   AST (SGOT) U/L  --   --  36* 38*   ALT (SGPT) U/L  --   --  28 32   Estimated Creatinine Clearance: 55.4 mL/min (by C-G formula based on SCr of 0.7 mg/dL).    No results found for: \"AMMONIA\"      Blood Culture   Date Value Ref Range Status   01/22/2024 Abnormal Stain (C)  Preliminary   01/22/2024 Abnormal Stain (C)  Preliminary     No results found for: \"URINECX\"  No results found for: \"WOUNDCX\"  No results found for: \"STOOLCX\"    I have personally looked at the labs and they are summarized above.  ----------------------------------------------------------------------------------------------------------------------  Imaging Results (Last 24 Hours)       ** No results found for the last 24 hours. **          ----------------------------------------------------------------------------------------------------------------------  Assessment and Plan:    Aspiration pneumonia/pneumonitis - Appreciate ID recommendations, sinew Rocephin and Vancomycin.     2.  Dementia - Patient does appear to have clinical exam findings concerning for underlying dementia.  Did discuss this finding with patient's  at bedside, who confirms he has had suspicion that patient has had dementia for several months. Patient's  did state the patient's son was previously made medical POA.  Case management has reportedly contacted patient's son who states while " this did occur, he has lost the documentation proving POA, and he also expressed concern that he would not be able to adequately serve as POA, as he is out of state working.     3.  Tremors - now resolved, initially there was some concern of tremors/seizure-like activity.  Cerebel was obtained which demonstrated 0% seizure burden.  Etiology was likely related to rigors.  2 of 2 blood cultures demonstrate group A strep pyogenes.  Sensitivities demonstrate sensitivity to vancomycin and Rocephin.  Continue Rocephin, repeat blood cultures x 2 today. Appreciate ID recommendations.    4.  Strep pyogenes bacteremia - have transitioned antibiotic therapy to monotherapy Rocephin.  Patient does not clinically appear to have symptoms consistent with toxic shock syndrome, will continue to defer clindamycin at this time.        Disposition will likely require several more days hospitalization    Eulalio Clarke,    01/25/24 19:03 EST

## 2024-01-27 LAB
ANION GAP SERPL CALCULATED.3IONS-SCNC: 6.7 MMOL/L (ref 5–15)
BUN SERPL-MCNC: 6 MG/DL (ref 8–23)
BUN/CREAT SERPL: 9.7 (ref 7–25)
CALCIUM SPEC-SCNC: 8.1 MG/DL (ref 8.6–10.5)
CHLORIDE SERPL-SCNC: 104 MMOL/L (ref 98–107)
CO2 SERPL-SCNC: 24.3 MMOL/L (ref 22–29)
CREAT SERPL-MCNC: 0.62 MG/DL (ref 0.57–1)
DEPRECATED RDW RBC AUTO: 48.4 FL (ref 37–54)
EGFRCR SERPLBLD CKD-EPI 2021: 89 ML/MIN/1.73
ERYTHROCYTE [DISTWIDTH] IN BLOOD BY AUTOMATED COUNT: 14.9 % (ref 12.3–15.4)
GLUCOSE BLDC GLUCOMTR-MCNC: 108 MG/DL (ref 70–130)
GLUCOSE BLDC GLUCOMTR-MCNC: 152 MG/DL (ref 70–130)
GLUCOSE BLDC GLUCOMTR-MCNC: 172 MG/DL (ref 70–130)
GLUCOSE BLDC GLUCOMTR-MCNC: 94 MG/DL (ref 70–130)
GLUCOSE SERPL-MCNC: 122 MG/DL (ref 65–99)
HCT VFR BLD AUTO: 26.9 % (ref 34–46.6)
HGB BLD-MCNC: 8.9 G/DL (ref 12–15.9)
MAGNESIUM SERPL-MCNC: 1.9 MG/DL (ref 1.6–2.4)
MCH RBC QN AUTO: 29.2 PG (ref 26.6–33)
MCHC RBC AUTO-ENTMCNC: 33.1 G/DL (ref 31.5–35.7)
MCV RBC AUTO: 88.2 FL (ref 79–97)
PLATELET # BLD AUTO: 381 10*3/MM3 (ref 140–450)
PMV BLD AUTO: 10.3 FL (ref 6–12)
POTASSIUM SERPL-SCNC: 3 MMOL/L (ref 3.5–5.2)
RBC # BLD AUTO: 3.05 10*6/MM3 (ref 3.77–5.28)
SODIUM SERPL-SCNC: 135 MMOL/L (ref 136–145)
WBC NRBC COR # BLD AUTO: 12.3 10*3/MM3 (ref 3.4–10.8)

## 2024-01-27 PROCEDURE — 80048 BASIC METABOLIC PNL TOTAL CA: CPT | Performed by: INTERNAL MEDICINE

## 2024-01-27 PROCEDURE — 25010000002 CEFTRIAXONE PER 250 MG: Performed by: NURSE PRACTITIONER

## 2024-01-27 PROCEDURE — 83735 ASSAY OF MAGNESIUM: CPT | Performed by: INTERNAL MEDICINE

## 2024-01-27 PROCEDURE — 99232 SBSQ HOSP IP/OBS MODERATE 35: CPT | Performed by: INTERNAL MEDICINE

## 2024-01-27 PROCEDURE — 85027 COMPLETE CBC AUTOMATED: CPT | Performed by: INTERNAL MEDICINE

## 2024-01-27 PROCEDURE — 82948 REAGENT STRIP/BLOOD GLUCOSE: CPT

## 2024-01-27 PROCEDURE — 99232 SBSQ HOSP IP/OBS MODERATE 35: CPT | Performed by: NURSE PRACTITIONER

## 2024-01-27 PROCEDURE — 63710000001 INSULIN LISPRO (HUMAN) PER 5 UNITS: Performed by: INTERNAL MEDICINE

## 2024-01-27 PROCEDURE — 63710000001 ONDANSETRON PER 8 MG: Performed by: INTERNAL MEDICINE

## 2024-01-27 PROCEDURE — 25810000003 SODIUM CHLORIDE 0.9 % SOLUTION: Performed by: INTERNAL MEDICINE

## 2024-01-27 PROCEDURE — 25010000002 HEPARIN (PORCINE) PER 1000 UNITS: Performed by: INTERNAL MEDICINE

## 2024-01-27 RX ORDER — CHOLECALCIFEROL (VITAMIN D3) 125 MCG
5 CAPSULE ORAL NIGHTLY PRN
Status: DISCONTINUED | OUTPATIENT
Start: 2024-01-27 | End: 2024-01-31 | Stop reason: HOSPADM

## 2024-01-27 RX ADMIN — MEGESTROL ACETATE 200 MG: 40 SUSPENSION ORAL at 20:03

## 2024-01-27 RX ADMIN — DOCUSATE SODIUM 50 MG AND SENNOSIDES 8.6 MG 2 TABLET: 8.6; 5 TABLET, FILM COATED ORAL at 09:00

## 2024-01-27 RX ADMIN — GABAPENTIN 400 MG: 400 CAPSULE ORAL at 20:02

## 2024-01-27 RX ADMIN — INSULIN LISPRO 2 UNITS: 100 INJECTION, SOLUTION INTRAVENOUS; SUBCUTANEOUS at 20:15

## 2024-01-27 RX ADMIN — LORAZEPAM 2 MG: 2 TABLET ORAL at 20:02

## 2024-01-27 RX ADMIN — ACETAMINOPHEN 650 MG: 325 TABLET ORAL at 14:38

## 2024-01-27 RX ADMIN — ACETAMINOPHEN 650 MG: 325 TABLET ORAL at 20:03

## 2024-01-27 RX ADMIN — BUPROPION HYDROCHLORIDE 150 MG: 150 TABLET, EXTENDED RELEASE ORAL at 09:01

## 2024-01-27 RX ADMIN — INSULIN LISPRO 2 UNITS: 100 INJECTION, SOLUTION INTRAVENOUS; SUBCUTANEOUS at 10:47

## 2024-01-27 RX ADMIN — HEPARIN SODIUM 5000 UNITS: 5000 INJECTION INTRAVENOUS; SUBCUTANEOUS at 09:01

## 2024-01-27 RX ADMIN — SODIUM CHLORIDE 100 ML/HR: 9 INJECTION, SOLUTION INTRAVENOUS at 12:08

## 2024-01-27 RX ADMIN — HEPARIN SODIUM 5000 UNITS: 5000 INJECTION INTRAVENOUS; SUBCUTANEOUS at 20:02

## 2024-01-27 RX ADMIN — MEGESTROL ACETATE 200 MG: 40 SUSPENSION ORAL at 09:01

## 2024-01-27 RX ADMIN — Medication 10 ML: at 20:03

## 2024-01-27 RX ADMIN — MEGESTROL ACETATE 200 MG: 40 SUSPENSION ORAL at 14:40

## 2024-01-27 RX ADMIN — ATORVASTATIN CALCIUM 20 MG: 20 TABLET, FILM COATED ORAL at 09:00

## 2024-01-27 RX ADMIN — Medication 5 MG: at 23:06

## 2024-01-27 RX ADMIN — SODIUM CHLORIDE 2000 MG: 9 INJECTION, SOLUTION INTRAVENOUS at 12:03

## 2024-01-27 RX ADMIN — SODIUM CHLORIDE 100 ML/HR: 9 INJECTION, SOLUTION INTRAVENOUS at 22:24

## 2024-01-27 RX ADMIN — ONDANSETRON HYDROCHLORIDE 8 MG: 4 TABLET, FILM COATED ORAL at 17:43

## 2024-01-27 NOTE — PLAN OF CARE
Goal Outcome Evaluation:               Patient resting in bed at this time with family at bedside. Alert, disoriented x2, confused. No acute changes noted. Patient had bath this shift. No requests or complaints at this time. Will continue with POC.

## 2024-01-27 NOTE — PLAN OF CARE
Goal Outcome Evaluation:               Patient has rested in bed this shift, up to chair per request with x 2 max assist, no complaints or request at this time, VSS, Will continue plan of care.

## 2024-01-27 NOTE — PROGRESS NOTES
PROGRESS NOTE         Patient Identification:  Name:  Leonie Laws  Age:  83 y.o.  Sex:  female  :  1940  MRN:  [id number]  Visit Number:  [id number]  Primary Care Provider:  Joe Bowden MD         LOS: 5 days       ----------------------------------------------------------------------------------------------------------------------  Subjective       Chief Complaints:    Vomiting and Weakness - Generalized        Interval History:      Patient resting in bed this morning.  Currently on room air with no apparent distress.  No issues or complaints.  Lungs clear to auscultation bilaterally.  Abdomen mildly distended, soft, nontender.  Afebrile, no reported diarrhea.  WBC slightly elevated 12.30.      Review of Systems:    Constitutional: no fever, chills and night sweats.  Generalized fatigue.  Eyes: no eye drainage, itching or redness.  HEENT: no mouth sores, dysphagia or nose bleed.  Respiratory: no for shortness of breath, cough or production of sputum.  Cardiovascular: no chest pain, no palpitations, no orthopnea.  Gastrointestinal: no nausea, vomiting or diarrhea. No abdominal pain, hematemesis or rectal bleeding.  Genitourinary: no dysuria or polyuria.  Hematologic/lymphatic: no lymph node abnormalities, no easy bruising or easy bleeding.  Musculoskeletal: no muscle or joint pain.  Skin: No rash and no itching.  Neurological: no loss of consciousness, no seizure, no headache.  Behavioral/Psych: no depression or suicidal ideation.  Endocrine: no hot flashes.  Immunologic: negative.    ----------------------------------------------------------------------------------------------------------------------      Objective       Current [facility] Meds:  atorvastatin, 20 mg, Oral, Daily  buPROPion XL, 150 mg, Oral, Daily  cefTRIAXone, 2,000 mg, Intravenous, Q24H  gabapentin, 400 mg, Oral, Nightly  heparin (porcine), 5,000 Units, Subcutaneous, Q12H  insulin lispro, 2-7 Units, Subcutaneous, 4x  Daily AC & at Bedtime  LORazepam, 2 mg, Oral, Nightly  megestrol, 200 mg, Oral, TID  pantoprazole, 40 mg, Oral, Q AM  senna-docusate sodium, 2 tablet, Oral, BID  sodium chloride, 10 mL, Intravenous, Q12H      sodium chloride, 100 mL/hr, Last Rate: 100 mL/hr (01/26/24 2057)      ----------------------------------------------------------------------------------------------------------------------    Vital Signs:  Temp:  [97.8 °F (36.6 °C)-98.9 °F (37.2 °C)] 98.5 °F (36.9 °C)  Heart Rate:  [78-97] 78  Resp:  [18-20] 18  BP: (106-138)/(58-77) 106/58  No data found.  SpO2 Percentage    01/26/24 1900 01/26/24 2300 01/27/24 0300   SpO2: 97% 96% 97%     SpO2:  [96 %-97 %] 97 %  on   ;   Device (Oxygen Therapy): room air    Body mass index is 18.98 kg/m².  Wt Readings from Last 3 Encounters:   01/22/24 56.6 kg (124 lb 12.8 oz)   06/25/20 66.5 kg (146 lb 9.6 oz)   05/04/20 70.1 kg (154 lb 9.6 oz)        Intake/Output Summary (Last 24 hours) at 1/27/2024 1055  Last data filed at 1/27/2024 0500  Gross per 24 hour   Intake 100 ml   Output 1050 ml   Net -950 ml     Diet: Regular/House Diet; Feeding Assistance - Nursing; Texture: Soft to Chew (NDD 3); Soft to Chew: Chopped Meat; Fluid Consistency: Thin (IDDSI 0)  ----------------------------------------------------------------------------------------------------------------------      Physical Exam:    Constitutional: Elderly and chronically ill-appearing.  Resting comfortably in bed, sleepy.  On room air with no apparent distress.    HENT:  Head: Normocephalic and atraumatic.  Mouth:  Moist mucous membranes.    Eyes:  Conjunctivae and EOM are normal.  No scleral icterus.  Neck:  Neck supple.  No JVD present.    Cardiovascular:  Normal rate, regular rhythm and normal heart sounds with no murmur. No edema.  Pulmonary/Chest:  No respiratory distress, no wheezes, no crackles, with normal breath sounds and good air movement.  On room air.    Abdominal:  Soft.  Bowel sounds are normal.   No distension and no tenderness.   Musculoskeletal:  No edema, no tenderness, and no deformity.  No swelling or redness of joints.  Neurological:  Alert and oriented to person, place, and time.  No facial droop.  No slurred speech.   Skin:  Skin is warm and dry.  No rash noted.  No pallor.   Psychiatric:  Normal mood and affect.  Behavior is normal.        ----------------------------------------------------------------------------------------------------------------------  Results from last 7 days   Lab Units 01/22/24  1534   HSTROP T ng/L <6           Results from last 7 days   Lab Units 01/27/24 0204 01/26/24  0703 01/25/24  0140 01/23/24  0509 01/22/24  1930 01/22/24  1542 01/22/24  1534   CRP mg/dL  --   --   --   --   --   --  25.25*   LACTATE mmol/L  --   --   --   --  1.8 2.2*  --    WBC 10*3/mm3 12.30* 9.80 12.17*   < >  --   --  18.78*   HEMOGLOBIN g/dL 8.9* 9.5* 9.4*   < >  --   --  12.6   HEMATOCRIT % 26.9* 31.3* 28.6*   < >  --   --  38.4   MCV fL 88.2 96.9 88.5   < >  --   --  90.1   MCHC g/dL 33.1 30.4* 32.9   < >  --   --  32.8   PLATELETS 10*3/mm3 381 288 306   < >  --   --  399    < > = values in this interval not displayed.     Results from last 7 days   Lab Units 01/27/24 0204 01/26/24  0703 01/25/24  0140 01/24/24  0449 01/23/24  0509 01/22/24  1534   SODIUM mmol/L 135* 137 133*   < > 130* 127*   POTASSIUM mmol/L 3.0* 3.4* 3.1*   < > 3.9 4.6   MAGNESIUM mg/dL  --   --   --   --   --  2.0  2.0   CHLORIDE mmol/L 104 107 101   < > 97* 89*   CO2 mmol/L 24.3 22.2 22.8   < > 25.7 23.9   BUN mg/dL 6* 5* 9   < > 17 16   CREATININE mg/dL 0.62 0.58 0.70   < > 0.85 0.95   CALCIUM mg/dL 8.1* 8.1* 7.9*   < > 8.7 9.7   GLUCOSE mg/dL 122* 121* 146*   < > 138* 233*   ALBUMIN g/dL  --   --   --   --  2.3* 2.7*   BILIRUBIN mg/dL  --   --   --   --  0.3 0.6   ALK PHOS U/L  --   --   --   --  140* 118*   AST (SGOT) U/L  --   --   --   --  36* 38*   ALT (SGPT) U/L  --   --   --   --  28 32    < > = values in  "this interval not displayed.   Estimated Creatinine Clearance: 62.5 mL/min (by C-G formula based on SCr of 0.62 mg/dL).  No results found for: \"AMMONIA\"    Glucose   Date/Time Value Ref Range Status   01/27/2024 1028 152 (H) 70 - 130 mg/dL Final   01/27/2024 0633 108 70 - 130 mg/dL Final   01/26/2024 1940 184 (H) 70 - 130 mg/dL Final   01/26/2024 1614 143 (H) 70 - 130 mg/dL Final   01/26/2024 1029 124 70 - 130 mg/dL Final   01/26/2024 0633 114 70 - 130 mg/dL Final   01/25/2024 1947 148 (H) 70 - 130 mg/dL Final   01/25/2024 1630 153 (H) 70 - 130 mg/dL Final     Lab Results   Component Value Date    HGBA1C 5.90 (H) 01/22/2024     Lab Results   Component Value Date    TSH 0.897 01/22/2024    FREET4 1.46 12/22/2016       Blood Culture   Date Value Ref Range Status   01/22/2024 Streptococcus pyogenes (Group A) (C)  Preliminary   01/22/2024 Streptococcus pyogenes (Group A) (C)  Final     No results found for: \"URINECX\"  No results found for: \"WOUNDCX\"  No results found for: \"STOOLCX\"  No results found for: \"RESPCX\"  Pain Management Panel           No data to display                  ----------------------------------------------------------------------------------------------------------------------  Imaging Results (Last 24 Hours)       ** No results found for the last 24 hours. **            ----------------------------------------------------------------------------------------------------------------------    Pertinent Infectious Disease Results      Assessment/Plan       Assessment     Severe sepsis with lactic acid greater than 2 on admission  Streptococcus pyogenes bacteremia        Plan      Patient resting in bed this morning.  Currently on room air with no apparent distress.  No issues or complaints.  Lungs clear to auscultation bilaterally.  Abdomen mildly distended, soft, nontender.  Afebrile, no reported diarrhea.  WBC slightly elevated 12.30.  Blood cultures from 1/25/2024 show no growth thus " far.    Recommend to continue ceftriaxone 2 g IV every 24 hours for Streptococcus pyogenes group A bacteremia.  Patient will require 2 weeks of antibiotic therapy from first negative blood culture through 2/8/2024.  We will continue to follow closely and adjust antibiotic therapy as needed.        ANTIMICROBIAL THERAPY    cefTRIAXone (ROCEPHIN) 2000 mg IVPB in 100 mL NS (VTB)     Code Status:   Code Status and Medical Interventions:   Ordered at: 01/22/24 2049     Medical Intervention Limits:    NO intubation (DNI)     Code Status (Patient has no pulse and is not breathing):    No CPR (Do Not Attempt to Resuscitate)     Medical Interventions (Patient has pulse or is breathing):    Limited Support       Shandra Burroughs, LEONARDO  01/27/24  10:55 EST

## 2024-01-27 NOTE — PROGRESS NOTES
[facility]     Patient Identification:  Name:  Leonie Laws  Age:  83 y.o.  Sex:  female  :  1940  MRN:  [id number]  Visit Number:  [id number]  Primary Care Provider:  Joe Bowden MD    Length of stay:  5    Chief complaint: None    Subjective:    Patient seen and examined at bedside with patient's  present.  Patient is awake and watching tv and eating ice cream when I entered the room. She has no new complaints today, no new events overnight.  ----------------------------------------------------------------------------------------------------------------------  Current Hospital Meds:  atorvastatin, 20 mg, Oral, Daily  buPROPion XL, 150 mg, Oral, Daily  cefTRIAXone, 2,000 mg, Intravenous, Q24H  gabapentin, 400 mg, Oral, Nightly  heparin (porcine), 5,000 Units, Subcutaneous, Q12H  insulin lispro, 2-7 Units, Subcutaneous, 4x Daily AC & at Bedtime  LORazepam, 2 mg, Oral, Nightly  megestrol, 200 mg, Oral, TID  pantoprazole, 40 mg, Oral, Q AM  senna-docusate sodium, 2 tablet, Oral, BID  sodium chloride, 10 mL, Intravenous, Q12H      sodium chloride, 100 mL/hr, Last Rate: 100 mL/hr (24 1208)      ----------------------------------------------------------------------------------------------------------------------  Vital Signs:  Temp:  [98.2 °F (36.8 °C)-98.9 °F (37.2 °C)] 98.3 °F (36.8 °C)  Heart Rate:  [78-97] 89  Resp:  [18-20] 18  BP: (106-138)/(58-77) 127/65      24  1515 24  2315   Weight: 58.1 kg (128 lb) 56.6 kg (124 lb 12.8 oz)     Body mass index is 18.98 kg/m².    Intake/Output Summary (Last 24 hours) at 2024 1555  Last data filed at 2024 0500  Gross per 24 hour   Intake --   Output 1050 ml   Net -1050 ml     Diet: Regular/House Diet; Feeding Assistance - Nursing; Texture: Soft to Chew (NDD 3); Soft to Chew: Chopped Meat; Fluid Consistency: Thin (IDDSI  0)  ----------------------------------------------------------------------------------------------------------------------  Physical exam:  Constitutional: Well-nourished  female in no apparent distress.     HENT:  Head:  Normocephalic and atraumatic.  Mouth:  Moist mucous membranes.    Eyes:  Conjunctivae and EOM are normal.  Pupils are equal, round, and reactive to light.  No scleral icterus.    Neck:  Neck supple. No thyromegaly.  No JVD present.    Cardiovascular:  Regular rate and rhythm with no murmurs, rubs, clicks or gallops appreciated.  Pulmonary/Chest:  Clear to auscultation bilaterally with no crackles, wheezes or rhonchi appreciated.  Abdominal:  Soft. Nontender. Nondistended  Bowel sounds are normal in all four quadrants. No organomegally appreciated.   Musculoskeletal:  No edema, no tenderness, and no deformity.  No red or swollen joints anywhere.    Neurological:  Alert, Cranial nerves II-XII intact with no focal deficits.  No facial droop.  No slurred speech.   Skin:  Warm and dry to palpation with no rashes or lesions appreciated.  Peripheral vascular:  2+ radial and pedal pulses in bilateral upper and lower extremities.  Psychiatric:  Alert and oriented to immediate surroundings, but is not oriented to person, place or time.    No significant change in physical exam in comparison to 1/26/2024  ------------------------------------------------------------------------------------------------------------  ----------------------------------------------------------------------------------------------------------------------  Results from last 7 days   Lab Units 01/22/24  1534   HSTROP T ng/L <6     Results from last 7 days   Lab Units 01/27/24  0204 01/26/24  0703 01/25/24  0140 01/23/24  0509 01/22/24  1930 01/22/24  1542 01/22/24  1534   CRP mg/dL  --   --   --   --   --   --  25.25*   LACTATE mmol/L  --   --   --   --  1.8 2.2*  --    WBC 10*3/mm3 12.30* 9.80 12.17*   < >  --   --  18.78*  "  HEMOGLOBIN g/dL 8.9* 9.5* 9.4*   < >  --   --  12.6   HEMATOCRIT % 26.9* 31.3* 28.6*   < >  --   --  38.4   MCV fL 88.2 96.9 88.5   < >  --   --  90.1   MCHC g/dL 33.1 30.4* 32.9   < >  --   --  32.8   PLATELETS 10*3/mm3 381 288 306   < >  --   --  399    < > = values in this interval not displayed.         Results from last 7 days   Lab Units 01/27/24  0204 01/26/24  0703 01/25/24  0140 01/24/24  0449 01/23/24  0509 01/22/24  1534   SODIUM mmol/L 135* 137 133*   < > 130* 127*   POTASSIUM mmol/L 3.0* 3.4* 3.1*   < > 3.9 4.6   MAGNESIUM mg/dL  --   --   --   --   --  2.0  2.0   CHLORIDE mmol/L 104 107 101   < > 97* 89*   CO2 mmol/L 24.3 22.2 22.8   < > 25.7 23.9   BUN mg/dL 6* 5* 9   < > 17 16   CREATININE mg/dL 0.62 0.58 0.70   < > 0.85 0.95   CALCIUM mg/dL 8.1* 8.1* 7.9*   < > 8.7 9.7   GLUCOSE mg/dL 122* 121* 146*   < > 138* 233*   ALBUMIN g/dL  --   --   --   --  2.3* 2.7*   BILIRUBIN mg/dL  --   --   --   --  0.3 0.6   ALK PHOS U/L  --   --   --   --  140* 118*   AST (SGOT) U/L  --   --   --   --  36* 38*   ALT (SGPT) U/L  --   --   --   --  28 32    < > = values in this interval not displayed.   Estimated Creatinine Clearance: 62.5 mL/min (by C-G formula based on SCr of 0.62 mg/dL).    No results found for: \"AMMONIA\"      Blood Culture   Date Value Ref Range Status   01/22/2024 Abnormal Stain (C)  Preliminary   01/22/2024 Abnormal Stain (C)  Preliminary     No results found for: \"URINECX\"  No results found for: \"WOUNDCX\"  No results found for: \"STOOLCX\"    I have personally looked at the labs and they are summarized above.  ----------------------------------------------------------------------------------------------------------------------  Imaging Results (Last 24 Hours)       ** No results found for the last 24 hours. **          ----------------------------------------------------------------------------------------------------------------------  Assessment and Plan:    Aspiration pneumonia/pneumonitis - " Appreciate ID recommendations, continue Rocephin    2.  Dementia - Patient son is in the process of obtaining emergency guardianship    3.  Strep pyogenes bacteremia -will continue Rocephin 2 g IV every 24 hours for now.  Patient will require 2 weeks of antibiotic therapy to be completed 2/8/2024.  Will discuss with infectious disease the possibility of transitioning to oral antibiotics for ease of discharge.    4.  General debility -family is requesting inpatient rehab or skilled nursing facility for continued PT/OT.        Disposition will likely require several more days hospitalization    Eulalio Clarke, DO   01/27/24 15:55 EST

## 2024-01-28 LAB
ANION GAP SERPL CALCULATED.3IONS-SCNC: 7.6 MMOL/L (ref 5–15)
BUN SERPL-MCNC: 5 MG/DL (ref 8–23)
BUN/CREAT SERPL: 8.1 (ref 7–25)
CALCIUM SPEC-SCNC: 8.1 MG/DL (ref 8.6–10.5)
CHLORIDE SERPL-SCNC: 107 MMOL/L (ref 98–107)
CO2 SERPL-SCNC: 23.4 MMOL/L (ref 22–29)
CREAT SERPL-MCNC: 0.62 MG/DL (ref 0.57–1)
DEPRECATED RDW RBC AUTO: 50 FL (ref 37–54)
EGFRCR SERPLBLD CKD-EPI 2021: 89 ML/MIN/1.73
ERYTHROCYTE [DISTWIDTH] IN BLOOD BY AUTOMATED COUNT: 15.5 % (ref 12.3–15.4)
GLUCOSE BLDC GLUCOMTR-MCNC: 110 MG/DL (ref 70–130)
GLUCOSE BLDC GLUCOMTR-MCNC: 120 MG/DL (ref 70–130)
GLUCOSE BLDC GLUCOMTR-MCNC: 123 MG/DL (ref 70–130)
GLUCOSE BLDC GLUCOMTR-MCNC: 148 MG/DL (ref 70–130)
GLUCOSE SERPL-MCNC: 135 MG/DL (ref 65–99)
HCT VFR BLD AUTO: 26 % (ref 34–46.6)
HGB BLD-MCNC: 8.7 G/DL (ref 12–15.9)
MCH RBC QN AUTO: 29.7 PG (ref 26.6–33)
MCHC RBC AUTO-ENTMCNC: 33.5 G/DL (ref 31.5–35.7)
MCV RBC AUTO: 88.7 FL (ref 79–97)
PLATELET # BLD AUTO: 418 10*3/MM3 (ref 140–450)
PMV BLD AUTO: 10 FL (ref 6–12)
POTASSIUM SERPL-SCNC: 3.4 MMOL/L (ref 3.5–5.2)
RBC # BLD AUTO: 2.93 10*6/MM3 (ref 3.77–5.28)
SODIUM SERPL-SCNC: 138 MMOL/L (ref 136–145)
WBC NRBC COR # BLD AUTO: 10.51 10*3/MM3 (ref 3.4–10.8)

## 2024-01-28 PROCEDURE — 25010000002 HEPARIN (PORCINE) PER 1000 UNITS: Performed by: INTERNAL MEDICINE

## 2024-01-28 PROCEDURE — 80048 BASIC METABOLIC PNL TOTAL CA: CPT | Performed by: INTERNAL MEDICINE

## 2024-01-28 PROCEDURE — 85027 COMPLETE CBC AUTOMATED: CPT | Performed by: INTERNAL MEDICINE

## 2024-01-28 PROCEDURE — 99232 SBSQ HOSP IP/OBS MODERATE 35: CPT | Performed by: INTERNAL MEDICINE

## 2024-01-28 PROCEDURE — 82948 REAGENT STRIP/BLOOD GLUCOSE: CPT

## 2024-01-28 PROCEDURE — 25810000003 SODIUM CHLORIDE 0.9 % SOLUTION: Performed by: INTERNAL MEDICINE

## 2024-01-28 PROCEDURE — 99232 SBSQ HOSP IP/OBS MODERATE 35: CPT | Performed by: NURSE PRACTITIONER

## 2024-01-28 PROCEDURE — 25010000002 CEFTRIAXONE PER 250 MG: Performed by: NURSE PRACTITIONER

## 2024-01-28 RX ORDER — HYDROXYZINE HYDROCHLORIDE 25 MG/1
25 TABLET, FILM COATED ORAL ONCE
Status: COMPLETED | OUTPATIENT
Start: 2024-01-28 | End: 2024-01-28

## 2024-01-28 RX ADMIN — MEGESTROL ACETATE 200 MG: 40 SUSPENSION ORAL at 08:18

## 2024-01-28 RX ADMIN — LORAZEPAM 2 MG: 2 TABLET ORAL at 20:57

## 2024-01-28 RX ADMIN — Medication 5 MG: at 23:32

## 2024-01-28 RX ADMIN — DOCUSATE SODIUM 50 MG AND SENNOSIDES 8.6 MG 2 TABLET: 8.6; 5 TABLET, FILM COATED ORAL at 20:57

## 2024-01-28 RX ADMIN — HEPARIN SODIUM 5000 UNITS: 5000 INJECTION INTRAVENOUS; SUBCUTANEOUS at 21:50

## 2024-01-28 RX ADMIN — MEGESTROL ACETATE 200 MG: 40 SUSPENSION ORAL at 14:05

## 2024-01-28 RX ADMIN — Medication 10 ML: at 20:56

## 2024-01-28 RX ADMIN — ACETAMINOPHEN 650 MG: 325 TABLET ORAL at 05:38

## 2024-01-28 RX ADMIN — SODIUM CHLORIDE 100 ML/HR: 9 INJECTION, SOLUTION INTRAVENOUS at 07:38

## 2024-01-28 RX ADMIN — Medication 10 ML: at 08:24

## 2024-01-28 RX ADMIN — ACETAMINOPHEN 650 MG: 325 TABLET ORAL at 21:50

## 2024-01-28 RX ADMIN — SODIUM CHLORIDE 2000 MG: 9 INJECTION, SOLUTION INTRAVENOUS at 12:27

## 2024-01-28 RX ADMIN — PANTOPRAZOLE SODIUM 40 MG: 40 TABLET, DELAYED RELEASE ORAL at 05:38

## 2024-01-28 RX ADMIN — DOCUSATE SODIUM 50 MG AND SENNOSIDES 8.6 MG 2 TABLET: 8.6; 5 TABLET, FILM COATED ORAL at 08:18

## 2024-01-28 RX ADMIN — MEGESTROL ACETATE 200 MG: 40 SUSPENSION ORAL at 20:56

## 2024-01-28 RX ADMIN — ATORVASTATIN CALCIUM 20 MG: 20 TABLET, FILM COATED ORAL at 08:18

## 2024-01-28 RX ADMIN — GABAPENTIN 400 MG: 400 CAPSULE ORAL at 20:57

## 2024-01-28 RX ADMIN — HYDROXYZINE HYDROCHLORIDE 25 MG: 25 TABLET, FILM COATED ORAL at 21:50

## 2024-01-28 RX ADMIN — HEPARIN SODIUM 5000 UNITS: 5000 INJECTION INTRAVENOUS; SUBCUTANEOUS at 08:18

## 2024-01-28 RX ADMIN — BUPROPION HYDROCHLORIDE 150 MG: 150 TABLET, EXTENDED RELEASE ORAL at 08:18

## 2024-01-28 NOTE — PLAN OF CARE
Goal Outcome Evaluation:         Pt resting in bed, family at bedside throughout shift, no complaints from pt, will continue plan of care.

## 2024-01-28 NOTE — PLAN OF CARE
Goal Outcome Evaluation:      Pt has rested in bed overnight with family at bedside. VSS. No acute changes. Will continue POC.

## 2024-01-28 NOTE — PROGRESS NOTES
PROGRESS NOTE         Patient Identification:  Name:  Leonie Laws  Age:  83 y.o.  Sex:  female  :  1940  MRN:  [id number]  Visit Number:  [id number]  Primary Care Provider:  Joe Bowden MD         LOS: 6 days       ----------------------------------------------------------------------------------------------------------------------  Subjective       Chief Complaints:    Vomiting and Weakness - Generalized        Interval History:      The patient is sleepy, easily arousable.  Resting on room air with no apparent distress.  Afebrile.  Denies diarrhea.  Lung exam is clear to auscultation bilaterally.  Abdomen soft and nontender with normoactive bowel sounds.  WBC improved to 10.51.  Blood cultures from 2024 preliminarily report no growth at 3 days.      Review of Systems:    Constitutional: no fever, chills and night sweats.  Generalized fatigue.  Eyes: no eye drainage, itching or redness.  HEENT: no mouth sores, dysphagia or nose bleed.  Respiratory: no for shortness of breath, cough or production of sputum.  Cardiovascular: no chest pain, no palpitations, no orthopnea.  Gastrointestinal: no nausea, vomiting or diarrhea. No abdominal pain, hematemesis or rectal bleeding.  Genitourinary: no dysuria or polyuria.  Hematologic/lymphatic: no lymph node abnormalities, no easy bruising or easy bleeding.  Musculoskeletal: no muscle or joint pain.  Skin: No rash and no itching.  Neurological: no loss of consciousness, no seizure, no headache.  Behavioral/Psych: no depression or suicidal ideation.  Endocrine: no hot flashes.  Immunologic: negative.    ----------------------------------------------------------------------------------------------------------------------      Objective       [facility] Meds:  atorvastatin, 20 mg, Oral, Daily  buPROPion XL, 150 mg, Oral, Daily  cefTRIAXone, 2,000 mg, Intravenous, Q24H  gabapentin, 400 mg, Oral, Nightly  heparin (porcine), 5,000 Units,  Subcutaneous, Q12H  insulin lispro, 2-7 Units, Subcutaneous, 4x Daily AC & at Bedtime  LORazepam, 2 mg, Oral, Nightly  megestrol, 200 mg, Oral, TID  pantoprazole, 40 mg, Oral, Q AM  senna-docusate sodium, 2 tablet, Oral, BID  sodium chloride, 10 mL, Intravenous, Q12H           ----------------------------------------------------------------------------------------------------------------------    Vital Signs:  Temp:  [98.2 °F (36.8 °C)-98.6 °F (37 °C)] 98.4 °F (36.9 °C)  Heart Rate:  [84-95] 92  Resp:  [18] 18  BP: (112-128)/(57-66) 112/61  Mean Arterial Pressure (Non-Invasive) for the past 24 hrs (Last 3 readings):   Noninvasive MAP (mmHg)   01/28/24 1000 77   01/28/24 0600 83     SpO2 Percentage    01/28/24 0300 01/28/24 0600 01/28/24 1000   SpO2: 96% 96% 97%     SpO2:  [96 %-97 %] 97 %  on   ;   Device (Oxygen Therapy): room air    Body mass index is 18.98 kg/m².  Wt Readings from Last 3 Encounters:   01/22/24 56.6 kg (124 lb 12.8 oz)   06/25/20 66.5 kg (146 lb 9.6 oz)   05/04/20 70.1 kg (154 lb 9.6 oz)        Intake/Output Summary (Last 24 hours) at 1/28/2024 1135  Last data filed at 1/28/2024 0851  Gross per 24 hour   Intake 440 ml   Output 700 ml   Net -260 ml     Diet: Regular/House Diet; Feeding Assistance - Nursing; Texture: Soft to Chew (NDD 3); Soft to Chew: Chopped Meat; Fluid Consistency: Thin (IDDSI 0)  ----------------------------------------------------------------------------------------------------------------------      Physical Exam:    Constitutional: Elderly and chronically ill-appearing.  Resting comfortably in bed, easily arousable.  On room air with no apparent distress.    HENT:  Head: Normocephalic and atraumatic.  Mouth:  Moist mucous membranes.    Eyes:  Conjunctivae and EOM are normal.  No scleral icterus.  Neck:  Neck supple.  No JVD present.    Cardiovascular:  Normal rate, regular rhythm and normal heart sounds with no murmur. No edema.  Pulmonary/Chest:  No respiratory distress, no  wheezes, no crackles, with normal breath sounds and good air movement.  On room air.    Abdominal:  Soft.  Bowel sounds are normal.  No distension and no tenderness.   Musculoskeletal:  No edema, no tenderness, and no deformity.  No swelling or redness of joints.  Neurological:  Alert and oriented to person, slightly confused, sleepy  Skin:  Skin is warm and dry.  No rash noted.  No pallor.           ----------------------------------------------------------------------------------------------------------------------  Results from last 7 days   Lab Units 01/22/24  1534   HSTROP T ng/L <6           Results from last 7 days   Lab Units 01/28/24 0304 01/27/24 0204 01/26/24  0703 01/23/24  0509 01/22/24  1930 01/22/24  1542 01/22/24  1534   CRP mg/dL  --   --   --   --   --   --  25.25*   LACTATE mmol/L  --   --   --   --  1.8 2.2*  --    WBC 10*3/mm3 10.51 12.30* 9.80   < >  --   --  18.78*   HEMOGLOBIN g/dL 8.7* 8.9* 9.5*   < >  --   --  12.6   HEMATOCRIT % 26.0* 26.9* 31.3*   < >  --   --  38.4   MCV fL 88.7 88.2 96.9   < >  --   --  90.1   MCHC g/dL 33.5 33.1 30.4*   < >  --   --  32.8   PLATELETS 10*3/mm3 418 381 288   < >  --   --  399    < > = values in this interval not displayed.     Results from last 7 days   Lab Units 01/28/24 0304 01/27/24 0204 01/26/24  0703 01/24/24  0449 01/23/24  0509 01/22/24  1534   SODIUM mmol/L 138 135* 137   < > 130* 127*   POTASSIUM mmol/L 3.4* 3.0* 3.4*   < > 3.9 4.6   MAGNESIUM mg/dL  --  1.9  --   --   --  2.0  2.0   CHLORIDE mmol/L 107 104 107   < > 97* 89*   CO2 mmol/L 23.4 24.3 22.2   < > 25.7 23.9   BUN mg/dL 5* 6* 5*   < > 17 16   CREATININE mg/dL 0.62 0.62 0.58   < > 0.85 0.95   CALCIUM mg/dL 8.1* 8.1* 8.1*   < > 8.7 9.7   GLUCOSE mg/dL 135* 122* 121*   < > 138* 233*   ALBUMIN g/dL  --   --   --   --  2.3* 2.7*   BILIRUBIN mg/dL  --   --   --   --  0.3 0.6   ALK PHOS U/L  --   --   --   --  140* 118*   AST (SGOT) U/L  --   --   --   --  36* 38*   ALT (SGPT) U/L  --    "--   --   --  28 32    < > = values in this interval not displayed.   Estimated Creatinine Clearance: 62.5 mL/min (by C-G formula based on SCr of 0.62 mg/dL).  No results found for: \"AMMONIA\"    Glucose   Date/Time Value Ref Range Status   01/28/2024 1057 148 (H) 70 - 130 mg/dL Final   01/28/2024 0700 110 70 - 130 mg/dL Final   01/27/2024 2011 172 (H) 70 - 130 mg/dL Final   01/27/2024 1609 94 70 - 130 mg/dL Final   01/27/2024 1028 152 (H) 70 - 130 mg/dL Final   01/27/2024 0633 108 70 - 130 mg/dL Final   01/26/2024 1940 184 (H) 70 - 130 mg/dL Final   01/26/2024 1614 143 (H) 70 - 130 mg/dL Final     Lab Results   Component Value Date    HGBA1C 5.90 (H) 01/22/2024     Lab Results   Component Value Date    TSH 0.897 01/22/2024    FREET4 1.46 12/22/2016       Blood Culture   Date Value Ref Range Status   01/22/2024 Streptococcus pyogenes (Group A) (C)  Preliminary   01/22/2024 Streptococcus pyogenes (Group A) (C)  Final     No results found for: \"URINECX\"  No results found for: \"WOUNDCX\"  No results found for: \"STOOLCX\"  No results found for: \"RESPCX\"  Pain Management Panel           No data to display                  ----------------------------------------------------------------------------------------------------------------------  Imaging Results (Last 24 Hours)       ** No results found for the last 24 hours. **            ----------------------------------------------------------------------------------------------------------------------    Pertinent Infectious Disease Results      Assessment/Plan       Assessment     Severe sepsis with lactic acid greater than 2 on admission  Streptococcus pyogenes bacteremia        Plan      The patient is sleepy, easily arousable.  Resting on room air with no apparent distress.  Afebrile.  Denies diarrhea.  Lung exam is clear to auscultation bilaterally.  Abdomen soft and nontender with normoactive bowel sounds.  WBC improved to 10.51.  Blood cultures from 1/25/2024 " preliminarily report no growth at 3 days.    Recommend to continue ceftriaxone 2 g IV every 24 hours for Streptococcus pyogenes bacteremia.  Will require 2 weeks of antibiotic therapy from first negative blood cultures, tentatively 2/8/2024.  We will continue to monitor closely and plan to adjust antibiotic coverage as appropriate.      ANTIMICROBIAL THERAPY    cefTRIAXone (ROCEPHIN) 2000 mg IVPB in 100 mL NS (VTB)     Code Status:   Code Status and Medical Interventions:   Ordered at: 01/22/24 2049     Medical Intervention Limits:    NO intubation (DNI)     Code Status (Patient has no pulse and is not breathing):    No CPR (Do Not Attempt to Resuscitate)     Medical Interventions (Patient has pulse or is breathing):    Limited Support       Liana Snyder, APRN  01/28/24  11:37 EST

## 2024-01-28 NOTE — PROGRESS NOTES
[facility]     Patient Identification:  Name:  Leonie Laws  Age:  83 y.o.  Sex:  female  :  1940  MRN:  [id number]  Visit Number:  [id number]  Primary Care Provider:  Joe Bowden MD    Length of stay:  6    Chief complaint: None    Subjective:    Patient was asleep when I entered the room but easily awakened.  Patient has no new complaints today.  She appears to be in good spirits but still has baseline mild confusion.  Per nursing staff, no new events overnight.  ----------------------------------------------------------------------------------------------------------------------  Current Hospital Meds:  atorvastatin, 20 mg, Oral, Daily  buPROPion XL, 150 mg, Oral, Daily  cefTRIAXone, 2,000 mg, Intravenous, Q24H  gabapentin, 400 mg, Oral, Nightly  heparin (porcine), 5,000 Units, Subcutaneous, Q12H  insulin lispro, 2-7 Units, Subcutaneous, 4x Daily AC & at Bedtime  LORazepam, 2 mg, Oral, Nightly  megestrol, 200 mg, Oral, TID  pantoprazole, 40 mg, Oral, Q AM  senna-docusate sodium, 2 tablet, Oral, BID  sodium chloride, 10 mL, Intravenous, Q12H      sodium chloride, 100 mL/hr, Last Rate: 100 mL/hr (24 0738)      ----------------------------------------------------------------------------------------------------------------------  Vital Signs:  Temp:  [98.2 °F (36.8 °C)-98.6 °F (37 °C)] 98.4 °F (36.9 °C)  Heart Rate:  [84-95] 92  Resp:  [18] 18  BP: (112-128)/(57-66) 112/61      24  1515 24  2315   Weight: 58.1 kg (128 lb) 56.6 kg (124 lb 12.8 oz)     Body mass index is 18.98 kg/m².    Intake/Output Summary (Last 24 hours) at 2024 1050  Last data filed at 2024 0851  Gross per 24 hour   Intake 440 ml   Output 700 ml   Net -260 ml     Diet: Regular/House Diet; Feeding Assistance - Nursing; Texture: Soft to Chew (NDD 3); Soft to Chew: Chopped Meat; Fluid Consistency: Thin (IDDSI  0)  ----------------------------------------------------------------------------------------------------------------------  Physical exam:  Constitutional: Well-nourished  female in no apparent distress.     HENT:  Head:  Normocephalic and atraumatic.  Mouth:  Moist mucous membranes.    Eyes:  Conjunctivae and EOM are normal.  Pupils are equal, round, and reactive to light.  No scleral icterus.    Neck:  Neck supple. No thyromegaly.  No JVD present.    Cardiovascular:  Regular rate and rhythm with no murmurs, rubs, clicks or gallops appreciated.  Pulmonary/Chest:  Clear to auscultation bilaterally with no crackles, wheezes or rhonchi appreciated.  Abdominal:  Soft. Nontender. Nondistended  Bowel sounds are normal in all four quadrants. No organomegally appreciated.   Musculoskeletal:  No edema, no tenderness, and no deformity.  No red or swollen joints anywhere.    Neurological:  Alert, Cranial nerves II-XII intact with no focal deficits.  No facial droop.  No slurred speech.   Skin:  Warm and dry to palpation with no rashes or lesions appreciated.  Peripheral vascular:  2+ radial and pedal pulses in bilateral upper and lower extremities.  Psychiatric:  Alert and oriented to immediate surroundings, but is not oriented to person, place or time.    No significant change in physical exam in comparison to 1/27/2024  ------------------------------------------------------------------------------------------------------------  ----------------------------------------------------------------------------------------------------------------------  Results from last 7 days   Lab Units 01/22/24  1534   HSTROP T ng/L <6     Results from last 7 days   Lab Units 01/28/24  0304 01/27/24  0204 01/26/24  0703 01/23/24  0509 01/22/24  1930 01/22/24  1542 01/22/24  1534   CRP mg/dL  --   --   --   --   --   --  25.25*   LACTATE mmol/L  --   --   --   --  1.8 2.2*  --    WBC 10*3/mm3 10.51 12.30* 9.80   < >  --   --  18.78*  "  HEMOGLOBIN g/dL 8.7* 8.9* 9.5*   < >  --   --  12.6   HEMATOCRIT % 26.0* 26.9* 31.3*   < >  --   --  38.4   MCV fL 88.7 88.2 96.9   < >  --   --  90.1   MCHC g/dL 33.5 33.1 30.4*   < >  --   --  32.8   PLATELETS 10*3/mm3 418 381 288   < >  --   --  399    < > = values in this interval not displayed.         Results from last 7 days   Lab Units 01/28/24  0304 01/27/24  0204 01/26/24  0703 01/24/24  0449 01/23/24  0509 01/22/24  1534   SODIUM mmol/L 138 135* 137   < > 130* 127*   POTASSIUM mmol/L 3.4* 3.0* 3.4*   < > 3.9 4.6   MAGNESIUM mg/dL  --  1.9  --   --   --  2.0  2.0   CHLORIDE mmol/L 107 104 107   < > 97* 89*   CO2 mmol/L 23.4 24.3 22.2   < > 25.7 23.9   BUN mg/dL 5* 6* 5*   < > 17 16   CREATININE mg/dL 0.62 0.62 0.58   < > 0.85 0.95   CALCIUM mg/dL 8.1* 8.1* 8.1*   < > 8.7 9.7   GLUCOSE mg/dL 135* 122* 121*   < > 138* 233*   ALBUMIN g/dL  --   --   --   --  2.3* 2.7*   BILIRUBIN mg/dL  --   --   --   --  0.3 0.6   ALK PHOS U/L  --   --   --   --  140* 118*   AST (SGOT) U/L  --   --   --   --  36* 38*   ALT (SGPT) U/L  --   --   --   --  28 32    < > = values in this interval not displayed.   Estimated Creatinine Clearance: 62.5 mL/min (by C-G formula based on SCr of 0.62 mg/dL).    No results found for: \"AMMONIA\"      Blood Culture   Date Value Ref Range Status   01/22/2024 Abnormal Stain (C)  Preliminary   01/22/2024 Abnormal Stain (C)  Preliminary     No results found for: \"URINECX\"  No results found for: \"WOUNDCX\"  No results found for: \"STOOLCX\"    I have personally looked at the labs and they are summarized above.  ----------------------------------------------------------------------------------------------------------------------  Imaging Results (Last 24 Hours)       ** No results found for the last 24 hours. **          ----------------------------------------------------------------------------------------------------------------------  Assessment and Plan:    Aspiration pneumonia/pneumonitis - " Appreciate ID recommendations, continue Rocephin    2.  Dementia - Patient's son is in the process of obtaining emergency guardianship    3.  Strep pyogenes bacteremia -will continue Rocephin 2 g IV every 24 hours for now.  Patient will require 2 weeks of antibiotic therapy to be completed 2/8/2024.  Awaiting infectious disease final decision on continuing IV antibiotics versus transition to oral    4.  General debility -family is requesting inpatient rehab or skilled nursing facility for continued PT/OT.        Disposition will likely require several more days hospitalization    Eulalio Clarke,    01/28/24 10:50 EST

## 2024-01-29 LAB
ANION GAP SERPL CALCULATED.3IONS-SCNC: 8.9 MMOL/L (ref 5–15)
BUN SERPL-MCNC: 5 MG/DL (ref 8–23)
BUN/CREAT SERPL: 7.8 (ref 7–25)
CALCIUM SPEC-SCNC: 8.6 MG/DL (ref 8.6–10.5)
CHLORIDE SERPL-SCNC: 105 MMOL/L (ref 98–107)
CO2 SERPL-SCNC: 24.1 MMOL/L (ref 22–29)
CREAT SERPL-MCNC: 0.64 MG/DL (ref 0.57–1)
DEPRECATED RDW RBC AUTO: 49.2 FL (ref 37–54)
EGFRCR SERPLBLD CKD-EPI 2021: 88.4 ML/MIN/1.73
ERYTHROCYTE [DISTWIDTH] IN BLOOD BY AUTOMATED COUNT: 15.5 % (ref 12.3–15.4)
GLUCOSE BLDC GLUCOMTR-MCNC: 104 MG/DL (ref 70–130)
GLUCOSE BLDC GLUCOMTR-MCNC: 146 MG/DL (ref 70–130)
GLUCOSE BLDC GLUCOMTR-MCNC: 152 MG/DL (ref 70–130)
GLUCOSE BLDC GLUCOMTR-MCNC: 84 MG/DL (ref 70–130)
GLUCOSE SERPL-MCNC: 104 MG/DL (ref 65–99)
HCT VFR BLD AUTO: 28.4 % (ref 34–46.6)
HGB BLD-MCNC: 9.6 G/DL (ref 12–15.9)
MCH RBC QN AUTO: 29.5 PG (ref 26.6–33)
MCHC RBC AUTO-ENTMCNC: 33.8 G/DL (ref 31.5–35.7)
MCV RBC AUTO: 87.4 FL (ref 79–97)
PLATELET # BLD AUTO: 504 10*3/MM3 (ref 140–450)
PMV BLD AUTO: 10.2 FL (ref 6–12)
POTASSIUM SERPL-SCNC: 3.8 MMOL/L (ref 3.5–5.2)
RBC # BLD AUTO: 3.25 10*6/MM3 (ref 3.77–5.28)
SODIUM SERPL-SCNC: 138 MMOL/L (ref 136–145)
WBC NRBC COR # BLD AUTO: 10.59 10*3/MM3 (ref 3.4–10.8)

## 2024-01-29 PROCEDURE — 99232 SBSQ HOSP IP/OBS MODERATE 35: CPT | Performed by: INTERNAL MEDICINE

## 2024-01-29 PROCEDURE — 25010000002 HEPARIN (PORCINE) PER 1000 UNITS: Performed by: INTERNAL MEDICINE

## 2024-01-29 PROCEDURE — 97535 SELF CARE MNGMENT TRAINING: CPT

## 2024-01-29 PROCEDURE — 85027 COMPLETE CBC AUTOMATED: CPT | Performed by: INTERNAL MEDICINE

## 2024-01-29 PROCEDURE — 25010000002 CEFTRIAXONE PER 250 MG: Performed by: NURSE PRACTITIONER

## 2024-01-29 PROCEDURE — 80048 BASIC METABOLIC PNL TOTAL CA: CPT | Performed by: INTERNAL MEDICINE

## 2024-01-29 PROCEDURE — 97530 THERAPEUTIC ACTIVITIES: CPT

## 2024-01-29 PROCEDURE — 82948 REAGENT STRIP/BLOOD GLUCOSE: CPT

## 2024-01-29 PROCEDURE — 63710000001 INSULIN LISPRO (HUMAN) PER 5 UNITS: Performed by: INTERNAL MEDICINE

## 2024-01-29 PROCEDURE — 25010000002 DIAZEPAM PER 5 MG

## 2024-01-29 RX ORDER — DIAZEPAM 5 MG/ML
5 INJECTION, SOLUTION INTRAMUSCULAR; INTRAVENOUS EVERY 4 HOURS PRN
Status: DISCONTINUED | OUTPATIENT
Start: 2024-01-29 | End: 2024-01-31 | Stop reason: HOSPADM

## 2024-01-29 RX ADMIN — PANTOPRAZOLE SODIUM 40 MG: 40 TABLET, DELAYED RELEASE ORAL at 05:40

## 2024-01-29 RX ADMIN — DOCUSATE SODIUM 50 MG AND SENNOSIDES 8.6 MG 2 TABLET: 8.6; 5 TABLET, FILM COATED ORAL at 20:24

## 2024-01-29 RX ADMIN — INSULIN LISPRO 2 UNITS: 100 INJECTION, SOLUTION INTRAVENOUS; SUBCUTANEOUS at 20:31

## 2024-01-29 RX ADMIN — ACETAMINOPHEN 650 MG: 325 TABLET ORAL at 08:13

## 2024-01-29 RX ADMIN — MEGESTROL ACETATE 200 MG: 40 SUSPENSION ORAL at 20:32

## 2024-01-29 RX ADMIN — Medication 10 ML: at 20:25

## 2024-01-29 RX ADMIN — GABAPENTIN 400 MG: 400 CAPSULE ORAL at 20:24

## 2024-01-29 RX ADMIN — SODIUM CHLORIDE 2000 MG: 9 INJECTION, SOLUTION INTRAVENOUS at 13:18

## 2024-01-29 RX ADMIN — MEGESTROL ACETATE 200 MG: 40 SUSPENSION ORAL at 08:14

## 2024-01-29 RX ADMIN — BUPROPION HYDROCHLORIDE 150 MG: 150 TABLET, EXTENDED RELEASE ORAL at 08:14

## 2024-01-29 RX ADMIN — HEPARIN SODIUM 5000 UNITS: 5000 INJECTION INTRAVENOUS; SUBCUTANEOUS at 20:23

## 2024-01-29 RX ADMIN — Medication 10 ML: at 08:18

## 2024-01-29 RX ADMIN — ACETAMINOPHEN 650 MG: 325 TABLET ORAL at 22:53

## 2024-01-29 RX ADMIN — MEGESTROL ACETATE 200 MG: 40 SUSPENSION ORAL at 15:22

## 2024-01-29 RX ADMIN — LORAZEPAM 2 MG: 2 TABLET ORAL at 20:24

## 2024-01-29 RX ADMIN — ATORVASTATIN CALCIUM 20 MG: 20 TABLET, FILM COATED ORAL at 08:14

## 2024-01-29 RX ADMIN — DIAZEPAM 5 MG: 5 INJECTION, SOLUTION INTRAMUSCULAR; INTRAVENOUS at 00:51

## 2024-01-29 RX ADMIN — DOCUSATE SODIUM 50 MG AND SENNOSIDES 8.6 MG 2 TABLET: 8.6; 5 TABLET, FILM COATED ORAL at 08:14

## 2024-01-29 RX ADMIN — HEPARIN SODIUM 5000 UNITS: 5000 INJECTION INTRAVENOUS; SUBCUTANEOUS at 08:13

## 2024-01-29 RX ADMIN — Medication 5 MG: at 20:24

## 2024-01-29 NOTE — THERAPY TREATMENT NOTE
Acute Care - Occupational Therapy Treatment Note   [facility]     Patient Name: Leonie Laws  : 1940  MRN: [id number]  Today's Date: 2024  Onset of Illness/Injury or Date of Surgery: 24 (admission date)          Admit Date: 2024       ICD-10-CM ICD-9-CM   1. Nausea and vomiting, unspecified vomiting type  R11.2 787.01   2. Dehydration  E86.0 276.51   3. Debility  R53.81 799.3     Patient Active Problem List   Diagnosis    Cystocele    Diverticulosis of large intestine    Hemorrhoids    Hyperlipidemia    Postoperative hypothyroidism    Impaired fasting glucose    Osteoporosis    Rectocele    Trigeminal neuralgia    Anxiety    Vomiting     Past Medical History:   Diagnosis Date    Anxiety     Hyperlipidemia     Trigeminal neuralgia      Past Surgical History:   Procedure Laterality Date    BREAST SURGERY      HYSTERECTOMY           OT ASSESSMENT FLOWSHEET (last 12 hours)       OT Evaluation and Treatment       Row Name 24 1455                   OT Time and Intention    Document Type therapy note (daily note)  -KR        Mode of Treatment occupational therapy  -KR        Patient Effort adequate  -KR        Symptoms Noted During/After Treatment fatigue  -KR           Living Environment    Current Living Arrangements home  -KR        People in Home spouse  -KR           Bed Mobility    Bed Mobility rolling left;supine-sit  -KR        Rolling Left [place] (Bed Mobility) maximum assist (25% patient effort)  -KR        Supine-Sit [place] (Bed Mobility) maximum assist (25% patient effort)  -KR           Bed-Chair Transfer    Bed-Chair [place] (Transfers) moderate assist (50% patient effort);verbal cues  -KR           Motor Skills    Therapeutic Exercise shoulder;elbow/forearm;hand  -KR           Shoulder (Therapeutic Exercise)    Shoulder (Therapeutic Exercise) AROM (active range of motion)  -KR        Shoulder AROM (Therapeutic Exercise) bilateral;flexion;extension;supine  -KR            Elbow/Forearm (Therapeutic Exercise)    Elbow/Forearm (Therapeutic Exercise) AROM (active range of motion)  -KR        Elbow/Forearm AROM (Therapeutic Exercise) bilateral;flexion;extension;supine  -KR           Hand (Therapeutic Exercise)    Hand (Therapeutic Exercise) AROM (active range of motion)  -KR        Hand AROM/AAROM (Therapeutic Exercise) bilateral;finger flexion;finger extension  -KR           Plan of Care Review    Plan of Care Reviewed With patient;family  -           Therapy Assessment/Plan (OT)    Rehab Potential (OT) good, to achieve stated therapy goals  -KR        Planned Therapy Interventions (OT) activity tolerance training;strengthening exercise;BADL retraining;transfer/mobility retraining  -           Progress Summary (OT)    Progress Toward Functional Goals (OT) progress toward functional goals as expected  -           Therapy Plan Review/Discharge Plan (OT)    Anticipated Discharge Disposition (OT) inpatient rehabilitation facility;home with assist  -                  User Key  (r) = Recorded By, (t) = Taken By, (c) = Cosigned By      Initials Name Effective Dates     Fabrizio Ramey OT 06/16/21 -                            OT Recommendation and Plan  Planned Therapy Interventions (OT): activity tolerance training, strengthening exercise, BADL retraining, transfer/mobility retraining  Progress Toward Functional Goals (OT): progress toward functional goals as expected  Plan of Care Review  Plan of Care Reviewed With: patient, family  Plan of Care Reviewed With: patient, family        Time Calculation:     Therapy Charges for Today       Code Description Service Date Service Provider Modifiers Qty    86334917977  OT THERAPEUTIC ACT EA 15 MIN 1/29/2024 Fabrizio Ramey OT GO 1    [id number]  OT SELF CARE/MGMT/TRAIN EA 15 MIN 1/29/2024 Fabrizio Ramey OT GO 2                 Fabrizio Ramey OT  1/29/2024

## 2024-01-29 NOTE — THERAPY TREATMENT NOTE
Acute Care - Physical Therapy Treatment Note   [facility]     Patient Name: Leonie Laws  : 1940  MRN: [id number]  Today's Date: 2024   Onset of Illness/Injury or Date of Surgery: 24 (admission date)  Visit Dx:     ICD-10-CM ICD-9-CM   1. Nausea and vomiting, unspecified vomiting type  R11.2 787.01   2. Dehydration  E86.0 276.51   3. Debility  R53.81 799.3     Patient Active Problem List   Diagnosis    Cystocele    Diverticulosis of large intestine    Hemorrhoids    Hyperlipidemia    Postoperative hypothyroidism    Impaired fasting glucose    Osteoporosis    Rectocele    Trigeminal neuralgia    Anxiety    Vomiting     Past Medical History:   Diagnosis Date    Anxiety     Hyperlipidemia     Trigeminal neuralgia      Past Surgical History:   Procedure Laterality Date    BREAST SURGERY      HYSTERECTOMY       PT Assessment (last 12 hours)       PT Evaluation and Treatment       Row Name 24 1400          Physical Therapy Time and Intention    Subjective Information complains of;nausea/vomiting;weakness  -     Document Type therapy note (daily note)  -     Mode of Treatment physical therapy  -     Patient Effort adequate  -       Row Name 24 1400          General Information    Patient Profile Reviewed yes  -     Patient/Family/Caregiver Comments/Observations Family agreeable for therapy to attempt treatment  -     Existing Precautions/Restrictions fall  -     Limitations/Impairments safety/cognitive  -       Row Name 24 1400          Cognition    Affect/Mental Status (Cognition) confused  -     Orientation Status (Cognition) oriented to;person  -     Follows Commands (Cognition) follows one-step commands  -     Personal Safety Interventions fall prevention program maintained;muscle strengthening facilitated;nonskid shoes/slippers when out of bed;supervised activity  -       Row Name 24 1400          Bed Mobility    Supine-Sit [place] (Bed Mobility)  dependent (less than 25% patient effort);2 person assist;maximum assist (25% patient effort)  -     Bed Mobility, Safety Issues decreased use of arms for pushing/pulling;decreased use of legs for bridging/pushing;impaired trunk control for bed mobility;cognitive deficits limit understanding  -     Assistive Device (Bed Mobility) head of bed elevated  -       Row Name 01/29/24 1400          Bed-Chair Transfer    Bed-Chair [place] (Transfers) minimum assist (75% patient effort);2 person assist  -     Assistive Device (Bed-Chair Transfers) walker, front-wheeled  -       Row Name 01/29/24 1400          Sit-Stand Transfer    Sit-Stand [place] (Transfers) minimum assist (75% patient effort);2 person assist;verbal cues  -     Assistive Device (Sit-Stand Transfers) walker, front-wheeled;other (see comments)  and with [facility]  -       Row Name 01/29/24 1400          Stand-Sit Transfer    Stand-Sit [place] (Transfers) minimum assist (75% patient effort);2 person assist;verbal cues  -     Assistive Device (Stand-Sit Transfers) walker, front-wheeled;other (see comments)  and with [facility]  -       Row Name 01/29/24 1400          Gait/Stairs (Locomotion)    Comment, (Gait/Stairs) Patient perform stand stepping transfer but did not ambulate a significant distance today.  She needed increased time and maximum encouragement to perform stand step transfer to chair.  -       Row Name 01/29/24 1400          Plan of Care Review    Plan of Care Reviewed With patient  -     Outcome Evaluation PT treatment completed.  Following education and encouragement to participate patient performed bed mobility, transfer training and sitting balance edge of bed and edge of chair.  -       Row Name 01/29/24 1400          Positioning and Restraints    Pre-Treatment Position in bed  -KP     Post Treatment Position chair  -KP     In Chair sitting;with family/caregiver  -               User Key  (r) = Recorded By, (t) =  Taken By, (c) = Cosigned By      Initials Name Provider Type    Richelle Vega PT Physical Therapist                      PT Recommendation and Plan     Plan of Care Reviewed With: patient  Outcome Evaluation: PT treatment completed.  Following education and encouragement to participate patient performed bed mobility, transfer training and sitting balance edge of bed and edge of chair.       Time Calculation:    PT Charges       Row Name 01/29/24 1508             Time Calculation    PT Received On 01/29/24  -      PT Goal Re-Cert Due Date 02/06/24  -         Timed Charges    63890 - PT Therapeutic Activity Minutes 23  -KP         Total Minutes    Timed Charges Total Minutes 23  -KP       Total Minutes 23  -KP                User Key  (r) = Recorded By, (t) = Taken By, (c) = Cosigned By      Initials Name Provider Type    Richelle Vega PT Physical Therapist                  Therapy Charges for Today       Code Description Service Date Service Provider Modifiers Qty    22544149186 HC PT THERAPEUTIC ACT EA 15 MIN 1/29/2024 Richelle Saravia PT GP 2            PT G-Codes  AM-PAC 6 Clicks Score (PT): 8    Richelle Saravia PT  1/29/2024

## 2024-01-29 NOTE — PLAN OF CARE
Goal Outcome Evaluation:      Pt has rested in bed overnight. VSS. Pt remains pleasantly confused but anxious. Pt slept very little despite multiple medications being administered and comfort being provided. Pain well controlled with PRN meds. No acute changes. Will continue POC.

## 2024-01-29 NOTE — PROGRESS NOTES
[facility] HOSPITALIST PROGRESS NOTE     Patient Identification:  Name:  Leonie Laws  Age:  83 y.o.  Sex:  female  :  1940  MRN:  [id number]  Visit Number:  [id number]  ROOM: [address] [place] [address]     Primary Care Provider:  Joe Bowden MD    Length of stay in inpatient status:  7    Subjective     Chief Compliant:    Chief Complaint   Patient presents with    Vomiting    Weakness - Generalized     History of Presenting Illness:    Patient remains ill but stable today, no acute events overnight, no new complaints, denies any fevers or chills, continued on antibiotics, Infectious Disease consulted and following, repeat blood cultures pending currently, tentative plan for antibiotics through , Social Work consulted and following, pending placement.   Objective     Current Hospital Meds:  atorvastatin, 20 mg, Oral, Daily  buPROPion XL, 150 mg, Oral, Daily  cefTRIAXone, 2,000 mg, Intravenous, Q24H  gabapentin, 400 mg, Oral, Nightly  heparin (porcine), 5,000 Units, Subcutaneous, Q12H  insulin lispro, 2-7 Units, Subcutaneous, 4x Daily AC & at Bedtime  LORazepam, 2 mg, Oral, Nightly  megestrol, 200 mg, Oral, TID  pantoprazole, 40 mg, Oral, Q AM  senna-docusate sodium, 2 tablet, Oral, BID  sodium chloride, 10 mL, Intravenous, Q12H         ----------------------------------------------------------------------------------------------------------------------  Vital Signs:  Temp:  [98.2 °F (36.8 °C)-98.7 °F (37.1 °C)] 98.3 °F (36.8 °C)  Heart Rate:  [89-95] 95  Resp:  [18] 18  BP: (112-154)/(61-87) 112/61  SpO2:  [95 %-97 %] 95 %  on   ;   Device (Oxygen Therapy): room air  Body mass index is 18.98 kg/m².      Intake/Output Summary (Last 24 hours) at 2024 1102  Last data filed at 2024 0803  Gross per 24 hour   Intake 800 ml   Output 600 ml   Net 200 ml      ----------------------------------------------------------------------------------------------------------------------  Physical  "exam:  Constitutional:  Elderly, No acute distress.      HENT:  Head:  Normocephalic and atraumatic.  Mouth:  Moist mucous membranes.    Eyes:  Conjunctivae and EOM are normal. No scleral icterus.    Neck:  Neck supple.  No JVD present.    Cardiovascular:  Normal rate, regular rhythm and normal heart sounds with no murmur.  Pulmonary/Chest:  No respiratory distress, no wheezes, no crackles, with normal breath sounds and good air movement.  Abdominal:  Soft. No distension and no tenderness.   Musculoskeletal:  No tenderness, and no deformity.  No red or swollen joints anywhere.    Neurological:  Alert and oriented to person, place.  No cranial nerve deficit.    Skin:  Skin is warm and dry. No rash noted. No pallor.   Peripheral vascular:  No clubbing, no cyanosis, no edema.  Psychiatric: Appropriate mood and affect    Edited by: Agusto Saravia MD at 1/29/2024 1100  ----------------------------------------------------------------------------------------------------------------------  WBC/HGB/HCT/PLT   10.59/9.6/28.4/504 (01/29 0146)  BUN/CREAT/GLUC/ALT/AST/SHANNAN/LIP    5/0.64/104/--/--/--/-- (01/29 0146)  LYTES - Na/K/Cl/CO2: 138/3.8/105/24.1 (01/29 0146)     No results found for: \"URINECX\"  Blood Culture   Date Value Ref Range Status   01/25/2024 No growth at 3 days  Preliminary   01/25/2024 No growth at 3 days  Preliminary   01/22/2024 Streptococcus pyogenes (Group A) (C)  Final   01/22/2024 Streptococcus pyogenes (Group A) (C)  Final     I have personally looked at the labs and they are summarized above.  ----------------------------------------------------------------------------------------------------------------------  Detailed radiology reports for the last 24 hours:  No radiology results for the last day  Assessment & Plan    #Severe Sepsis & due to Pneumonia, Bacterial, treating for aspiration/pneumonitis in setting of nausea/vomiting, though also with noted Strep Pyogenes Bacteremia  - Patient met SIRS " criteria due to heart rate > 90, WBC count > 12K  - Due to lactate > 2 patient met criteria for Severe Sepsis  - Infectious Disease consulted and following   - Continue Ceftriaxone, follow up cultures, repeat set pending  - Will need antibiotics through 2/8 per Infectious Disease   - Continue Tylenol as needed for fevers, monitor on telemetry, monitor for clinical improvement    #Non-Insulin Dependent Diabetes Mellitus Type II, controlled, unknown complications  - Holding home oral agents, continue FSBG and SSI, add long acting as indicated.    #Hypothyroid  - Continue home Levothyroxine    #Anxiety/Depression  - Continue home regimen     #Gastroesophageal Reflux Disease  - Continue home PPI    #Dementia  - No home medications, supportive care    F: Oral  E: Monitor & Replace as needed   N: Diet: Regular/House Diet; Feeding Assistance - Nursing; Texture: Soft to Chew (NDD 3); Soft to Chew: Chopped Meat; Fluid Consistency: Thin (IDDSI 0)   Ppx: SQH   Code Status (Patient has no pulse and is not breathing): No CPR   Medical Interventions (Patient has pulse or is breathing): Limited Support  Medical Intervention Limits: NO intubation (DNI)     Dispo: Pending clinical improvement, aps report open, PT/OT consulted and following, Social Work consulted and following      *This patient is considered high risk due to sepsis, Pneumonia, bacteremia.    Edited by: Agusto Saravia MD at 1/29/2024 1101  [facility]

## 2024-01-29 NOTE — CASE MANAGEMENT/SOCIAL WORK
Discharge Planning Assessment  [facility]     Patient Name: Leonie Laws  MRN: [id number]  Today's Date: 1/29/2024    Admit Date: 1/22/2024    Plan: SS received a consult for family requesting SNF of inpatient rehab placement. SS followed up with pt's son, Yash [phone] on this date. Pt's son states Emergency Guardianship was completed on friday 1/26/24. SS informed pt's son Yash that Emergency guardianship papers will need to be brought and copied to have on file. Pt's son states he has papers in truck and plans to get them and bring to nurses station to make copy today. SS notified Lead RN. SS discussed SNF with pt's son at this time. Pt's son states he has prference of [facility]. SS faxed referral to [facility] on this date. SS notified [facility] per Ruma. SS to follow.       Discharge Plan       Row Name 01/29/24 1312       Plan    Plan SS received a consult for family requesting SNF of inpatient rehab placement. SS followed up with pt's son, Yash [phone] on this date. Pt's son states Emergency Guardianship was completed on friday 1/26/24. SS informed pt's son Yash that Emergency guardianship papers will need to be brought and copied to have on file. Pt's son states he has papers in truck and plans to get them and bring to nurses station to make copy today. SS notified Lead RN. SS discussed SNF with pt's son at this time. Pt's son states he has prference of [facility]. SS faxed referral to [facility] on this date. SS notified [facility] per Ruma. SS to follow.               CALI Fritz

## 2024-01-29 NOTE — PROGRESS NOTES
PROGRESS NOTE         Patient Identification:  Name:  Leonie Laws  Age:  83 y.o.  Sex:  female  :  1940  MRN:  [id number]  Visit Number:  [id number]  Primary Care Provider:  Joe Bowden MD         LOS: 7 days       ----------------------------------------------------------------------------------------------------------------------  Subjective       Chief Complaints:    Vomiting and Weakness - Generalized        Interval History:      The patient is awake and alert, eating breakfast.  On room air with no apparent distress.  Family at the bedside.  Afebrile.  Denies diarrhea.  Lung exam is clear to auscultation bilaterally.  Abdomen is soft and nontender with normoactive bowel sounds.  WBC is normal at 10.59.  Blood cultures from 2024 preliminarily report no growth at 4 days.  The patient reports she is anxious to return home.    Review of Systems:    Constitutional: no fever, chills and night sweats.  Generalized fatigue.  Eyes: no eye drainage, itching or redness.  HEENT: no mouth sores, dysphagia or nose bleed.  Respiratory: no for shortness of breath, cough or production of sputum.  Cardiovascular: no chest pain, no palpitations, no orthopnea.  Gastrointestinal: no nausea, vomiting or diarrhea. No abdominal pain, hematemesis or rectal bleeding.  Genitourinary: no dysuria or polyuria.  Hematologic/lymphatic: no lymph node abnormalities, no easy bruising or easy bleeding.  Musculoskeletal: no muscle or joint pain.  Skin: No rash and no itching.  Neurological: no loss of consciousness, no seizure, no headache.  Behavioral/Psych: no depression or suicidal ideation.  Endocrine: no hot flashes.  Immunologic: negative.    ----------------------------------------------------------------------------------------------------------------------      Objective       [facility] Meds:  atorvastatin, 20 mg, Oral, Daily  buPROPion XL, 150 mg, Oral, Daily  cefTRIAXone, 2,000 mg,  Intravenous, Q24H  gabapentin, 400 mg, Oral, Nightly  heparin (porcine), 5,000 Units, Subcutaneous, Q12H  insulin lispro, 2-7 Units, Subcutaneous, 4x Daily AC & at Bedtime  LORazepam, 2 mg, Oral, Nightly  megestrol, 200 mg, Oral, TID  pantoprazole, 40 mg, Oral, Q AM  senna-docusate sodium, 2 tablet, Oral, BID  sodium chloride, 10 mL, Intravenous, Q12H           ----------------------------------------------------------------------------------------------------------------------    Vital Signs:  Temp:  [98.2 °F (36.8 °C)-98.7 °F (37.1 °C)] 98.3 °F (36.8 °C)  Heart Rate:  [89-95] 95  Resp:  [18] 18  BP: (112-154)/(61-87) 112/61  Mean Arterial Pressure (Non-Invasive) for the past 24 hrs (Last 3 readings):   Noninvasive MAP (mmHg)   01/29/24 1022 80   01/29/24 0636 97   01/28/24 1421 86     SpO2 Percentage    01/28/24 1421 01/29/24 0636 01/29/24 1022   SpO2: 97% 96% 95%     SpO2:  [95 %-97 %] 95 %  on   ;   Device (Oxygen Therapy): room air    Body mass index is 18.98 kg/m².  Wt Readings from Last 3 Encounters:   01/22/24 56.6 kg (124 lb 12.8 oz)   06/25/20 66.5 kg (146 lb 9.6 oz)   05/04/20 70.1 kg (154 lb 9.6 oz)        Intake/Output Summary (Last 24 hours) at 1/29/2024 1138  Last data filed at 1/29/2024 0833  Gross per 24 hour   Intake 800 ml   Output 600 ml   Net 200 ml     Diet: Regular/House Diet; Feeding Assistance - Nursing; Texture: Soft to Chew (NDD 3); Soft to Chew: Chopped Meat; Fluid Consistency: Thin (IDDSI 0)  ----------------------------------------------------------------------------------------------------------------------      Physical Exam:    Constitutional: Elderly and chronically ill-appearing.  Awake and alert, eating breakfast.  Sitting up comfortably in bed on room air with no apparent distress.  Family at the bedside.  HENT:  Head: Normocephalic and atraumatic.  Mouth:  Moist mucous membranes.    Eyes:  Conjunctivae and EOM are normal.  No scleral icterus.  Neck:  Neck supple.  No JVD  present.    Cardiovascular:  Normal rate, regular rhythm and normal heart sounds with no murmur. No edema.  Pulmonary/Chest:  No respiratory distress, no wheezes, no crackles, with normal breath sounds and good air movement.  On room air.    Abdominal:  Soft.  Bowel sounds are normal.  No distension and no tenderness.   Musculoskeletal:  No edema, no tenderness, and no deformity.  No swelling or redness of joints.  Neurological:  Alert and oriented to person and place, slightly confused, sleepy  Skin:  Skin is warm and dry.  No rash noted.  No pallor.           ----------------------------------------------------------------------------------------------------------------------  Results from last 7 days   Lab Units 01/22/24  1534   HSTROP T ng/L <6           Results from last 7 days   Lab Units 01/29/24  0146 01/28/24  0304 01/27/24  0204 01/23/24  0509 01/22/24  1930 01/22/24  1542 01/22/24  1534   CRP mg/dL  --   --   --   --   --   --  25.25*   LACTATE mmol/L  --   --   --   --  1.8 2.2*  --    WBC 10*3/mm3 10.59 10.51 12.30*   < >  --   --  18.78*   HEMOGLOBIN g/dL 9.6* 8.7* 8.9*   < >  --   --  12.6   HEMATOCRIT % 28.4* 26.0* 26.9*   < >  --   --  38.4   MCV fL 87.4 88.7 88.2   < >  --   --  90.1   MCHC g/dL 33.8 33.5 33.1   < >  --   --  32.8   PLATELETS 10*3/mm3 504* 418 381   < >  --   --  399    < > = values in this interval not displayed.     Results from last 7 days   Lab Units 01/29/24  0146 01/28/24  0304 01/27/24  0204 01/24/24  0449 01/23/24  0509 01/22/24  1534   SODIUM mmol/L 138 138 135*   < > 130* 127*   POTASSIUM mmol/L 3.8 3.4* 3.0*   < > 3.9 4.6   MAGNESIUM mg/dL  --   --  1.9  --   --  2.0  2.0   CHLORIDE mmol/L 105 107 104   < > 97* 89*   CO2 mmol/L 24.1 23.4 24.3   < > 25.7 23.9   BUN mg/dL 5* 5* 6*   < > 17 16   CREATININE mg/dL 0.64 0.62 0.62   < > 0.85 0.95   CALCIUM mg/dL 8.6 8.1* 8.1*   < > 8.7 9.7   GLUCOSE mg/dL 104* 135* 122*   < > 138* 233*   ALBUMIN g/dL  --   --   --   --  2.3*  "2.7*   BILIRUBIN mg/dL  --   --   --   --  0.3 0.6   ALK PHOS U/L  --   --   --   --  140* 118*   AST (SGOT) U/L  --   --   --   --  36* 38*   ALT (SGPT) U/L  --   --   --   --  28 32    < > = values in this interval not displayed.   Estimated Creatinine Clearance: 60.6 mL/min (by C-G formula based on SCr of 0.64 mg/dL).  No results found for: \"AMMONIA\"    Glucose   Date/Time Value Ref Range Status   01/29/2024 1043 104 70 - 130 mg/dL Final   01/29/2024 0639 84 70 - 130 mg/dL Final   01/28/2024 2034 123 70 - 130 mg/dL Final   01/28/2024 1621 120 70 - 130 mg/dL Final   01/28/2024 1057 148 (H) 70 - 130 mg/dL Final   01/28/2024 0700 110 70 - 130 mg/dL Final   01/27/2024 2011 172 (H) 70 - 130 mg/dL Final   01/27/2024 1609 94 70 - 130 mg/dL Final     Lab Results   Component Value Date    HGBA1C 5.90 (H) 01/22/2024     Lab Results   Component Value Date    TSH 0.897 01/22/2024    FREET4 1.46 12/22/2016       Blood Culture   Date Value Ref Range Status   01/22/2024 Streptococcus pyogenes (Group A) (C)  Preliminary   01/22/2024 Streptococcus pyogenes (Group A) (C)  Final     No results found for: \"URINECX\"  No results found for: \"WOUNDCX\"  No results found for: \"STOOLCX\"  No results found for: \"RESPCX\"  Pain Management Panel           No data to display                  ----------------------------------------------------------------------------------------------------------------------  Imaging Results (Last 24 Hours)       ** No results found for the last 24 hours. **            ----------------------------------------------------------------------------------------------------------------------    Pertinent Infectious Disease Results      Assessment/Plan       Assessment     Severe sepsis with lactic acid greater than 2 on admission  Streptococcus pyogenes bacteremia        Plan      I saw and examined the patient myself this morning with LEONARDO Davis and discussed the plan of care with her and primary RN and here " are my findings:    The patient is currently awake and alert, actively engaged in eating breakfast, with family members present at the bedside. There is no apparent distress, and the patient is afebrile while denying any issues with diarrhea. The lung examination reveals clear auscultation bilaterally, and the abdomen is soft, nontender, with normoactive bowel sounds. The white blood cell count is within the normal range at 10.59. Preliminary results from blood cultures conducted on 1/25/2024 show no growth at 4 days. The patient expresses eagerness to return home.    It is recommended to maintain the current regimen of ceftriaxone at 2 g IV every 24 hours for Streptococcus pyogenes bacteremia. The patient will require a 14-day antibiotic therapy duration starting from the first negative blood cultures, tentatively concluding on 2/8/2024. Considering the significant clinical improvement, there is a mutual agreement to transition to an oral Omnicef course at the time of discharge, ensuring the completion of the full 14-day course for bacteremia. Follow-up in the outpatient infectious disease clinic is planned to repeat blood cultures x 2 at that time.      ANTIMICROBIAL THERAPY    cefTRIAXone (ROCEPHIN) 2000 mg IVPB in 100 mL NS (VTB)     Code Status:   Code Status and Medical Interventions:   Ordered at: 01/22/24 2049     Medical Intervention Limits:    NO intubation (DNI)     Code Status (Patient has no pulse and is not breathing):    No CPR (Do Not Attempt to Resuscitate)     Medical Interventions (Patient has pulse or is breathing):    Limited Support       LEONARDO Strong  01/29/24  11:38 EST    Electronically signed by LEONARDO Strong, 01/29/24, 11:41 AM EST.    Electronically signed by Jyothi King MD, 01/29/24, 1:00 PM EST.

## 2024-01-29 NOTE — DISCHARGE PLACEMENT REQUEST
"Leonie Laws (83 y.o. Female)       Date of Birth   1940    Social Security Number       Address   [address]  [facility] [address]    Home Phone   [phone]    MRN   [id number]       Buddhism   Unknown    Marital Status                               Admission Date   1/22/24    Admission Type   Emergency    Admitting Provider   Kinjal Beach DO    Attending Provider   Agusto Saravia MD    Department, Room/Bed   [address], [address]/       Discharge Date       Discharge Disposition       Discharge Destination                                 Attending Provider: Agusto Saravia MD    Allergies: Doxycycline, Gentamycin [Gentamicin], Miconazole, Penicillins, Sulfa Antibiotics    Isolation: None   Infection: None   Code Status: No CPR    Ht: 172.7 cm (68\")   Wt: 56.6 kg (124 lb 12.8 oz)    Admission Cmt: None   Principal Problem: Vomiting [R11.10]                   Active Insurance as of 1/22/2024       Primary Coverage       Payor Plan Insurance Group Employer/Plan Group    HUMANA MEDICARE REPLACEMENT HUMANA MED ADV GROUP [id number]       Payor Plan Address Payor Plan Phone Number Payor Plan Fax Number Effective Dates    [address] [address] [phone]  1/1/2015 - None Entered    [facility] [address]         Subscriber Name Subscriber Birth Date Member ID       LEONIE LAWS 1940 [id number]                     Emergency Contacts        (Rel.) Home Phone Work Phone Mobile Phone    Justen Laws (Spouse) [phone] -- --    Giovanny Bond (Son) [phone] -- [phone]    Yash Bond (Son) -- -- [phone]              Insurance Information                  HUMANA MEDICARE REPLACEMENT/HUMANA MED ADV GROUP Phone: [phone]    Subscriber: Leonie Laws SAURABH Subscriber#: [id number]    Group#: [id number] Precert#: [id number]             History & Physical        Khalida Castro PA-C at 01/1940       Attestation signed by Kinjal Beach DO at " 24    I have reviewed this documentation and patient also briefly seen and examined at bedside in .  Patient's , son, daughter-in-law and granddaughter are also present at bedside in addition to GLORIA Shen.    Patient with intermittent episodes of significant right lower extremity tremors versus seizure-like activity.  For this reason, the patient has remained in the ED and is currently connected to [facility] with a 0% seizure burden; will continue for minimum of 1 hours prior to transfer to the floor; d/w GLORIA Shen who verbalized understandin.     Patient does not answer questions at this time.  Patient appears significantly pale but is in no acute distress.  Significant lower extremity edema, no abdominal tenderness palpation, bowel sounds are slightly hypoactive, lungs are diminished at bilateral bases but otherwise clear to auscultation anteriorly.  Heart was regular rate without obvious murmur.    Patient now noted to have a temperature of 101.3 °F which makes aspiration pneumonia versus aspiration pneumonitis higher on the differential in the patient with significant vomiting. Hyponatremia likely hypovolemic and due to decreased PO intake/GI losses. Patient to be started on IV fluids as noted below.    -For now, patient will be continued on empiric antibiotic therapy with vancomycin, Azactam and Flagyl pending final blood culture results  -Supportive care with Tylenol as needed  -Aspiration precautions with bedside nurse dysphagia screen; consider SLP consult  -Patient will be be started on NS @ 100 ml/hr  -PRN IV Zofran  -Check TSH, Mg, Vitamin B12 and folate  -PT and OT consults; CM/ consult for possible placement                           [facility] Medicine Services  HISTORY & PHYSICAL    Patient Identification:  Name:  Leonie Laws  Age:  83 y.o.  Sex:  female  :  1940  MRN:  [id number]   Visit Number:  [id number]  Admit Date: 2024    Primary Care Physician:  Joe Bowden MD     Subjective     Chief complaint:   Chief Complaint   Patient presents with    Vomiting    Weakness - Generalized     History of presenting illness:   Patient is a 83 y.o. female with past medical history significant for anxiety, hyperlipidemia, trigeminal neuralgia, hypothyroidism that presented to the [facility] emergency department for evaluation of generalized weakness.     Patient examined at bedside in ED.  Numerous family members at bedside.  Patient's  states that the patient has been getting progressively weaker over the past several weeks.  Has had poor p.o. intake as well.  He notes that if he was able to get her to eat and drink, then she would because patient seems to be less weak, but he has trouble getting her to comply.  He notes that the nausea and vomiting began yesterday.  Son states patient has been exhibiting signs of dementia for the last couple years, but patient is very offended by any mention of possibly having dementia.  Son states it is difficult to get her to seek care, patient felt so ill that she was going to come to the ER.  Son states that last night she appeared to have aspirated on her vomit and has had an intermittent cough since then.  Mentation appears to be waxing and waning on exam.  Patient was oriented to self, but not location or time.  She was oriented x 3 earlier in the ED.  After my initial exam when I stepped out of the room to speak with family members, son brought to my attention that the patient was having rhythmic jerking of her right lower extremity he states has been intermittently happening over the past hour or so.    Upon arrival to the ED, vitals were temperature 98.4, , RR 14, /75, SpO2 98% on room air.  Labs significant for corrected sodium 130, glucose 233.  Hemoglobin A1c 5.9.  CRP 25.25, lactate 2.2, WBC 18.79 with left shift.  Chest x-ray shows left basilar atelectasis  versus pneumonia.  CT abdomen/pelvis shows no acute identified findings.  Moderate constipation.  Bibasilar airspace disease most consistent with atelectasis.  CT head with no acute findings.    Patient has been admitted to .  ---------------------------------------------------------------------------------------------------------------------   Review of Systems   Unable to perform ROS: Dementia      ---------------------------------------------------------------------------------------------------------------------   Past Medical History:   Diagnosis Date    Anxiety     Hyperlipidemia     Trigeminal neuralgia      Past Surgical History:   Procedure Laterality Date    BREAST SURGERY      HYSTERECTOMY       Family History   Problem Relation Age of Onset    Stroke Mother     Heart disease Father      Social History     Socioeconomic History    Marital status:    Tobacco Use    Smoking status: Never    Smokeless tobacco: Never   Substance and Sexual Activity    Alcohol use: No    Drug use: No     ---------------------------------------------------------------------------------------------------------------------   Allergies:  Doxycycline, Gentamycin [gentamicin], Miconazole, Penicillins, and Sulfa antibiotics  ---------------------------------------------------------------------------------------------------------------------   Medications below are reported home medications pulling from within the system; at this time, these medications have not been reconciled unless otherwise specified and are in the verification process for further verifcation as current home medications.    Prior to Admission Medications       Prescriptions Last Dose Informant Patient Reported? Taking?    Cholecalciferol (VITAMIN D-3) 1000 UNITS capsule   Yes No    Take 1 capsule by mouth.    estradiol (CLIMARA) 0.05 MG/24HR patch   No No    place one PATCH ON THE SKIN AS DIRECTED by provider one time PER WEEK    estradiol (MINIVELLE,  VIVELLE-DOT) 0.05 MG/24HR patch   No No    Place 1 patch on the skin as directed by provider 1 (One) Time Per Week.    gabapentin (Neurontin) 400 MG capsule   No No    Take 1 capsule by mouth 2 (Two) Times a Day.    LORazepam (ATIVAN) 2 MG tablet   No No    Take 1 tablet by mouth At Night As Needed for Anxiety.    olopatadine (PATANOL) 0.1 % ophthalmic solution   No No    Administer 1 drop to both eyes 2 (Two) Times a Day.    SYNTHROID 100 MCG tablet   No No    Take 1 tablet by mouth Daily.          ---------------------------------------------------------------------------------------------------------------------    Objective     Hospital Scheduled Meds:            Current listed hospital scheduled medications may not yet reflect those currently placed in orders that are signed and held, awaiting patient's arrival to floor/unit.    ---------------------------------------------------------------------------------------------------------------------   Vital Signs:  Temp:  [98.4 °F (36.9 °C)] 98.4 °F (36.9 °C)  Heart Rate:  [] 98  Resp:  [14] 14  BP: (126-146)/(58-75) 135/61  Mean Arterial Pressure (Non-Invasive) for the past 24 hrs (Last 3 readings):   Noninvasive MAP (mmHg)   01/22/24 1855 90   01/22/24 1840 89   01/22/24 1825 89     SpO2 Percentage    01/22/24 1840 01/22/24 1855 01/22/24 2030   SpO2: 98% 96% 98%     SpO2:  [95 %-98 %] 98 %  on   ;   Device (Oxygen Therapy): room air    Body mass index is 19.46 kg/m².  Wt Readings from Last 3 Encounters:   01/22/24 58.1 kg (128 lb)   06/25/20 66.5 kg (146 lb 9.6 oz)   05/04/20 70.1 kg (154 lb 9.6 oz)     ---------------------------------------------------------------------------------------------------------------------   Physical Exam:  Physical Exam  Constitutional:       General: She is not in acute distress.     Appearance: Normal appearance.   HENT:      Head: Normocephalic and atraumatic.      Right Ear: External ear normal.      Left Ear: External ear  normal.      Nose: No nasal deformity.      Mouth/Throat:      Lips: Pink.      Mouth: Mucous membranes are moist.   Eyes:      Conjunctiva/sclera: Conjunctivae normal.      Pupils: Pupils are equal, round, and reactive to light.   Cardiovascular:      Rate and Rhythm: Normal rate and regular rhythm.      Pulses:           Dorsalis pedis pulses are 2+ on the right side and 2+ on the left side.      Heart sounds: Normal heart sounds.   Pulmonary:      Effort: Pulmonary effort is normal.      Breath sounds: Normal breath sounds. No wheezing, rhonchi or rales.   Abdominal:      General: Abdomen is flat. Bowel sounds are normal.      Palpations: Abdomen is soft.      Tenderness: There is no guarding or rebound.   Genitourinary:     Comments: No lyle catheter in place   Musculoskeletal:      Cervical back: Neck supple. Normal range of motion.      Right lower leg: No edema.      Left lower leg: No edema.   Skin:     General: Skin is warm and dry.   Neurological:      General: No focal deficit present.      Mental Status: She is alert. She is disoriented.      Comments: Rhythmic jerking of right lower extremity     Oriented to self only    Psychiatric:         Mood and Affect: Mood normal.         Behavior: Behavior normal.       ---------------------------------------------------------------------------------------------------------------------  EKG: Sinus rhythm.  Heart rate 99.  Awaiting formal cardiology read.          I have personally reviewed the EKG/Telemetry strip  ---------------------------------------------------------------------------------------------------------------------   Results from last 7 days   Lab Units 01/22/24  1534   HSTROP T ng/L <6           Results from last 7 days   Lab Units 01/22/24  1930 01/22/24  1542 01/22/24  1534   CRP mg/dL  --   --  25.25*   LACTATE mmol/L 1.8 2.2*  --    WBC 10*3/mm3  --   --  18.78*   HEMOGLOBIN g/dL  --   --  12.6   HEMATOCRIT %  --   --  38.4   MCV fL  --   --  " 90.1   MCHC g/dL  --   --  32.8   PLATELETS 10*3/mm3  --   --  399     Results from last 7 days   Lab Units 01/22/24  1534   SODIUM mmol/L 127*   POTASSIUM mmol/L 4.6   MAGNESIUM mg/dL 2.0   CHLORIDE mmol/L 89*   CO2 mmol/L 23.9   BUN mg/dL 16   CREATININE mg/dL 0.95   CALCIUM mg/dL 9.7   GLUCOSE mg/dL 233*   ALBUMIN g/dL 2.7*   BILIRUBIN mg/dL 0.6   ALK PHOS U/L 118*   AST (SGOT) U/L 38*   ALT (SGPT) U/L 32   Estimated Creatinine Clearance: 41.9 mL/min (by C-G formula based on SCr of 0.95 mg/dL).  No results found for: \"AMMONIA\"    Hemoglobin A1C   Date/Time Value Ref Range Status   01/22/2024 1534 5.90 (H) 4.80 - 5.60 % Final     Glucose   Date/Time Value Ref Range Status   01/22/2024 1506 224 (H) 70 - 130 mg/dL Final     Lab Results   Component Value Date    HGBA1C 5.90 (H) 01/22/2024     Lab Results   Component Value Date    TSH 2.659 02/26/2019    FREET4 1.46 12/22/2016       No results found for: \"BLOODCX\"  No results found for: \"URINECX\"  No results found for: \"WOUNDCX\"  No results found for: \"STOOLCX\"  No results found for: \"RESPCX\"  No results found for: \"MRSACX\"  Pain Management Panel           No data to display              I have personally reviewed the above laboratory results.   ---------------------------------------------------------------------------------------------------------------------  Imaging Results (Last 7 Days)       Procedure Component Value Units Date/Time    CT Abdomen Pelvis With Contrast [[id number]] Collected: 01/22/24 1720     Updated: 01/22/24 1726    Narrative:      EXAM:    CT Abdomen and Pelvis With Intravenous Contrast     EXAM DATE:    1/22/2024 5:05 PM     CLINICAL HISTORY:    abd pain/sepsis     TECHNIQUE:    Axial computed tomography images of the abdomen and pelvis with  intravenous contrast.  Sagittal and coronal reformatted images were  created and reviewed.  This CT exam was performed using one or more of  the following dose reduction techniques:  automated exposure " control,  adjustment of the mA and/or kV according to patient size, and/or use of  iterative reconstruction technique.     COMPARISON:    No relevant prior studies available.     FINDINGS:    Lung bases:  Dependent bibasilar atelectasis. Lung bases otherwise  clear.    Heart:  Mild coronary artery calcifications.      ABDOMEN:    Liver:  Unremarkable as visualized.  No mass.    Gallbladder and bile ducts:  Cholecystectomy.  No ductal dilation.    Pancreas:  Mild fatty infiltration of the pancreas.  No ductal  dilation.    Spleen:  Unremarkable as visualized.  No splenomegaly.    Adrenals:  Unremarkable as visualized.  No mass.    Kidneys and ureters:  There are bilateral renal parapelvic cysts but  no evidence of hydronephrosis.  No renal or ureteral stones.  No  obstructive uropathy.    Stomach and bowel:  Stomach is incompletely distended.  Diffuse  colonic diverticulosis without evidence of acute diverticulitis.      PELVIS:    Appendix:  The appendix is not clearly identified.    Bladder:  Distended urinary bladder without wall thickening.    Reproductive:  Hysterectomy noted.      ABDOMEN and PELVIS:    Intraperitoneal space:  Unremarkable as visualized.  No significant  fluid collection.  No pneumoperitoneum.    Bones/joints:  Degenerative changes of the lumbar spine with mild  multilevel stenosis but no acute bony findings identified.  Arthritic  changes of the bilateral hip joints.  No dislocation.    Soft tissues:  Unremarkable as visualized.    Vasculature:  Advanced calcified atherosclerosis without evidence of  aneurysm.    Lymph nodes:  Unremarkable as visualized.  No enlarged lymph nodes.       Impression:      1.  No acute findings identified within abdomen or pelvis.  2.  Urinary bladder distention without wall thickening.  3.  Prior cholecystectomy and hysterectomy.  4.  Bilateral renal parapelvic cysts. No obstructive uropathy.  5.  Colonic diverticulosis without diverticulitis.  6.  Moderate  constipation. No bowel obstruction.  7.  Bibasilar airspace disease most consistent with atelectasis.  8.  Other incidental/nonacute findings above.        This report was finalized on 1/22/2024 5:24 PM by Dr. Fabrizio Fair MD.       CT Head Without Contrast [[id number]] Collected: 01/22/24 1718     Updated: 01/22/24 1722    Narrative:      EXAM:    CT Head Without Intravenous Contrast     EXAM DATE:    1/22/2024 5:05 PM     CLINICAL HISTORY:    headache     TECHNIQUE:    Axial computed tomography images of the head/brain without intravenous  contrast.  Sagittal and coronal reformatted images were created and  reviewed.  This CT exam was performed using one or more of the following  dose reduction techniques:  automated exposure control, adjustment of  the mA and/or kV according to patient size, and/or use of iterative  reconstruction technique.     COMPARISON:    No relevant prior studies available.     FINDINGS:    Brain and extra-axial spaces:  Unremarkable as visualized.  No  hemorrhage.  No significant white matter disease.  No edema.  No  ventriculomegaly. Mild age-related atrophy noted.    Bones/joints:  Unremarkable as visualized.  No acute fracture.    Soft tissues:  Unremarkable as visualized.    Sinuses:  Unremarkable as visualized.  No acute sinusitis.    Mastoid air cells:  Unremarkable as visualized.  No mastoid effusion.       Impression:        Unremarkable exam demonstrating no CT evidence of acute intracranial  findings.        This report was finalized on 1/22/2024 5:20 PM by Dr. Fabrizio Fair MD.       XR Chest 1 View [[id number]] Collected: 01/22/24 1615     Updated: 01/22/24 1617    Narrative:      PROCEDURE: XR CHEST 1 VW-       HISTORY: Cough, vomiting     COMPARISON: None.     FINDINGS: The heart is normal in size. The mediastinum is unremarkable.  There are low lung volumes with a left basilar opacity either  atelectasis or pneumonia. There are no effusions. There is no  pneumothorax. There  are no acute osseous abnormalities.       Impression:      Low lung volumes with left basilar atelectasis versus  pneumonia.        This report was finalized on 1/22/2024 4:15 PM by David Ch M.D..             I have personally reviewed the above radiology results.     Last Echocardiogram:    ---------------------------------------------------------------------------------------------------------------------    Assessment & Plan      Active Hospital Problems    Diagnosis  POA    **Vomiting [R11.10]  Yes     Nausea with vomiting, POA  Generalized weakness, POA  SIRS versus severe sepsis, POA   Dementia  Generalized weakness over the past several weeks.  Nausea with vomiting over the past 24 hours.  Continue as needed Zofran for nausea management.  Patient does meet SIRS criteria at this time without definite source of infection for sepsis. Continue to monitor vital signs. HR>90 WBC >12  There is a concern for possible aspiration pneumonia given vomiting and apparent witnessed aspiration per son, as well as elevated inflammatory markers on initial lab work.  Respiratory PCR negative   SLP consult to evaluate risk of aspiration.  Antibiotic coverage with vancomycin, Azactam and Flagyl to cover for suspected aspiration pneumonia/pneumonitis.  Blood cultures ordered at ER.  PT/OT consults due to generalized weakness.  Tremor versus seizure-like activity, POA  Patient with intermittent rhythmic moving of right lower extremity.  Cerebell ordered while patient in ED, current seizure burden 0%, will continue to monitor on Cerebell for ~1hr before moving to the floor.   Hyponatremia, POA  Corrected sodium of 130.  Small send NS given in ED.  Continue IV NS 30 mill per hour.  Repeat CMP in the a.m.  Hyperglycemia without diagnosis of type II diabetes mellitus,  POA  History of prediabetes  Glucose 233 in the ED.  Hemoglobin A1c 5.9.  Family notes history of prediabetes but no history of type II DM.  Accu-Cheks  ordered to trend glucose, hypoglycemia protocol in place.  CHRONIC MEDICAL PROBLEMS  Anxiety  Hyperlipidemia  Trigeminal neuralgia  Hypothyroidism  Restart home meds pending formal med rec.  TSH ordered.  Supportive care    F/E/N: IV NS at 100 mill per hour.  Continue monitor electrolytes.  NPO.    ---------------------------------------------------  DVT Prophylaxis: subcu lovenox  Activity: up with assistance     The patient is considered to be a high risk patient due to: generalized weakness, SIRS     INPATIENT status due to the need for care which can only be reasonably provided in an hospital setting such as aggressive/expedited ancillary services and/or consultation services, the necessity for IV medications, close physician monitoring and/or the possible need for procedures.  In such, I feel patient’s risk for adverse outcomes and need for care warrant INPATIENT evaluation and predict the patient’s care encounter to likely last beyond 2 midnights.      Code Status: DNR/DNI. Discussed with  and son at bedside.     Disposition/Discharge planning: Pending clinical course, may need placement, CM/SS consulted     I have discussed the patient's assessment and plan with Dr. Baylee Castro PA-C  Hospitalist Service -- [facility]       01/22/24  20:48 EST    Attending Physician: Dr. Beach       Electronically signed by Kinjal Beach DO at 01/22/24 3425       Current Facility-Administered Medications   Medication Dose Route Frequency Provider Last Rate Last Admin    acetaminophen (TYLENOL) tablet 650 mg  650 mg Oral Q6H PRN Kinjal Beach DO   650 mg at 01/29/24 0813    Or    acetaminophen (TYLENOL) suppository 650 mg  650 mg Rectal Q4H PRN Kinjla Beach DO        atorvastatin (LIPITOR) tablet 20 mg  20 mg Oral Daily Kinjal Beach DO   20 mg at 01/29/24 0814    sennosides-docusate (PERICOLACE) 8.6-50 MG per tablet 2 tablet  2 tablet Oral BID Kinjal Beach  DO Jaciel   2 tablet at 01/29/24 0814    And    polyethylene glycol (MIRALAX) packet 17 g  17 g Oral Daily PRN Kinjal Beach DO        And    bisacodyl (DULCOLAX) EC tablet 5 mg  5 mg Oral Daily PRN Kinjal Beach DO        And    bisacodyl (DULCOLAX) suppository 10 mg  10 mg Rectal Daily PRN Kinjal Beach DO        buPROPion XL (WELLBUTRIN XL) 24 hr tablet 150 mg  150 mg Oral Daily Kinjal Beach DO   150 mg at 01/29/24 0814    cefTRIAXone (ROCEPHIN) 2,000 mg in sodium chloride 0.9 % 100 mL IVPB-VTB  2,000 mg Intravenous Q24H Shandra Burroughs APRN 200 mL/hr at 01/28/24 1227 2,000 mg at 01/28/24 1227    dextrose (D50W) (25 g/50 mL) IV injection 25 g  25 g Intravenous Q15 Min PRN Kinjal Beach DO        dextrose (GLUTOSE) oral gel 15 g  15 g Oral Q15 Min PRN Kinjal Beach DO        diazePAM (VALIUM) injection 5 mg  5 mg Intramuscular Q4H PRN Narinder Maravilla APRN   5 mg at 01/29/24 0051    gabapentin (NEURONTIN) capsule 400 mg  400 mg Oral Nightly Kinjal Beach DO   400 mg at 01/28/24 2057    glucagon HCl (Diagnostic) injection 1 mg  1 mg Intramuscular Q15 Min PRN Kinjal Beach DO        heparin (porcine) 5000 UNIT/ML injection 5,000 Units  5,000 Units Subcutaneous Q12H Kinjal Beach DO   5,000 Units at 01/29/24 0813    Insulin Lispro (humaLOG) injection 2-7 Units  2-7 Units Subcutaneous 4x Daily AC & at Bedtime Kinjal Beach DO   2 Units at 01/27/24 2015    LORazepam (ATIVAN) tablet 2 mg  2 mg Oral Nightly Kinjal Beach DO   2 mg at 01/28/24 2057    meclizine (ANTIVERT) tablet 25 mg  25 mg Oral TID PRN Kinjal Beach DO        megestrol (MEGACE) 40 MG/ML suspension 200 mg  200 mg Oral TID Kinjal Beach DO   200 mg at 01/29/24 0814    melatonin tablet 5 mg  5 mg Oral Nightly PRN Narinder Maravilla APRN   5 mg at 01/28/24 2332    nitroglycerin (NITROSTAT) SL tablet 0.4 mg  0.4 mg Sublingual Q5 Min PRN Kinjal Beach, DO         ondansetron (ZOFRAN) injection 4 mg  4 mg Intravenous Q6H PRN Kinjal Beach, DO   4 mg at 24    ondansetron (ZOFRAN) tablet 8 mg  8 mg Oral Q8H PRN Kinjal Beach, DO   8 mg at 24 1743    pantoprazole (PROTONIX) EC tablet 40 mg  40 mg Oral Q AM Kinjal Beach, DO   40 mg at 24 0540    sodium chloride 0.9 % flush 10 mL  10 mL Intravenous PRN Baylee, Kinjal Jaciel, DO        sodium chloride 0.9 % flush 10 mL  10 mL Intravenous Q12H Kinjal Beach, DO   10 mL at 24 0818    sodium chloride 0.9 % flush 10 mL  10 mL Intravenous PRN Baylee, Kinjal Grissom, DO        sodium chloride 0.9 % infusion 40 mL  40 mL Intravenous PRN Kinjal Beach, DO              Physician Progress Notes (most recent note)        Jyothi King MD at 24 1138                     PROGRESS NOTE         Patient Identification:  Name:  Leonie Laws  Age:  83 y.o.  Sex:  female  :  1940  MRN:  [id number]  Visit Number:  [id number]  Primary Care Provider:  Joe Bowden MD         LOS: 7 days       ----------------------------------------------------------------------------------------------------------------------  Subjective       Chief Complaints:    Vomiting and Weakness - Generalized        Interval History:      The patient is awake and alert, eating breakfast.  On room air with no apparent distress.  Family at the bedside.  Afebrile.  Denies diarrhea.  Lung exam is clear to auscultation bilaterally.  Abdomen is soft and nontender with normoactive bowel sounds.  WBC is normal at 10.59.  Blood cultures from 2024 preliminarily report no growth at 4 days.  The patient reports she is anxious to return home.    Review of Systems:    Constitutional: no fever, chills and night sweats.  Generalized fatigue.  Eyes: no eye drainage, itching or redness.  HEENT: no mouth sores, dysphagia or nose bleed.  Respiratory: no for shortness of breath, cough or production  of sputum.  Cardiovascular: no chest pain, no palpitations, no orthopnea.  Gastrointestinal: no nausea, vomiting or diarrhea. No abdominal pain, hematemesis or rectal bleeding.  Genitourinary: no dysuria or polyuria.  Hematologic/lymphatic: no lymph node abnormalities, no easy bruising or easy bleeding.  Musculoskeletal: no muscle or joint pain.  Skin: No rash and no itching.  Neurological: no loss of consciousness, no seizure, no headache.  Behavioral/Psych: no depression or suicidal ideation.  Endocrine: no hot flashes.  Immunologic: negative.    ----------------------------------------------------------------------------------------------------------------------      Objective       [facility] [place] Meds:  atorvastatin, 20 mg, Oral, Daily  buPROPion XL, 150 mg, Oral, Daily  cefTRIAXone, 2,000 mg, Intravenous, Q24H  gabapentin, 400 mg, Oral, Nightly  heparin (porcine), 5,000 Units, Subcutaneous, Q12H  insulin lispro, 2-7 Units, Subcutaneous, 4x Daily AC & at Bedtime  LORazepam, 2 mg, Oral, Nightly  megestrol, 200 mg, Oral, TID  pantoprazole, 40 mg, Oral, Q AM  senna-docusate sodium, 2 tablet, Oral, BID  sodium chloride, 10 mL, Intravenous, Q12H           ----------------------------------------------------------------------------------------------------------------------    Vital Signs:  Temp:  [98.2 °F (36.8 °C)-98.7 °F (37.1 °C)] 98.3 °F (36.8 °C)  Heart Rate:  [89-95] 95  Resp:  [18] 18  BP: (112-154)/(61-87) 112/61  Mean Arterial Pressure (Non-Invasive) for the past 24 hrs (Last 3 readings):   Noninvasive MAP (mmHg)   01/29/24 1022 80   01/29/24 0636 97   01/28/24 1421 86     SpO2 Percentage    01/28/24 1421 01/29/24 0636 01/29/24 1022   SpO2: 97% 96% 95%     SpO2:  [95 %-97 %] 95 %  on   ;   Device (Oxygen Therapy): room air    Body mass index is 18.98 kg/m².  Wt Readings from Last 3 Encounters:   01/22/24 56.6 kg (124 lb 12.8 oz)   06/25/20 66.5 kg (146 lb 9.6 oz)   05/04/20 70.1 kg (154 lb 9.6 oz)         Intake/Output Summary (Last 24 hours) at 1/29/2024 1138  Last data filed at 1/29/2024 0833  Gross per 24 hour   Intake 800 ml   Output 600 ml   Net 200 ml     Diet: Regular/House Diet; Feeding Assistance - Nursing; Texture: Soft to Chew (NDD 3); Soft to Chew: Chopped Meat; Fluid Consistency: Thin (IDDSI 0)  ----------------------------------------------------------------------------------------------------------------------      Physical Exam:    Constitutional: Elderly and chronically ill-appearing.  Awake and alert, eating breakfast.  Sitting up comfortably in bed on room air with no apparent distress.  Family at the bedside.  HENT:  Head: Normocephalic and atraumatic.  Mouth:  Moist mucous membranes.    Eyes:  Conjunctivae and EOM are normal.  No scleral icterus.  Neck:  Neck supple.  No JVD present.    Cardiovascular:  Normal rate, regular rhythm and normal heart sounds with no murmur. No edema.  Pulmonary/Chest:  No respiratory distress, no wheezes, no crackles, with normal breath sounds and good air movement.  On room air.    Abdominal:  Soft.  Bowel sounds are normal.  No distension and no tenderness.   Musculoskeletal:  No edema, no tenderness, and no deformity.  No swelling or redness of joints.  Neurological:  Alert and oriented to person and place, slightly confused, sleepy  Skin:  Skin is warm and dry.  No rash noted.  No pallor.           ----------------------------------------------------------------------------------------------------------------------  Results from last 7 days   Lab Units 01/22/24  1534   HSTROP T ng/L <6           Results from last 7 days   Lab Units 01/29/24  0146 01/28/24  0304 01/27/24  0204 01/23/24  0509 01/22/24  1930 01/22/24  1542 01/22/24  1534   CRP mg/dL  --   --   --   --   --   --  25.25*   LACTATE mmol/L  --   --   --   --  1.8 2.2*  --    WBC 10*3/mm3 10.59 10.51 12.30*   < >  --   --  18.78*   HEMOGLOBIN g/dL 9.6* 8.7* 8.9*   < >  --   --  12.6   HEMATOCRIT %  "28.4* 26.0* 26.9*   < >  --   --  38.4   MCV fL 87.4 88.7 88.2   < >  --   --  90.1   MCHC g/dL 33.8 33.5 33.1   < >  --   --  32.8   PLATELETS 10*3/mm3 504* 418 381   < >  --   --  399    < > = values in this interval not displayed.     Results from last 7 days   Lab Units 01/29/24  0146 01/28/24  0304 01/27/24  0204 01/24/24  0449 01/23/24  0509 01/22/24  1534   SODIUM mmol/L 138 138 135*   < > 130* 127*   POTASSIUM mmol/L 3.8 3.4* 3.0*   < > 3.9 4.6   MAGNESIUM mg/dL  --   --  1.9  --   --  2.0  2.0   CHLORIDE mmol/L 105 107 104   < > 97* 89*   CO2 mmol/L 24.1 23.4 24.3   < > 25.7 23.9   BUN mg/dL 5* 5* 6*   < > 17 16   CREATININE mg/dL 0.64 0.62 0.62   < > 0.85 0.95   CALCIUM mg/dL 8.6 8.1* 8.1*   < > 8.7 9.7   GLUCOSE mg/dL 104* 135* 122*   < > 138* 233*   ALBUMIN g/dL  --   --   --   --  2.3* 2.7*   BILIRUBIN mg/dL  --   --   --   --  0.3 0.6   ALK PHOS U/L  --   --   --   --  140* 118*   AST (SGOT) U/L  --   --   --   --  36* 38*   ALT (SGPT) U/L  --   --   --   --  28 32    < > = values in this interval not displayed.   Estimated Creatinine Clearance: 60.6 mL/min (by C-G formula based on SCr of 0.64 mg/dL).  No results found for: \"AMMONIA\"    Glucose   Date/Time Value Ref Range Status   01/29/2024 1043 104 70 - 130 mg/dL Final   01/29/2024 0639 84 70 - 130 mg/dL Final   01/28/2024 2034 123 70 - 130 mg/dL Final   01/28/2024 1621 120 70 - 130 mg/dL Final   01/28/2024 1057 148 (H) 70 - 130 mg/dL Final   01/28/2024 0700 110 70 - 130 mg/dL Final   01/27/2024 2011 172 (H) 70 - 130 mg/dL Final   01/27/2024 1609 94 70 - 130 mg/dL Final     Lab Results   Component Value Date    HGBA1C 5.90 (H) 01/22/2024     Lab Results   Component Value Date    TSH 0.897 01/22/2024    FREET4 1.46 12/22/2016       Blood Culture   Date Value Ref Range Status   01/22/2024 Streptococcus pyogenes (Group A) (C)  Preliminary   01/22/2024 Streptococcus pyogenes (Group A) (C)  Final     No results found for: \"URINECX\"  No results found for: " "\"WOUNDCX\"  No results found for: \"STOOLCX\"  No results found for: \"RESPCX\"  Pain Management Panel           No data to display                  ----------------------------------------------------------------------------------------------------------------------  Imaging Results (Last 24 Hours)       ** No results found for the last 24 hours. **            ----------------------------------------------------------------------------------------------------------------------    Pertinent Infectious Disease Results      Assessment/Plan       Assessment     Severe sepsis with lactic acid greater than 2 on admission  Streptococcus pyogenes bacteremia        Plan      I saw and examined the patient myself this morning with LEONARDO Davis and discussed the plan of care with her and primary RN and here are my findings:    The patient is currently awake and alert, actively engaged in eating breakfast, with family members present at the bedside. There is no apparent distress, and the patient is afebrile while denying any issues with diarrhea. The lung examination reveals clear auscultation bilaterally, and the abdomen is soft, nontender, with normoactive bowel sounds. The white blood cell count is within the normal range at 10.59. Preliminary results from blood cultures conducted on 1/25/2024 show no growth at 4 days. The patient expresses eagerness to return home.    It is recommended to maintain the current regimen of ceftriaxone at 2 g IV every 24 hours for Streptococcus pyogenes bacteremia. The patient will require a 14-day antibiotic therapy duration starting from the first negative blood cultures, tentatively concluding on 2/8/2024. Considering the significant clinical improvement, there is a mutual agreement to transition to an oral Omnicef course at the time of discharge, ensuring the completion of the full 14-day course for bacteremia. Follow-up in the outpatient infectious disease clinic is planned to repeat " blood cultures x 2 at that time.      ANTIMICROBIAL THERAPY    cefTRIAXone (ROCEPHIN) 2000 mg IVPB in 100 mL NS (VTB)     Code Status:   Code Status and Medical Interventions:   Ordered at: 24     Medical Intervention Limits:    NO intubation (DNI)     Code Status (Patient has no pulse and is not breathing):    No CPR (Do Not Attempt to Resuscitate)     Medical Interventions (Patient has pulse or is breathing):    Limited Support       LEONARDO Strong  24  11:38 EST    Electronically signed by LEONARDO Strong, 24, 11:41 AM EST.    Electronically signed by Jyothi King MD, 24, 1:00 PM EST.      Electronically signed by Jyothi King MD at 24 1302          Physical Therapy Notes (most recent note)        Frances Guerra PTA at 24 1441  Version 1 of 1            24 1441   OTHER   Discipline physical therapy assistant   Rehab Time/Intention   Session Not Performed other (see comments)  (Pt refused in AM)         Electronically signed by Frances Guerra PTA at 24 1441          Occupational Therapy Notes (most recent note)        Elda Wellington OT at 24 1114          Patient Name: Leonie Laws  : 1940    MRN: [id number]                              Today's Date: 2024       Admit Date: 2024    Visit Dx:     ICD-10-CM ICD-9-CM   1. Nausea and vomiting, unspecified vomiting type  R11.2 787.01   2. Dehydration  E86.0 276.51   3. Debility  R53.81 799.3     Patient Active Problem List   Diagnosis    Cystocele    Diverticulosis of large intestine    Hemorrhoids    Hyperlipidemia    Postoperative hypothyroidism    Impaired fasting glucose    Osteoporosis    Rectocele    Trigeminal neuralgia    Anxiety    Vomiting     Past Medical History:   Diagnosis Date    Anxiety     Hyperlipidemia     Trigeminal neuralgia      Past Surgical History:   Procedure Laterality Date    BREAST SURGERY      HYSTERECTOMY         General Information       Row Name 01/24/24 1103          OT Time and Intention    Document Type evaluation  -LA     Mode of Treatment occupational therapy  -LA       Row Name 01/24/24 1103          General Information    Patient Profile Reviewed yes  -LA     Prior Level of Function independent:;ADL's  Patient reported being independent until approx 1 week prior to hospitalization. Patient has walker that she only used the week before admission  -LA     Existing Precautions/Restrictions fall  -LA     Barriers to Rehab none identified  -LA       Row Name 01/24/24 1103          Occupational Profile    Reason for Services/Referral (Occupational Profile) OT to assess for changes in level of independence/safety with ADLs.  -LA     Patient Goals (Occupational Profile) Return home with spouse  -LA       Row Name 01/24/24 1103          Living Environment    People in Home spouse  -LA       Row Name 01/24/24 1103          Cognition    Orientation Status (Cognition) oriented x 4  -LA       Row Name 01/24/24 1103          Safety Issues, Functional Mobility    Safety Issues Affecting Function (Mobility) insight into deficits/self-awareness  -LA     Impairments Affecting Function (Mobility) endurance/activity tolerance;strength  -LA               User Key  (r) = Recorded By, (t) = Taken By, (c) = Cosigned By      Initials Name Provider Type    Elda Rey, GABRIELLE Occupational Therapist                     Mobility/ADL's       Row Name 01/24/24 1105          Bed Mobility    Bed Mobility supine-sit  -LA     Supine-Sit [place] (Bed Mobility) minimum assist (75% patient effort)  -LA     Assistive Device (Bed Mobility) head of bed elevated  -LA       Row Name 01/24/24 1105          Transfers    Transfers bed-chair transfer;sit-stand transfer;toilet transfer  -LA       Row Name 01/24/24 1105          Bed-Chair Transfer    Bed-Chair [place] (Transfers) minimum assist (75% patient effort);contact guard  -LA     Assistive  Device (Bed-Chair Transfers) walker, front-wheeled  -LA       Row Name 01/24/24 1105          Sit-Stand Transfer    Sit-Stand [place] (Transfers) minimum assist (75% patient effort)  ModA sit to stand from bathroom commode  -LA       Row Name 01/24/24 1105          Toilet Transfer    Type (Toilet Transfer) stand pivot/stand step  -LA     [place] Level (Toilet Transfer) minimum assist (75% patient effort);moderate assist (50% patient effort)  Min-Mod due to fatigue from fx mobility to bathroom  -LA       Row Name 01/24/24 1105          Functional Mobility    Functional Mobility- Ind. Level minimum assist (75% patient effort)  -LA     Functional Mobility- Device walker, front-wheeled  -LA     Patient was able to Ambulate yes  -LA       Row Name 01/24/24 1105          Activities of Daily Living    BADL Assessment/Intervention bathing;lower body dressing;upper body dressing;grooming;feeding;toileting  -LA       Row Name 01/24/24 1105          Bathing Assessment/Intervention    [place] Level (Bathing) maximum assist (25% patient effort);moderate assist (50% patient effort)  -LA       Row Name 01/24/24 1105          Lower Body Dressing Assessment/Training    [place] Level (Lower Body Dressing) maximum assist (25% patient effort)  -LA       Row Name 01/24/24 1105          Upper Body Dressing Assessment/Training    [place] Level (Upper Body Dressing) minimum assist (75% patient effort)  -LA       Row Name 01/24/24 1105          Grooming Assessment/Training    [place] Level (Grooming) minimum assist (75% patient effort)  -LA       Row Name 01/24/24 1105          Self-Feeding Assessment/Training    [place] Level (Feeding) set up  -LA       Row Name 01/24/24 1105          Toileting Assessment/Training    [place] Level (Toileting) moderate assist (50% patient effort)  -LA               User Key  (r) = Recorded By, (t) = Taken By, (c) = Cosigned By      Initials Name Provider Type    LA  Elda Wellington OT Occupational Therapist                   Obj/Interventions       Row Name 01/24/24 1107          Sensory Assessment (Somatosensory)    Sensory Assessment (Somatosensory) sensation intact  -LA       Row Name 01/24/24 1107          Vision Assessment/Intervention    Visual Impairment/Limitations WFL  -LA       [facility] Name 01/24/24 1107          Range of Motion Comprehensive    General Range of Motion no range of motion deficits identified  -LA     Comment, General Range of Motion BUE ROM grossly WFL  -LA       Row Name 01/24/24 1107          Strength Comprehensive (MMT)    General Manual Muscle Testing (MMT) Assessment upper extremity strength deficits identified  -LA     Comment, General Manual Muscle Testing (MMT) Assessment UE strength grossly 4-/5  -LA       [facility] Name 01/24/24 1107          Motor Skills    Motor Skills coordination;functional endurance  -LA     Coordination WFL  -LA     Functional Endurance poor+  -LA               User Key  (r) = Recorded By, (t) = Taken By, (c) = Cosigned By      Initials Name Provider Type    Elda Rey OT Occupational Therapist                   Goals/Plan       [facility] Name 01/24/24 1111          Transfer Goal 1 (OT)    Activity/Assistive Device (Transfer Goal 1, OT) transfers, all  -LA     [place] Level/Cues Needed (Transfer Goal 1, OT) modified independence  -LA     Time Frame (Transfer Goal 1, OT) by discharge  -LA       Row Name 01/24/24 1111          Bathing Goal 1 (OT)    Activity/Device (Bathing Goal 1, OT) bathing skills, all  -LA     [place] Level/Cues Needed (Bathing Goal 1, OT) minimum assist (75% or more patient effort)  -LA     Time Frame (Bathing Goal 1, OT) by discharge  -LA       Row Name 01/24/24 1111          Dressing Goal 1 (OT)    Activity/Device (Dressing Goal 1, OT) dressing skills, all  -LA     [place]/Cues Needed (Dressing Goal 1, OT) modified independence  -LA     Time Frame (Dressing Goal 1, OT) by discharge  -LA        Row Name 01/24/24 1111          Problem Specific Goal 1 (OT)    Problem Specific Goal 1 (OT) Patient will demonstrate 10 minute fx activity tolerance to increase safety/independence with ADLs  -LA     Time Frame (Problem Specific Goal 1, OT) by discharge  -LA       Row Name 01/24/24 1111          Therapy Assessment/Plan (OT)    Planned Therapy Interventions (OT) activity tolerance training;adaptive equipment training;BADL retraining;patient/caregiver education/training;ROM/therapeutic exercise;strengthening exercise;transfer/mobility retraining  -LA               User Key  (r) = Recorded By, (t) = Taken By, (c) = Cosigned By      Initials Name Provider Type    Elda Rey OT Occupational Therapist                   Clinical Impression       Row Name 01/24/24 1108          Plan of Care Review    Plan of Care Reviewed With patient;spouse  -LA     Outcome Evaluation OT evaluation completed this date. Patient with significant weakness resulting in decreased safety and independence with ADLs. Patient would benefit from OT services to address deficits and maximize independence prior to discharge. Patient verbalizes strong desire to return home at discharge however patient would benefit from therapy services prior to discharge as patient spouse with recent surgery and injury from fall.  -LA       Row Name 01/24/24 1108          Therapy Assessment/Plan (OT)    Patient/Family Therapy Goal Statement (OT) Return home  -LA     Rehab Potential (OT) good, to achieve stated therapy goals  -LA     Criteria for Skilled Therapeutic Interventions Met (OT) meets criteria;yes;skilled treatment is necessary  -LA     Therapy Frequency (OT) --  PRN to follow for changes/progress toward goals.  -LA       Row Name 01/24/24 1108          Therapy Plan Review/Discharge Plan (OT)    Equipment Needs Upon Discharge (OT) --  TBD  -LA     Anticipated Discharge Disposition (OT) --  TBD; family all in disagreement with d/c plans  -LA        Row Name 01/24/24 1108          Positioning and Restraints    Pre-Treatment Position in bed  -LA     Post Treatment Position chair  -LA     In Chair sitting;call light within reach;encouraged to call for assist;with family/caregiver;with PT  -LA               User Key  (r) = Recorded By, (t) = Taken By, (c) = Cosigned By      Initials Name Provider Type    Elda Rey OT Occupational Therapist                   Outcome Measures       Row Name 01/24/24 0800 01/24/24 0700       How much help from another person do you currently need...    Turning from your back to your side while in flat bed without using bedrails? 2  -RG 2  -RG    Moving from lying on back to sitting on the side of a flat bed without bedrails? 2  -RG 2  -RG    Moving to and from a bed to a chair (including a wheelchair)? 2  -RG 2  -RG    Standing up from a chair using your arms (e.g., wheelchair, bedside chair)? 2  -RG 2  -RG    Climbing 3-5 steps with a railing? 2  -RG 2  -RG    To walk in hospital room? 2  -RG 2  -RG    AM-PAC 6 Clicks Score (PT) 12  -RG 12  -RG    Highest Level of Mobility Goal 4 --> Transfer to chair/commode  -RG 4 --> Transfer to chair/commode  -RG              User Key  (r) = Recorded By, (t) = Taken By, (c) = Cosigned By      Initials Name Provider Type    Nikhil Betts, RN Registered Nurse                      OT Recommendation and Plan  Planned Therapy Interventions (OT): activity tolerance training, adaptive equipment training, BADL retraining, patient/caregiver education/training, ROM/therapeutic exercise, strengthening exercise, transfer/mobility retraining  Therapy Frequency (OT):  (PRN to follow for changes/progress toward goals.)  Plan of Care Review  Plan of Care Reviewed With: patient, spouse  Outcome Evaluation: OT evaluation completed this date. Patient with significant weakness resulting in decreased safety and independence with ADLs. Patient would benefit from OT services to address deficits and  "maximize independence prior to discharge. Patient verbalizes strong desire to return home at discharge however patient would benefit from therapy services prior to discharge as patient spouse with recent surgery and injury from fall.     Time Calculation:          Therapy Charges for Today       Code Description Service Date Service Provider Modifiers Qty    [id number] HC OT EVAL MOD COMPLEXITY 4 2024 Elda Wellington OT GO 1                 Elda Wellington OT  2024    Electronically signed by Elda Wellington OT at 24 1114          Speech Language Pathology Notes (most recent note)        Jennifer Devlin MS CCC-SLP at 24 1133          Acute Care - Speech Language Pathology   Swallow Initial Evaluation [facility]  CLINICAL DYSPHAGIA EVALUATION     Patient Name: Leonie Laws  : 1940  MRN: [id number]  Today's Date: 2024     Admit Date: 2024  Leonie Laws  was seen at bedside this am on [id number] to assess safety/efficacy of swallowing fnx, determine safest/least restrictive diet tolerance.     Ms Laws has a medical hx significant for anxiety, hyperlipidemia, trigeminal neuralgia, and hypothyroidism. She is unfamiliar to SLP department of [facility] prior to this admission.     Per EMR review, Ms Laws was reported to have been \"getting progressively weaker over the past several weeks\" w/ poor po intake noted. Nausea and vomiting was reported to have begun the day prior to presentation to [facility] and her son had reported concern for aspiration of her vomit as she had been noted by family to have an intermittent cough since that time.     Upon arrival to [facility] ED, \"vitals were temperature 98.4, , RR 14, /75, SpO2 98% on room air.  Labs significant for corrected sodium 130, glucose 233.  Hemoglobin A1c 5.9.  CRP 25.25, lactate 2.2, WBC 18.79 with left shift.  Chest x-ray shows left basilar atelectasis versus pneumonia.  CT abdomen/pelvis shows no acute identified findings.  Moderate " "constipation.  Bibasilar airspace disease most consistent with atelectasis.  CT head with no acute findings\".     Social History     Socioeconomic History    Marital status:    Tobacco Use    Smoking status: Never    Smokeless tobacco: Never   Vaping Use    Vaping Use: Never used   Substance and Sexual Activity    Alcohol use: No    Drug use: No    Sexual activity: Defer   Imaging:     Radiology Results (last 21 days)    Procedure Component Value Units Date/Time    CT Abdomen Pelvis With Contrast [[id number]] Melvin as Reviewed   Order Status: Completed Collected: 01/22/24 1720    Updated: 01/22/24 1726   Narrative:     EXAM:    CT Abdomen and Pelvis With Intravenous Contrast     EXAM DATE:    1/22/2024 5:05 PM     CLINICAL HISTORY:    abd pain/sepsis     TECHNIQUE:    Axial computed tomography images of the abdomen and pelvis with  intravenous contrast.  Sagittal and coronal reformatted images were  created and reviewed.  This CT exam was performed using one or more of  the following dose reduction techniques:  automated exposure control,  adjustment of the mA and/or kV according to patient size, and/or use of  iterative reconstruction technique.     COMPARISON:    No relevant prior studies available.     FINDINGS:    Lung bases:  Dependent bibasilar atelectasis. Lung bases otherwise  clear.    Heart:  Mild coronary artery calcifications.      ABDOMEN:    Liver:  Unremarkable as visualized.  No mass.    Gallbladder and bile ducts:  Cholecystectomy.  No ductal dilation.    Pancreas:  Mild fatty infiltration of the pancreas.  No ductal  dilation.    Spleen:  Unremarkable as visualized.  No splenomegaly.    Adrenals:  Unremarkable as visualized.  No mass.    Kidneys and ureters:  There are bilateral renal parapelvic cysts but  no evidence of hydronephrosis.  No renal or ureteral stones.  No  obstructive uropathy.    Stomach and bowel:  Stomach is incompletely distended.  Diffuse  colonic diverticulosis without " evidence of acute diverticulitis.      PELVIS:    Appendix:  The appendix is not clearly identified.    Bladder:  Distended urinary bladder without wall thickening.    Reproductive:  Hysterectomy noted.      ABDOMEN and PELVIS:    Intraperitoneal space:  Unremarkable as visualized.  No significant  fluid collection.  No pneumoperitoneum.    Bones/joints:  Degenerative changes of the lumbar spine with mild  multilevel stenosis but no acute bony findings identified.  Arthritic  changes of the bilateral hip joints.  No dislocation.    Soft tissues:  Unremarkable as visualized.    Vasculature:  Advanced calcified atherosclerosis without evidence of  aneurysm.    Lymph nodes:  Unremarkable as visualized.  No enlarged lymph nodes.   Impression:     1.  No acute findings identified within abdomen or pelvis.  2.  Urinary bladder distention without wall thickening.  3.  Prior cholecystectomy and hysterectomy.  4.  Bilateral renal parapelvic cysts. No obstructive uropathy.  5.  Colonic diverticulosis without diverticulitis.  6.  Moderate constipation. No bowel obstruction.  7.  Bibasilar airspace disease most consistent with atelectasis.  8.  Other incidental/nonacute findings above.     This report was finalized on 1/22/2024 5:24 PM by Dr. Fabrizio Fair MD.       CT Head Without Contrast [[id number]] Melvin as Reviewed   Order Status: Completed Collected: 01/22/24 1718    Updated: 01/22/24 1722   Narrative:     EXAM:    CT Head Without Intravenous Contrast     EXAM DATE:    1/22/2024 5:05 PM     CLINICAL HISTORY:    headache     TECHNIQUE:    Axial computed tomography images of the head/brain without intravenous  contrast.  Sagittal and coronal reformatted images were created and  reviewed.  This CT exam was performed using one or more of the following  dose reduction techniques:  automated exposure control, adjustment of  the mA and/or kV according to patient size, and/or use of iterative  reconstruction technique.      COMPARISON:    No relevant prior studies available.     FINDINGS:    Brain and extra-axial spaces:  Unremarkable as visualized.  No  hemorrhage.  No significant white matter disease.  No edema.  No  ventriculomegaly. Mild age-related atrophy noted.    Bones/joints:  Unremarkable as visualized.  No acute fracture.    Soft tissues:  Unremarkable as visualized.    Sinuses:  Unremarkable as visualized.  No acute sinusitis.    Mastoid air cells:  Unremarkable as visualized.  No mastoid effusion.   Impression:       Unremarkable exam demonstrating no CT evidence of acute intracranial  findings.     This report was finalized on 1/22/2024 5:20 PM by Dr. Fabrizio Fair MD.       XR Chest 1 View [[id number]] Melvin as Reviewed   Order Status: Completed Collected: 01/22/24 1615    Updated: 01/22/24 1617   Narrative:     PROCEDURE: XR CHEST 1 VW-       HISTORY: Cough, vomiting     COMPARISON: None.     FINDINGS: The heart is normal in size. The mediastinum is unremarkable.  There are low lung volumes with a left basilar opacity either  atelectasis or pneumonia. There are no effusions. There is no  pneumothorax. There are no acute osseous abnormalities.      Impression:     Low lung volumes with left basilar atelectasis versus  pneumonia.     This report was finalized on 1/22/2024 4:15 PM by David Ch M.D..     Labs:   Latest Reference Range & Units 01/22/24 15:34 01/23/24 05:09   WBC 3.40 - 10.80 10*3/mm3 18.78 (H) 16.16 (H)   RBC 3.77 - 5.28 10*6/mm3 4.26 3.18 (L)   Hemoglobin 12.0 - 15.9 g/dL 12.6 9.3 (L)   Hematocrit 34.0 - 46.6 % 38.4 27.9 (L)   Platelets 140 - 450 10*3/mm3 399 340   RDW 12.3 - 15.4 % 14.4 14.2   MCV 79.0 - 97.0 fL 90.1 87.7   MCH 26.6 - 33.0 pg 29.6 29.2   MCHC 31.5 - 35.7 g/dL 32.8 33.3   MPV 6.0 - 12.0 fL 9.7 10.4   RDW-SD 37.0 - 54.0 fl 47.3 45.8   (H): Data is abnormally high  (L): Data is abnormally low  Diet Orders (active) (From admission, onward)       Start     Ordered    01/23/24 5526   DIET MESSAGE Diet has been NPO. Please send a lunch tray.  Once        Comments: Diet has been NPO. Please send a lunch tray.    01/23/24 1135    01/23/24 1135  Diet: Regular/House Diet; Feeding Assistance - Nursing; Texture: Soft to Chew (NDD 3); Soft to Chew: Chopped Meat; Fluid Consistency: Thin (IDDSI 0)  Diet Effective Now         01/23/24 1135                  Upon SLP presentation to pt room, MD is at bedside as well as family members. She answers MD questions appropriately though is limitedly conversant. Following MD departure from room, Ms Laws is able to introduce remaining family member as well as her relationship to pt. She is oriented to self and current situation at this time. She is able to discuss prior occupation and family relationships w/ appropriate answers. Generally flat affect is noted.     She is observed on room air w/o complications across this evaluation.    Ms Laws was repositioned to fully upright and centered in bed w/ assist from MD to accept multiple po presentations of ice chips, solid cracker, puree, and thin liquids via spoon and straw. Ms Laws initially reported that she was able to self provide po trials, however when given opportunity to do so she requested assistance as she stated she was concerned for spilling or dropping cup or utensil.     Facial/oral structures were generally symmetrical upon observation. Lingual protrusion revealed no deviation. Oral mucosa were slightly xerostomic, pink, and clean. Secretions were clear, thin, and well controlled. OROM/NATASHA was generally weak overall to imitate oral postures. Gag is not assessed. Volitional cough was intact w/ moderately weak intensity, clear in quality, non-productive. Voice was adequate in intensity, clear in quality w/ intelligible speech.    Upon po presentations, adequate bolus anticipation and acceptance for spoon and straw presentation. She initially reports she will accept cup presentation style, however as she  begins to bring cup toward labial edge w/ SLP assist for cup stabilization she declines and requests a straw. Labial seal is mildly weak for bolus clearance via spoon bowl w/ some trace oral loss of thin liquids and incomplete clearance of puree consistency from spoon bowl noted. Labial seal is additionally noted to be mildly weak for suction via straw w/ repeated attempts made prior to obtaining bolus via straw. She denies increased effort or weakness. Bolus formation, manipulation, and control appeared to be mildly weak  and prolonged overall w/ prolonged mastication of solid consistency only and mixed phasic-rotary mastication pattern demonstrated. A-p transit was timely w/o significant oral residue appreciated. No overt s/s aspiration were evidenced before the swallow.     Pharyngeal swallow was timely w/ adequate hyolaryngeal elevation per palpation. No overt s/s aspiration evidenced across this evaluation. No silent aspiration suspected. She denied odynophagia.      Visit Dx:     ICD-10-CM ICD-9-CM   1. Nausea and vomiting, unspecified vomiting type  R11.2 787.01   2. Dehydration  E86.0 276.51   3. Debility  R53.81 799.3     Patient Active Problem List   Diagnosis    Cystocele    Diverticulosis of large intestine    Hemorrhoids    Hyperlipidemia    Postoperative hypothyroidism    Impaired fasting glucose    Osteoporosis    Rectocele    Trigeminal neuralgia    Anxiety    Vomiting     Past Medical History:   Diagnosis Date    Anxiety     Hyperlipidemia     Trigeminal neuralgia      Past Surgical History:   Procedure Laterality Date    BREAST SURGERY      HYSTERECTOMY         Impression:     Ms Laws presented w/ concerns for a mild oral dysphagia w/ weak labial seal evidenced w/ some oral loss, mildly prolonged bolus formation and manipulation, and increased mastication of solid consistency only. Pharyngeal swallow was felt to be wfl.     Per this evaluation's findings w/ observed weakness during bolus prep, Ms  Veto is felt to most benefit from diet initiation of soft to chew textures, chopped meats, and thin liquids. She is further felt to benefit from assistance w/ feeding per observed limitations related to self-provision across this evaluation despite Ms Laws reporting she is able to perform this task. Medications are recommended to be administered whole in puree/thins or crushed/broken PRN. Recommend diet to be advanced as Ms Laws tolerates w/ improvement in acute status.     SLP Recommendation and Plan     1. Mechanical soft textures, chopped meats, thin liquids   2. Medications whole in puree/thins. Crushed/broken PRN.  3. Upright and centered for all po intake  4. FLAVIA precautions.  5. Oral care protocol.  6. 1:1 assistance w/ po intake.   7. Universal aspiration precautions.   8. Advance po diet as tolerated.     No further formal SLP f/u warranted/recommended at this time. Diet to be advanced as pt tolerates.     D/w patient results and recommendations w/ verbal agreement.    D/w MD results and recommendations w/ verbal agreement.    Thank you for allowing me to participate in the care of your patient-  Jennifer Devlin M.S., CCC-SLP        EDUCATION  The patient has been educated in the following areas:   Dysphagia (Swallowing Impairment) Oral Care/Hydration.     Time Calculation:       Therapy Charges for Today       Code Description Service Date Service Provider Modifiers Qty    20785823955  ST EVAL ORAL PHARYNG SWALLOW 4 1/23/2024 Jennifer Devlin MS CCC-SLP GN 1                 Jennifer Devlin MS CCC-SLP  1/23/2024    Electronically signed by Jennifer Devlin MS CCC-SLP at 01/23/24 5883       ADL Documentation (most recent)      Flowsheet Row Most Recent Value   Transferring 2 - assistive person   Toileting 2 - assistive person   Bathing 2 - assistive person   Dressing 2 - assistive person   Eating 2 - assistive person   Communication 2 - difficulty speaking (not related to language barrier)   Swallowing 0 - swallows  foods/liquids without difficulty   Equipment Currently Used at Home none            Discharge Summary    No notes of this type exist for this encounter.       Discharge Order (From admission, onward)      None

## 2024-01-30 LAB
BACTERIA SPEC AEROBE CULT: NORMAL
BACTERIA SPEC AEROBE CULT: NORMAL
GLUCOSE BLDC GLUCOMTR-MCNC: 103 MG/DL (ref 70–130)
GLUCOSE BLDC GLUCOMTR-MCNC: 136 MG/DL (ref 70–130)
GLUCOSE BLDC GLUCOMTR-MCNC: 152 MG/DL (ref 70–130)
GLUCOSE BLDC GLUCOMTR-MCNC: 159 MG/DL (ref 70–130)

## 2024-01-30 PROCEDURE — 82948 REAGENT STRIP/BLOOD GLUCOSE: CPT

## 2024-01-30 PROCEDURE — 25010000002 CEFTRIAXONE PER 250 MG: Performed by: NURSE PRACTITIONER

## 2024-01-30 PROCEDURE — 63710000001 INSULIN LISPRO (HUMAN) PER 5 UNITS: Performed by: INTERNAL MEDICINE

## 2024-01-30 PROCEDURE — 25010000002 ONDANSETRON PER 1 MG: Performed by: INTERNAL MEDICINE

## 2024-01-30 PROCEDURE — 99232 SBSQ HOSP IP/OBS MODERATE 35: CPT | Performed by: NURSE PRACTITIONER

## 2024-01-30 PROCEDURE — 25010000002 HEPARIN (PORCINE) PER 1000 UNITS: Performed by: INTERNAL MEDICINE

## 2024-01-30 PROCEDURE — 97535 SELF CARE MNGMENT TRAINING: CPT

## 2024-01-30 PROCEDURE — 97530 THERAPEUTIC ACTIVITIES: CPT

## 2024-01-30 PROCEDURE — 99232 SBSQ HOSP IP/OBS MODERATE 35: CPT | Performed by: INTERNAL MEDICINE

## 2024-01-30 RX ADMIN — MEGESTROL ACETATE 200 MG: 40 SUSPENSION ORAL at 20:45

## 2024-01-30 RX ADMIN — ATORVASTATIN CALCIUM 20 MG: 20 TABLET, FILM COATED ORAL at 08:52

## 2024-01-30 RX ADMIN — Medication 10 ML: at 20:45

## 2024-01-30 RX ADMIN — HEPARIN SODIUM 5000 UNITS: 5000 INJECTION INTRAVENOUS; SUBCUTANEOUS at 08:51

## 2024-01-30 RX ADMIN — BUPROPION HYDROCHLORIDE 150 MG: 150 TABLET, EXTENDED RELEASE ORAL at 08:52

## 2024-01-30 RX ADMIN — SODIUM CHLORIDE 2000 MG: 9 INJECTION, SOLUTION INTRAVENOUS at 12:14

## 2024-01-30 RX ADMIN — DOCUSATE SODIUM 50 MG AND SENNOSIDES 8.6 MG 2 TABLET: 8.6; 5 TABLET, FILM COATED ORAL at 20:45

## 2024-01-30 RX ADMIN — DOCUSATE SODIUM 50 MG AND SENNOSIDES 8.6 MG 2 TABLET: 8.6; 5 TABLET, FILM COATED ORAL at 08:52

## 2024-01-30 RX ADMIN — INSULIN LISPRO 2 UNITS: 100 INJECTION, SOLUTION INTRAVENOUS; SUBCUTANEOUS at 17:10

## 2024-01-30 RX ADMIN — PANTOPRAZOLE SODIUM 40 MG: 40 TABLET, DELAYED RELEASE ORAL at 05:30

## 2024-01-30 RX ADMIN — ACETAMINOPHEN 650 MG: 325 TABLET ORAL at 23:17

## 2024-01-30 RX ADMIN — MEGESTROL ACETATE 200 MG: 40 SUSPENSION ORAL at 08:52

## 2024-01-30 RX ADMIN — ONDANSETRON 4 MG: 2 INJECTION INTRAMUSCULAR; INTRAVENOUS at 23:17

## 2024-01-30 RX ADMIN — MEGESTROL ACETATE 200 MG: 40 SUSPENSION ORAL at 14:11

## 2024-01-30 RX ADMIN — Medication 10 ML: at 08:52

## 2024-01-30 RX ADMIN — HEPARIN SODIUM 5000 UNITS: 5000 INJECTION INTRAVENOUS; SUBCUTANEOUS at 20:45

## 2024-01-30 RX ADMIN — GABAPENTIN 400 MG: 400 CAPSULE ORAL at 20:46

## 2024-01-30 RX ADMIN — LORAZEPAM 2 MG: 2 TABLET ORAL at 20:45

## 2024-01-30 RX ADMIN — INSULIN LISPRO 2 UNITS: 100 INJECTION, SOLUTION INTRAVENOUS; SUBCUTANEOUS at 20:45

## 2024-01-30 NOTE — PLAN OF CARE
Goal Outcome Evaluation:  Plan of Care Reviewed With: patient        Progress: no change  Outcome Evaluation: Pt's resting in bed, alert with confusion, stable on RA, no c/o, no s/s of distress noted. Pt worked with PT, sat up in chair. Con't IV ABX/BS control. Will con't with POC.

## 2024-01-30 NOTE — PROGRESS NOTES
PROGRESS NOTE         Patient Identification:  Name:  Leonie Laws  Age:  83 y.o.  Sex:  female  :  1940  MRN:  [id number]  Visit Number:  [id number]  Primary Care Provider:  Joe Bowden MD         LOS: 8 days       ----------------------------------------------------------------------------------------------------------------------  Subjective       Chief Complaints:    Vomiting and Weakness - Generalized        Interval History:      The patient is awake and alert, sitting up eating breakfast.  On room air with no apparent distress.  Nurse at the bedside reports no new events overnight.  Afebrile.  No reports of diarrhea.  Lung exam is clear to auscultation bilaterally.  Abdomen soft and nontender with normoactive bowel sounds.  Blood cultures from 2024 preliminarily report no growth at 4 days.      Review of Systems:    Constitutional: no fever, chills and night sweats.  Generalized fatigue.  Eyes: no eye drainage, itching or redness.  HEENT: no mouth sores, dysphagia or nose bleed.  Respiratory: no for shortness of breath, cough or production of sputum.  Cardiovascular: no chest pain, no palpitations, no orthopnea.  Gastrointestinal: no nausea, vomiting or diarrhea. No abdominal pain, hematemesis or rectal bleeding.  Genitourinary: no dysuria or polyuria.  Hematologic/lymphatic: no lymph node abnormalities, no easy bruising or easy bleeding.  Musculoskeletal: no muscle or joint pain.  Skin: No rash and no itching.  Neurological: no loss of consciousness, no seizure, no headache.  Behavioral/Psych: no depression or suicidal ideation.  Endocrine: no hot flashes.  Immunologic: negative.    ----------------------------------------------------------------------------------------------------------------------      Objective       \[facility] [place] [facility]\"" Meds:  atorvastatin, 20 mg, Oral, Daily  buPROPion XL, 150 mg, Oral, Daily  cefTRIAXone, 2,000 mg, Intravenous, Q24H  gabapentin, 400 mg,  Oral, Nightly  heparin (porcine), 5,000 Units, Subcutaneous, Q12H  insulin lispro, 2-7 Units, Subcutaneous, 4x Daily AC & at Bedtime  LORazepam, 2 mg, Oral, Nightly  megestrol, 200 mg, Oral, TID  pantoprazole, 40 mg, Oral, Q AM  senna-docusate sodium, 2 tablet, Oral, BID  sodium chloride, 10 mL, Intravenous, Q12H           ----------------------------------------------------------------------------------------------------------------------    Vital Signs:  Temp:  [97.8 °F (36.6 °C)-98.7 °F (37.1 °C)] 98.5 °F (36.9 °C)  Heart Rate:  [] 88  Resp:  [16-18] 16  BP: (120-169)/(63-84) 120/63  Mean Arterial Pressure (Non-Invasive) for the past 24 hrs (Last 3 readings):   Noninvasive MAP (mmHg)   01/30/24 0600 82   01/29/24 1437 95     SpO2 Percentage    01/29/24 1022 01/29/24 1437 01/30/24 0600   SpO2: 95% 95% 97%     SpO2:  [95 %-97 %] 97 %  on   ;   Device (Oxygen Therapy): room air    Body mass index is 18.98 kg/m².  Wt Readings from Last 3 Encounters:   01/22/24 56.6 kg (124 lb 12.8 oz)   06/25/20 66.5 kg (146 lb 9.6 oz)   05/04/20 70.1 kg (154 lb 9.6 oz)        Intake/Output Summary (Last 24 hours) at 1/30/2024 1034  Last data filed at 1/30/2024 0910  Gross per 24 hour   Intake 530 ml   Output --   Net 530 ml     Diet: Regular/House Diet; Feeding Assistance - Nursing; Texture: Soft to Chew (NDD 3); Soft to Chew: Chopped Meat; Fluid Consistency: Thin (IDDSI 0)  ----------------------------------------------------------------------------------------------------------------------      Physical Exam:    Constitutional: Elderly and chronically ill-appearing.  Awake and alert, sitting up eating breakfast in bed.  On room air with no apparent distress.  HENT:  Head: Normocephalic and atraumatic.  Mouth:  Moist mucous membranes.    Eyes:  Conjunctivae and EOM are normal.  No scleral icterus.  Neck:  Neck supple.  No JVD present.    Cardiovascular:  Normal rate, regular rhythm and normal heart sounds with no murmur. No  "edema.  Pulmonary/Chest:  No respiratory distress, no wheezes, no crackles, with normal breath sounds and good air movement.  On room air.    Abdominal:  Soft.  Bowel sounds are normal.  No distension and no tenderness.   Musculoskeletal:  No edema, no tenderness, and no deformity.  No swelling or redness of joints.  Neurological:  Alert and oriented to person and place, slightly confused, sleepy  Skin:  Skin is warm and dry.  No rash noted.  No pallor.           ----------------------------------------------------------------------------------------------------------------------              Results from last 7 days   Lab Units 01/29/24  0146 01/28/24  0304 01/27/24  0204   WBC 10*3/mm3 10.59 10.51 12.30*   HEMOGLOBIN g/dL 9.6* 8.7* 8.9*   HEMATOCRIT % 28.4* 26.0* 26.9*   MCV fL 87.4 88.7 88.2   MCHC g/dL 33.8 33.5 33.1   PLATELETS 10*3/mm3 504* 418 381     Results from last 7 days   Lab Units 01/29/24  0146 01/28/24  0304 01/27/24  0204   SODIUM mmol/L 138 138 135*   POTASSIUM mmol/L 3.8 3.4* 3.0*   MAGNESIUM mg/dL  --   --  1.9   CHLORIDE mmol/L 105 107 104   CO2 mmol/L 24.1 23.4 24.3   BUN mg/dL 5* 5* 6*   CREATININE mg/dL 0.64 0.62 0.62   CALCIUM mg/dL 8.6 8.1* 8.1*   GLUCOSE mg/dL 104* 135* 122*   Estimated Creatinine Clearance: 60.6 mL/min (by C-G formula based on SCr of 0.64 mg/dL).  No results found for: \"AMMONIA\"    Glucose   Date/Time Value Ref Range Status   01/30/2024 0637 103 70 - 130 mg/dL Final   01/29/2024 2025 152 (H) 70 - 130 mg/dL Final   01/29/2024 1639 146 (H) 70 - 130 mg/dL Final   01/29/2024 1043 104 70 - 130 mg/dL Final   01/29/2024 0639 84 70 - 130 mg/dL Final   01/28/2024 2034 123 70 - 130 mg/dL Final   01/28/2024 1621 120 70 - 130 mg/dL Final   01/28/2024 1057 148 (H) 70 - 130 mg/dL Final     Lab Results   Component Value Date    HGBA1C 5.90 (H) 01/22/2024     Lab Results   Component Value Date    TSH 0.897 01/22/2024    FREET4 1.46 12/22/2016       Blood Culture   Date Value Ref Range " "Status   01/22/2024 Streptococcus pyogenes (Group A) (C)  Preliminary   01/22/2024 Streptococcus pyogenes (Group A) (C)  Final     No results found for: \"URINECX\"  No results found for: \"WOUNDCX\"  No results found for: \"STOOLCX\"  No results found for: \"RESPCX\"  Pain Management Panel           No data to display                  ----------------------------------------------------------------------------------------------------------------------  Imaging Results (Last 24 Hours)       ** No results found for the last 24 hours. **            ----------------------------------------------------------------------------------------------------------------------    Pertinent Infectious Disease Results      Assessment/Plan       Assessment     Severe sepsis with lactic acid greater than 2 on admission  Streptococcus pyogenes bacteremia        Plan      The patient is awake and alert, sitting up eating breakfast.  On room air with no apparent distress.  Nurse at the bedside reports no new events overnight.  Afebrile.  No reports of diarrhea.  Lung exam is clear to auscultation bilaterally.  Abdomen soft and nontender with normoactive bowel sounds.  Blood cultures from 1/25/2024 preliminarily report no growth at 4 days.    Recommend to continue with ceftriaxone 2 g IV 24 hours for Streptococcus pyogenes bacteremia.  The patient will require 14-day antibiotic course from the first negative set of blood cultures, tentative end date 2/8/2024.  In the setting of significant clinical improvement, we are agreeable to transition to oral Omnicef course at the time of discharge to continue for total of 14 days, tentatively through 2/8/2024.  We will plan to follow-up in the outpatient infectious disease clinic for blood cultures x 2 after discharge.      ANTIMICROBIAL THERAPY    cefTRIAXone (ROCEPHIN) 2000 mg IVPB in 100 mL NS (VTB)     Code Status:   Code Status and Medical Interventions:   Ordered at: 01/22/24 2049     Medical " Intervention Limits:    NO intubation (DNI)     Code Status (Patient has no pulse and is not breathing):    No CPR (Do Not Attempt to Resuscitate)     Medical Interventions (Patient has pulse or is breathing):    Limited Support       Liana Snyder, APRN  01/30/24  10:34 EST

## 2024-01-30 NOTE — THERAPY TREATMENT NOTE
Acute Care - Physical Therapy Treatment Note   [facility]     Patient Name: Leonie Laws  : 1940  MRN: [id number]  Today's Date: 2024   Onset of Illness/Injury or Date of Surgery: 24 (admission date)  Visit Dx:     ICD-10-CM ICD-9-CM   1. Nausea and vomiting, unspecified vomiting type  R11.2 787.01   2. Dehydration  E86.0 276.51   3. Debility  R53.81 799.3     Patient Active Problem List   Diagnosis    Cystocele    Diverticulosis of large intestine    Hemorrhoids    Hyperlipidemia    Postoperative hypothyroidism    Impaired fasting glucose    Osteoporosis    Rectocele    Trigeminal neuralgia    Anxiety    Vomiting     Past Medical History:   Diagnosis Date    Anxiety     Hyperlipidemia     Trigeminal neuralgia      Past Surgical History:   Procedure Laterality Date    BREAST SURGERY      HYSTERECTOMY       PT Assessment (last 12 hours)       PT Evaluation and Treatment       Row Name 24 1116          Physical Therapy Time and Intention    Document Type therapy note (daily note)  -KM     Mode of Treatment physical therapy  -KM     Patient Effort adequate  -KM     Symptoms Noted During/After Treatment fatigue  -KM       Row Name 24 1116          General Information    Patient Profile Reviewed yes  -KM     Patient Observations alert;cooperative;agree to therapy  -KM     Existing Precautions/Restrictions fall  -KM       Row Name 24 1116          Cognition    Affect/Mental Status (Cognition) flat/blunted affect  -KM     Follows Commands (Cognition) WFL  -KM       Row Name 24 1116          Bed Mobility    Bed Mobility supine-sit  -KM     Supine-Sit [place] (Bed Mobility) moderate assist (50% patient effort)  -KM     Bed Mobility, Safety Issues decreased use of arms for pushing/pulling;decreased use of legs for bridging/pushing;impaired trunk control for bed mobility  -KM     Assistive Device (Bed Mobility) head of bed elevated;draw sheet  -KM       Row Name 24 1116           Transfers    Transfers sit-stand transfer;stand-sit transfer;toilet transfer  -[facility] Name 01/30/24 1116          Sit-Stand Transfer    Sit-Stand [place] (Transfers) minimum assist (75% patient effort);moderate assist (50% patient effort)  -[facility] Name 01/30/24 1116          Stand-Sit Transfer    Stand-Sit [place] (Transfers) minimum assist (75% patient effort);moderate assist (50% patient effort)  -[facility] Name 01/30/24 1116          Toilet Transfer    Type (Toilet Transfer) stand pivot/stand step  -     [place] Level (Toilet Transfer) moderate assist (50% patient effort)  -       Row Name 01/30/24 1116          Gait/Stairs (Locomotion)    Comment, (Gait/Stairs) pt. took 3-4 pivot steps from bed-commode  -[facility] Name 01/30/24 1116          Safety Issues, Functional Mobility    Impairments Affecting Function (Mobility) endurance/activity tolerance;strength  -KM       Row Name 01/30/24 1116          Progress Summary (PT)    Daily Progress Summary (PT) Pt. was able to perform bed mobility w/ modA. She performed transfers w/ modA. She tolerated session well w/ complaints of fatigue. Pt. would continue to benefit from skilled PT services.  -KM               User Key  (r) = Recorded By, (t) = Taken By, (c) = Cosigned By      Initials Name Provider Type    Willy Guadarrama, PT Physical Therapist                      PT Recommendation and Plan     Progress Summary (PT)  Daily Progress Summary (PT): Pt. was able to perform bed mobility w/ modA. She performed transfers w/ modA. She tolerated session well w/ complaints of fatigue. Pt. would continue to benefit from skilled PT services.       Time Calculation:    PT Charges       Row Name 01/30/24 1116             Time Calculation    PT Received On 01/30/24  -KM         Time Calculation- PT    Total Timed Code Minutes- PT 15 minute(s)  -KM                User Key  (r) = Recorded By, (t) = Taken By, (c) = Cosigned By      Initials  Name Provider Type     Willy Moulton, PT Physical Therapist                  Therapy Charges for Today       Code Description Service Date Service Provider Modifiers Qty    15872017790 HC PT THERAPEUTIC ACT EA 15 MIN 1/30/2024 Willy Moulton, PT GP 1            PT G-Codes  AM-PAC 6 Clicks Score (PT): 8    Willy Moulton, PT  1/30/2024

## 2024-01-30 NOTE — CASE MANAGEMENT/SOCIAL WORK
Discharge Planning Assessment  [facility]     Patient Name: Leonie Laws  MRN: [id number]  Today's Date: 1/30/2024    Admit Date: 1/22/2024     Discharge Plan       Row Name 01/30/24 1212       Plan    Plan Pt has an inpatient rehab consult. SS discussed pt with [facility] inpatient rehab per Simon and pt does not qualify for inpatient rehab at this time. SS received a message from Ruma at [facility]. Ruma wants to know if pt is short-term rehab or long-term care. SS contacted Guardian/son Yash to discuss discharge plan. Son voices initially pt will be SNF placement and could potentially need long-term care placement depending on her progress. Son notified that pt does not qualify for [facility] inpatient rehab at this time. Son voices understanding. SS notified Ruma at [facility] of Guardian/son's plan. Pt has been approved for admission by [facility] pending [facility] pre-authorization request approval. Manager started SNF pre-auth request this morning and SS notified [facility] per Ruma. SS to follow.    Addendum @ 15:55: Yash Mtz provided Emergency Guardianship papers. SNF pre-auth request has been approved with clinical updates due on 2/1/24. SS notified [facility] per Ruma and bed will be available on 1/31/24. SS notified Yash mtz. SS will notify Dr. Saravia. SS to follow.                   Continued Care and Services - Admitted Since 1/22/2024       Destination       Service Provider Request Status Selected Services Address Phone Fax Patient Preferred    [facility] Pending - Request Sent N/A [address] [id number] [phone] --                  Expected Discharge Date and Time       Expected Discharge Date Expected Discharge Time    Jan 30, 2024         EUGENE Cristobal

## 2024-01-30 NOTE — PROGRESS NOTES
[facility] HOSPITALIST PROGRESS NOTE     Patient Identification:  Name:  Leonie Laws  Age:  83 y.o.  Sex:  female  :  1940  MRN:  [id number]  Visit Number:  [id number]  ROOM: [address] [place] [address]     Primary Care Provider:  Joe Bowden MD    Length of stay in inpatient status:  8    Subjective     Chief Compliant:    Chief Complaint   Patient presents with    Vomiting    Weakness - Generalized     History of Presenting Illness:    Patient remains ill but stable today, no acute events overnight, no new complaints, denies any fevers or chills, continued on antibiotics, per Social Work are looking for rehab placement following conversations with patient's son, patient without new complaints today, didn't seem that supportive of going to rehab but also does not remember having conversation with me from the day prior about her care.   Objective     Current Hospital Meds:  atorvastatin, 20 mg, Oral, Daily  buPROPion XL, 150 mg, Oral, Daily  cefTRIAXone, 2,000 mg, Intravenous, Q24H  gabapentin, 400 mg, Oral, Nightly  heparin (porcine), 5,000 Units, Subcutaneous, Q12H  insulin lispro, 2-7 Units, Subcutaneous, 4x Daily AC & at Bedtime  LORazepam, 2 mg, Oral, Nightly  megestrol, 200 mg, Oral, TID  pantoprazole, 40 mg, Oral, Q AM  senna-docusate sodium, 2 tablet, Oral, BID  sodium chloride, 10 mL, Intravenous, Q12H    ----------------------------------------------------------------------------------------------------------------------  Vital Signs:  Temp:  [97.8 °F (36.6 °C)-98.7 °F (37.1 °C)] 98.5 °F (36.9 °C)  Heart Rate:  [] 93  Resp:  [16-18] 16  BP: (101-169)/(57-84) 101/57  SpO2:  [95 %-97 %] 95 %  on   ;   Device (Oxygen Therapy): room air  Body mass index is 18.98 kg/m².      Intake/Output Summary (Last 24 hours) at 2024 1100  Last data filed at 2024 0943  Gross per 24 hour   Intake 530 ml   Output --   Net 530 ml     "  ----------------------------------------------------------------------------------------------------------------------  Physical exam:  Constitutional:  Elderly, No acute distress.      HENT:  Head:  Normocephalic and atraumatic.  Mouth:  Moist mucous membranes.    Eyes:  Conjunctivae and EOM are normal. No scleral icterus.    Neck:  Neck supple.  No JVD present.    Cardiovascular:  Normal rate, regular rhythm and normal heart sounds with no murmur.  Pulmonary/Chest:  No respiratory distress, no wheezes, no crackles, with normal breath sounds and good air movement.  Abdominal:  Soft. No distension and no tenderness.   Musculoskeletal:  No tenderness, and no deformity.  No red or swollen joints anywhere.    Neurological:  Alert and oriented to person, place.  No gross focal deficits     Skin:  Skin is warm and dry. No rash noted. No pallor.   Peripheral vascular:  No clubbing, no cyanosis, no edema.  Psychiatric: Appropriate mood and affect    Edited by: Agusto Saravia MD at 1/30/2024 1058  ----------------------------------------------------------------------------------------------------------------------     No results found for: \"URINECX\"  Blood Culture   Date Value Ref Range Status   01/25/2024 No growth at 4 days  Preliminary   01/25/2024 No growth at 4 days  Preliminary     I have personally looked at the labs and they are summarized above.  ----------------------------------------------------------------------------------------------------------------------  Detailed radiology reports for the last 24 hours:  No radiology results for the last day  Assessment & Plan    #Severe Sepsis & due to Pneumonia, Bacterial, treating for aspiration/pneumonitis in setting of nausea/vomiting, though also with noted Strep Pyogenes Bacteremia  - Patient met SIRS criteria due to heart rate > 90, WBC count > 12K  - Due to lactate > 2 patient met criteria for Severe Sepsis  - Infectious Disease consulted and following   - " Continue Ceftriaxone for now  - Will need antibiotics through 2/8 per Infectious Disease, can transition to oral antibiotics upon discharge  - Continue Tylenol as needed for fevers, monitor on telemetry, monitor for clinical improvement     #Non-Insulin Dependent Diabetes Mellitus Type II, controlled, unknown complications  - Holding home oral agents, continue FSBG and SSI, add long acting as indicated.    #Hypothyroid  - Continue home Levothyroxine    #Anxiety/Depression  - Continue home regimen     #Gastroesophageal Reflux Disease  - Continue home PPI    #Dementia  - No home medications, supportive care    F: Oral  E: Monitor & Replace as needed   N: Diet: Regular/House Diet; Feeding Assistance - Nursing; Texture: Soft to Chew (NDD 3); Soft to Chew: Chopped Meat; Fluid Consistency: Thin (IDDSI 0)   Ppx: SQH   Code Status (Patient has no pulse and is not breathing): No CPR   Medical Interventions (Patient has pulse or is breathing): Limited Support  Medical Intervention Limits: NO intubation (DNI)     Dispo: Pending clinical improvement, PT/OT consulted and following, Social Work consulted and following for placement      *This patient is considered high risk due to sepsis, Pneumonia, bacteremia.    Edited by: Agusto Saravia MD at 1/30/2024 [address] [place] [address]

## 2024-01-30 NOTE — THERAPY TREATMENT NOTE
Acute Care - Occupational Therapy Treatment Note   [facility]     Patient Name: Leonie Laws  : 1940  MRN: [id number]  Today's Date: 2024  Onset of Illness/Injury or Date of Surgery: 24 (admission date)          Admit Date: 2024       ICD-10-CM ICD-9-CM   1. Nausea and vomiting, unspecified vomiting type  R11.2 787.01   2. Dehydration  E86.0 276.51   3. Debility  R53.81 799.3     Patient Active Problem List   Diagnosis    Cystocele    Diverticulosis of large intestine    Hemorrhoids    Hyperlipidemia    Postoperative hypothyroidism    Impaired fasting glucose    Osteoporosis    Rectocele    Trigeminal neuralgia    Anxiety    Vomiting     Past Medical History:   Diagnosis Date    Anxiety     Hyperlipidemia     Trigeminal neuralgia      Past Surgical History:   Procedure Laterality Date    BREAST SURGERY      HYSTERECTOMY           OT ASSESSMENT FLOWSHEET (last 12 hours)       OT Evaluation and Treatment       Row Name 24 1035                   OT Time and Intention    Document Type therapy note (daily note)  -KR        Mode of Treatment occupational therapy  -KR        Patient Effort adequate  -KR           Cognition    Affect/Mental Status (Cognition) flat/blunted affect  -KR        Follows Commands (Cognition) WFL  -KR           Toileting Assessment/Training    [place] Level (Toileting) toileting skills;moderate assist (50% patient effort);maximum assist (25% patient effort)  -KR           Toilet Transfer    Type (Toilet Transfer) stand pivot/stand step  -KR        [place] Level (Toilet Transfer) moderate assist (50% patient effort)  -KR           Plan of Care Review    Plan of Care Reviewed With patient  -KR           Therapy Assessment/Plan (OT)    Comment, Therapy Assessment/Plan (OT) Pt displayed increased cooperation with activity on this date. Fxl mobility/transfer training performed to enhance fxl status/endurance. OT to continue as tolerated.  -KR           Progress  Summary (OT)    Progress Toward Functional Goals (OT) progress toward functional goals as expected  -BRANDON                  User Key  (r) = Recorded By, (t) = Taken By, (c) = Cosigned By      Initials Name Effective Dates    KR Fabrizio Ramey OT 06/16/21 -                            OT Recommendation and Plan  Planned Therapy Interventions (OT): activity tolerance training, strengthening exercise, BADL retraining, transfer/mobility retraining  Progress Toward Functional Goals (OT): progress toward functional goals as expected  Plan of Care Review  Plan of Care Reviewed With: patient  Plan of Care Reviewed With: patient        Time Calculation:     Therapy Charges for Today       Code Description Service Date Service Provider Modifiers Qty    33056939649 HC OT THERAPEUTIC ACT EA 15 MIN 1/29/2024 Fabrizio Ramey OT GO 1    30869288999 HC OT SELF CARE/MGMT/TRAIN EA 15 MIN 1/29/2024 Fabrizio Ramey OT GO 2    16103434094 HC OT SELF CARE/MGMT/TRAIN EA 15 MIN 1/30/2024 Fabrizio Ramey OT GO 1                 Fabrizio Ramey OT  1/30/2024   02-Sep-2020 15:13

## 2024-01-30 NOTE — DISCHARGE PLACEMENT REQUEST
"Leonie Laws (83 y.o. Female)       Date of Birth   1940    Social Security Number       Address   [address]  [facility] [address]    Home Phone   [phone]    MRN   [id number]       Druze   Unknown    Marital Status                               Admission Date   1/22/24    Admission Type   Emergency    Admitting Provider   Kinjal Beach DO    Attending Provider   Agusto Saravia MD    Department, Room/Bed   [address], [address]/       Discharge Date       Discharge Disposition       Discharge Destination                                 Attending Provider: Agusto Saravia MD    Allergies: Doxycycline, Gentamycin [Gentamicin], Miconazole, Penicillins, Sulfa Antibiotics    Isolation: None   Infection: None   Code Status: No CPR    Ht: 172.7 cm (68\")   Wt: 56.6 kg (124 lb 12.8 oz)    Admission Cmt: None   Principal Problem: Vomiting [R11.10]                   Active Insurance as of 1/22/2024       Primary Coverage       Payor Plan Insurance Group Employer/Plan Group    HUMANA MEDICARE REPLACEMENT HUMANA MED ADV GROUP [id number]       Payor Plan Address Payor Plan Phone Number Payor Plan Fax Number Effective Dates    [address] [address] [phone]  1/1/2015 - None Entered    [facility] [address]         Subscriber Name Subscriber Birth Date Member ID       LEONIE LAWS 1940 [id number]                     Emergency Contacts        (Rel.) Home Phone Work Phone Mobile Phone    Yash Bond (Son) -- -- [phone]    VetoJusten (Spouse) [phone] -- --    Giovanny Bond (Son) [phone] -- [phone]                 History & Physical        Khalida Castro PA-C at 01/1940       Attestation signed by Kinjal Beach DO at 01/22/24 2989    I have reviewed this documentation and patient also briefly seen and examined at bedside in .  Patient's , son, daughter-in-law and granddaughter are also present at bedside in " addition to GLORIA Shen.    Patient with intermittent episodes of significant right lower extremity tremors versus seizure-like activity.  For this reason, the patient has remained in the ED and is currently connected to [facility] with a 0% seizure burden; will continue for minimum of 1 hours prior to transfer to the floor; d/w GLORIA Shen who verbalized understandin.     Patient does not answer questions at this time.  Patient appears significantly pale but is in no acute distress.  Significant lower extremity edema, no abdominal tenderness palpation, bowel sounds are slightly hypoactive, lungs are diminished at bilateral bases but otherwise clear to auscultation anteriorly.  Heart was regular rate without obvious murmur.    Patient now noted to have a temperature of 101.3 °F which makes aspiration pneumonia versus aspiration pneumonitis higher on the differential in the patient with significant vomiting. Hyponatremia likely hypovolemic and due to decreased PO intake/GI losses. Patient to be started on IV fluids as noted below.    -For now, patient will be continued on empiric antibiotic therapy with vancomycin, Azactam and Flagyl pending final blood culture results  -Supportive care with Tylenol as needed  -Aspiration precautions with bedside nurse dysphagia screen; consider SLP consult  -Patient will be be started on NS @ 100 ml/hr  -PRN IV Zofran  -Check TSH, Mg, Vitamin B12 and folate  -PT and OT consults; CM/ consult for possible placement                           [facility]  HISTORY & PHYSICAL    Patient Identification:  Name:  Leonie Laws  Age:  83 y.o.  Sex:  female  :  1940  MRN:  [id number]   Visit Number:  [id number]  Admit Date: 2024   Primary Care Physician:  Joe Bowden MD     Subjective     Chief complaint:   Chief Complaint   Patient presents with    Vomiting    Weakness - Generalized     History of presenting illness:   Patient  is a 83 y.o. female with past medical history significant for anxiety, hyperlipidemia, trigeminal neuralgia, hypothyroidism that presented to the [facility] emergency department for evaluation of generalized weakness.     Patient examined at bedside in ED.  Numerous family members at bedside.  Patient's  states that the patient has been getting progressively weaker over the past several weeks.  Has had poor p.o. intake as well.  He notes that if he was able to get her to eat and drink, then she would because patient seems to be less weak, but he has trouble getting her to comply.  He notes that the nausea and vomiting began yesterday.  Son states patient has been exhibiting signs of dementia for the last couple years, but patient is very offended by any mention of possibly having dementia.  Son states it is difficult to get her to seek care, patient felt so ill that she was going to come to the ER.  Son states that last night she appeared to have aspirated on her vomit and has had an intermittent cough since then.  Mentation appears to be waxing and waning on exam.  Patient was oriented to self, but not location or time.  She was oriented x 3 earlier in the ED.  After my initial exam when I stepped out of the room to speak with family members, son brought to my attention that the patient was having rhythmic jerking of her right lower extremity he states has been intermittently happening over the past hour or so.    Upon arrival to the ED, vitals were temperature 98.4, , RR 14, /75, SpO2 98% on room air.  Labs significant for corrected sodium 130, glucose 233.  Hemoglobin A1c 5.9.  CRP 25.25, lactate 2.2, WBC 18.79 with left shift.  Chest x-ray shows left basilar atelectasis versus pneumonia.  CT abdomen/pelvis shows no acute identified findings.  Moderate constipation.  Bibasilar airspace disease most consistent with atelectasis.  CT head with no acute findings.    Patient has been  admitted to 3N.  ---------------------------------------------------------------------------------------------------------------------   Review of Systems   Unable to perform ROS: Dementia      ---------------------------------------------------------------------------------------------------------------------   Past Medical History:   Diagnosis Date    Anxiety     Hyperlipidemia     Trigeminal neuralgia      Past Surgical History:   Procedure Laterality Date    BREAST SURGERY      HYSTERECTOMY       Family History   Problem Relation Age of Onset    Stroke Mother     Heart disease Father      Social History     Socioeconomic History    Marital status:    Tobacco Use    Smoking status: Never    Smokeless tobacco: Never   Substance and Sexual Activity    Alcohol use: No    Drug use: No     ---------------------------------------------------------------------------------------------------------------------   Allergies:  Doxycycline, Gentamycin [gentamicin], Miconazole, Penicillins, and Sulfa antibiotics  ---------------------------------------------------------------------------------------------------------------------   Medications below are reported home medications pulling from within the system; at this time, these medications have not been reconciled unless otherwise specified and are in the verification process for further verifcation as current home medications.    Prior to Admission Medications       Prescriptions Last Dose Informant Patient Reported? Taking?    Cholecalciferol (VITAMIN D-3) 1000 UNITS capsule   Yes No    Take 1 capsule by mouth.    estradiol (CLIMARA) 0.05 MG/24HR patch   No No    place one PATCH ON THE SKIN AS DIRECTED by provider one time PER WEEK    estradiol (MINIVELLE, VIVELLE-DOT) 0.05 MG/24HR patch   No No    Place 1 patch on the skin as directed by provider 1 (One) Time Per Week.    gabapentin (Neurontin) 400 MG capsule   No No    Take 1 capsule by mouth 2 (Two) Times a Day.     LORazepam (ATIVAN) 2 MG tablet   No No    Take 1 tablet by mouth At Night As Needed for Anxiety.    olopatadine (PATANOL) 0.1 % ophthalmic solution   No No    Administer 1 drop to both eyes 2 (Two) Times a Day.    SYNTHROID 100 MCG tablet   No No    Take 1 tablet by mouth Daily.          ---------------------------------------------------------------------------------------------------------------------    Objective     Hospital Scheduled Meds:            Current listed hospital scheduled medications may not yet reflect those currently placed in orders that are signed and held, awaiting patient's arrival to floor/unit.    ---------------------------------------------------------------------------------------------------------------------   Vital Signs:  Temp:  [98.4 °F (36.9 °C)] 98.4 °F (36.9 °C)  Heart Rate:  [] 98  Resp:  [14] 14  BP: (126-146)/(58-75) 135/61  Mean Arterial Pressure (Non-Invasive) for the past 24 hrs (Last 3 readings):   Noninvasive MAP (mmHg)   01/22/24 1855 90   01/22/24 1840 89   01/22/24 1825 89     SpO2 Percentage    01/22/24 1840 01/22/24 1855 01/22/24 2030   SpO2: 98% 96% 98%     SpO2:  [95 %-98 %] 98 %  on   ;   Device (Oxygen Therapy): room air    Body mass index is 19.46 kg/m².  Wt Readings from Last 3 Encounters:   01/22/24 58.1 kg (128 lb)   06/25/20 66.5 kg (146 lb 9.6 oz)   05/04/20 70.1 kg (154 lb 9.6 oz)     ---------------------------------------------------------------------------------------------------------------------   Physical Exam:  Physical Exam  Constitutional:       General: She is not in acute distress.     Appearance: Normal appearance.   HENT:      Head: Normocephalic and atraumatic.      Right Ear: External ear normal.      Left Ear: External ear normal.      Nose: No nasal deformity.      Mouth/Throat:      Lips: Pink.      Mouth: Mucous membranes are moist.   Eyes:      Conjunctiva/sclera: Conjunctivae normal.      Pupils: Pupils are equal, round, and  reactive to light.   Cardiovascular:      Rate and Rhythm: Normal rate and regular rhythm.      Pulses:           Dorsalis pedis pulses are 2+ on the right side and 2+ on the left side.      Heart sounds: Normal heart sounds.   Pulmonary:      Effort: Pulmonary effort is normal.      Breath sounds: Normal breath sounds. No wheezing, rhonchi or rales.   Abdominal:      General: Abdomen is flat. Bowel sounds are normal.      Palpations: Abdomen is soft.      Tenderness: There is no guarding or rebound.   Genitourinary:     Comments: No lyle catheter in place   Musculoskeletal:      Cervical back: Neck supple. Normal range of motion.      Right lower leg: No edema.      Left lower leg: No edema.   Skin:     General: Skin is warm and dry.   Neurological:      General: No focal deficit present.      Mental Status: She is alert. She is disoriented.      Comments: Rhythmic jerking of right lower extremity     Oriented to self only    Psychiatric:         Mood and Affect: Mood normal.         Behavior: Behavior normal.       ---------------------------------------------------------------------------------------------------------------------  EKG: Sinus rhythm.  Heart rate 99.  Awaiting formal cardiology read.          I have personally reviewed the EKG/Telemetry strip  ---------------------------------------------------------------------------------------------------------------------   Results from last 7 days   Lab Units 01/22/24  1534   HSTROP T ng/L <6           Results from last 7 days   Lab Units 01/22/24  1930 01/22/24  1542 01/22/24  1534   CRP mg/dL  --   --  25.25*   LACTATE mmol/L 1.8 2.2*  --    WBC 10*3/mm3  --   --  18.78*   HEMOGLOBIN g/dL  --   --  12.6   HEMATOCRIT %  --   --  38.4   MCV fL  --   --  90.1   MCHC g/dL  --   --  32.8   PLATELETS 10*3/mm3  --   --  399     Results from last 7 days   Lab Units 01/22/24  1534   SODIUM mmol/L 127*   POTASSIUM mmol/L 4.6   MAGNESIUM mg/dL 2.0   CHLORIDE mmol/L  "89*   CO2 mmol/L 23.9   BUN mg/dL 16   CREATININE mg/dL 0.95   CALCIUM mg/dL 9.7   GLUCOSE mg/dL 233*   ALBUMIN g/dL 2.7*   BILIRUBIN mg/dL 0.6   ALK PHOS U/L 118*   AST (SGOT) U/L 38*   ALT (SGPT) U/L 32   Estimated Creatinine Clearance: 41.9 mL/min (by C-G formula based on SCr of 0.95 mg/dL).  No results found for: \"AMMONIA\"    Hemoglobin A1C   Date/Time Value Ref Range Status   01/22/2024 1534 5.90 (H) 4.80 - 5.60 % Final     Glucose   Date/Time Value Ref Range Status   01/22/2024 1506 224 (H) 70 - 130 mg/dL Final     Lab Results   Component Value Date    HGBA1C 5.90 (H) 01/22/2024     Lab Results   Component Value Date    TSH 2.659 02/26/2019    FREET4 1.46 12/22/2016       No results found for: \"BLOODCX\"  No results found for: \"URINECX\"  No results found for: \"WOUNDCX\"  No results found for: \"STOOLCX\"  No results found for: \"RESPCX\"  No results found for: \"MRSACX\"  Pain Management Panel           No data to display              I have personally reviewed the above laboratory results.   ---------------------------------------------------------------------------------------------------------------------  Imaging Results (Last 7 Days)       Procedure Component Value Units Date/Time    CT Abdomen Pelvis With Contrast [[id number]] Collected: 01/22/24 1720     Updated: 01/22/24 1726    Narrative:      EXAM:    CT Abdomen and Pelvis With Intravenous Contrast     EXAM DATE:    1/22/2024 5:05 PM     CLINICAL HISTORY:    abd pain/sepsis     TECHNIQUE:    Axial computed tomography images of the abdomen and pelvis with  intravenous contrast.  Sagittal and coronal reformatted images were  created and reviewed.  This CT exam was performed using one or more of  the following dose reduction techniques:  automated exposure control,  adjustment of the mA and/or kV according to patient size, and/or use of  iterative reconstruction technique.     COMPARISON:    No relevant prior studies available.     FINDINGS:    Lung bases:  " Dependent bibasilar atelectasis. Lung bases otherwise  clear.    Heart:  Mild coronary artery calcifications.      ABDOMEN:    Liver:  Unremarkable as visualized.  No mass.    Gallbladder and bile ducts:  Cholecystectomy.  No ductal dilation.    Pancreas:  Mild fatty infiltration of the pancreas.  No ductal  dilation.    Spleen:  Unremarkable as visualized.  No splenomegaly.    Adrenals:  Unremarkable as visualized.  No mass.    Kidneys and ureters:  There are bilateral renal parapelvic cysts but  no evidence of hydronephrosis.  No renal or ureteral stones.  No  obstructive uropathy.    Stomach and bowel:  Stomach is incompletely distended.  Diffuse  colonic diverticulosis without evidence of acute diverticulitis.      PELVIS:    Appendix:  The appendix is not clearly identified.    Bladder:  Distended urinary bladder without wall thickening.    Reproductive:  Hysterectomy noted.      ABDOMEN and PELVIS:    Intraperitoneal space:  Unremarkable as visualized.  No significant  fluid collection.  No pneumoperitoneum.    Bones/joints:  Degenerative changes of the lumbar spine with mild  multilevel stenosis but no acute bony findings identified.  Arthritic  changes of the bilateral hip joints.  No dislocation.    Soft tissues:  Unremarkable as visualized.    Vasculature:  Advanced calcified atherosclerosis without evidence of  aneurysm.    Lymph nodes:  Unremarkable as visualized.  No enlarged lymph nodes.       Impression:      1.  No acute findings identified within abdomen or pelvis.  2.  Urinary bladder distention without wall thickening.  3.  Prior cholecystectomy and hysterectomy.  4.  Bilateral renal parapelvic cysts. No obstructive uropathy.  5.  Colonic diverticulosis without diverticulitis.  6.  Moderate constipation. No bowel obstruction.  7.  Bibasilar airspace disease most consistent with atelectasis.  8.  Other incidental/nonacute findings above.        This report was finalized on 1/22/2024 5:24 PM by   Fabrizio Fair MD.       CT Head Without Contrast [[id number]] Collected: 01/22/24 1718     Updated: 01/22/24 1722    Narrative:      EXAM:    CT Head Without Intravenous Contrast     EXAM DATE:    1/22/2024 5:05 PM     CLINICAL HISTORY:    headache     TECHNIQUE:    Axial computed tomography images of the head/brain without intravenous  contrast.  Sagittal and coronal reformatted images were created and  reviewed.  This CT exam was performed using one or more of the following  dose reduction techniques:  automated exposure control, adjustment of  the mA and/or kV according to patient size, and/or use of iterative  reconstruction technique.     COMPARISON:    No relevant prior studies available.     FINDINGS:    Brain and extra-axial spaces:  Unremarkable as visualized.  No  hemorrhage.  No significant white matter disease.  No edema.  No  ventriculomegaly. Mild age-related atrophy noted.    Bones/joints:  Unremarkable as visualized.  No acute fracture.    Soft tissues:  Unremarkable as visualized.    Sinuses:  Unremarkable as visualized.  No acute sinusitis.    Mastoid air cells:  Unremarkable as visualized.  No mastoid effusion.       Impression:        Unremarkable exam demonstrating no CT evidence of acute intracranial  findings.        This report was finalized on 1/22/2024 5:20 PM by Dr. Fabrizio Fair MD.       XR Chest 1 View [[id number]] Collected: 01/22/24 1615     Updated: 01/22/24 1617    Narrative:      PROCEDURE: XR CHEST 1 VW-       HISTORY: Cough, vomiting     COMPARISON: None.     FINDINGS: The heart is normal in size. The mediastinum is unremarkable.  There are low lung volumes with a left basilar opacity either  atelectasis or pneumonia. There are no effusions. There is no  pneumothorax. There are no acute osseous abnormalities.       Impression:      Low lung volumes with left basilar atelectasis versus  pneumonia.        This report was finalized on 1/22/2024 4:15 PM by David Ch M.D..              I have personally reviewed the above radiology results.     Last Echocardiogram:    ---------------------------------------------------------------------------------------------------------------------    Assessment & Plan      Active Hospital Problems    Diagnosis  POA    **Vomiting [R11.10]  Yes     Nausea with vomiting, POA  Generalized weakness, POA  SIRS versus severe sepsis, POA   Dementia  Generalized weakness over the past several weeks.  Nausea with vomiting over the past 24 hours.  Continue as needed Zofran for nausea management.  Patient does meet SIRS criteria at this time without definite source of infection for sepsis. Continue to monitor vital signs. HR>90 WBC >12  There is a concern for possible aspiration pneumonia given vomiting and apparent witnessed aspiration per son, as well as elevated inflammatory markers on initial lab work.  Respiratory PCR negative   SLP consult to evaluate risk of aspiration.  Antibiotic coverage with vancomycin, Azactam and Flagyl to cover for suspected aspiration pneumonia/pneumonitis.  Blood cultures ordered at ER.  PT/OT consults due to generalized weakness.  Tremor versus seizure-like activity, POA  Patient with intermittent rhythmic moving of right lower extremity.  Cerebell ordered while patient in ED, current seizure burden 0%, will continue to monitor on Cerebell for ~1hr before moving to the floor.   Hyponatremia, POA  Corrected sodium of 130.  Small send NS given in ED.  Continue IV NS 30 mill per hour.  Repeat CMP in the a.m.  Hyperglycemia without diagnosis of type II diabetes mellitus,  POA  History of prediabetes  Glucose 233 in the ED.  Hemoglobin A1c 5.9.  Family notes history of prediabetes but no history of type II DM.  Accu-Cheks ordered to trend glucose, hypoglycemia protocol in place.  CHRONIC MEDICAL PROBLEMS  Anxiety  Hyperlipidemia  Trigeminal neuralgia  Hypothyroidism  Restart home meds pending formal med rec.  TSH ordered.  Supportive  care    F/E/N: IV NS at 100 mill per hour.  Continue monitor electrolytes.  NPO.    ---------------------------------------------------  DVT Prophylaxis: subcu lovenox  Activity: up with assistance     The patient is considered to be a high risk patient due to: generalized weakness, SIRS     INPATIENT status due to the need for care which can only be reasonably provided in an hospital setting such as aggressive/expedited ancillary services and/or consultation services, the necessity for IV medications, close physician monitoring and/or the possible need for procedures.  In such, I feel patient’s risk for adverse outcomes and need for care warrant INPATIENT evaluation and predict the patient’s care encounter to likely last beyond 2 midnights.      Code Status: DNR/DNI. Discussed with  and son at bedside.     Disposition/Discharge planning: Pending clinical course, may need placement, CM/SS consulted     I have discussed the patient's assessment and plan with Dr. Baylee Castro PA-C  Hospitalist Service -- [facility]       01/22/24  20:48 EST    Attending Physician: Dr. Beach       Electronically signed by Kinjal Beach DO at 01/22/24 0720       Current Facility-Administered Medications   Medication Dose Route Frequency Provider Last Rate Last Admin    acetaminophen (TYLENOL) tablet 650 mg  650 mg Oral Q6H PRN Kinjal Beach DO   650 mg at 01/29/24 2253    Or    acetaminophen (TYLENOL) suppository 650 mg  650 mg Rectal Q4H PRN Kinjal Beach DO        atorvastatin (LIPITOR) tablet 20 mg  20 mg Oral Daily Kinjal Beach DO   20 mg at 01/30/24 0852    sennosides-docusate (PERICOLACE) 8.6-50 MG per tablet 2 tablet  2 tablet Oral BID Kinjal Beach DO   2 tablet at 01/30/24 0852    And    polyethylene glycol (MIRALAX) packet 17 g  17 g Oral Daily PRN Kinjal Beach DO        And    bisacodyl (DULCOLAX) EC tablet 5 mg  5 mg Oral Daily PRN Baylee  Kinjal Grissom DO        And    bisacodyl (DULCOLAX) suppository 10 mg  10 mg Rectal Daily PRN Kinjal Beach DO        buPROPion XL (WELLBUTRIN XL) 24 hr tablet 150 mg  150 mg Oral Daily Kinjal Beach DO   150 mg at 01/30/24 0852    cefTRIAXone (ROCEPHIN) 2,000 mg in sodium chloride 0.9 % 100 mL IVPB-VTB  2,000 mg Intravenous Q24H Shandra Burroughs APRN 200 mL/hr at 01/29/24 1318 2,000 mg at 01/29/24 1318    dextrose (D50W) (25 g/50 mL) IV injection 25 g  25 g Intravenous Q15 Min PRN Kinjal Beach DO        dextrose (GLUTOSE) oral gel 15 g  15 g Oral Q15 Min PRN Kinjal Beach DO        diazePAM (VALIUM) injection 5 mg  5 mg Intramuscular Q4H PRN Narinder Maravilla APRN   5 mg at 01/29/24 0051    gabapentin (NEURONTIN) capsule 400 mg  400 mg Oral Nightly Kinjal Beach DO   400 mg at 01/29/24 2024    glucagon HCl (Diagnostic) injection 1 mg  1 mg Intramuscular Q15 Min PRN Kinjal Beach DO        heparin (porcine) 5000 UNIT/ML injection 5,000 Units  5,000 Units Subcutaneous Q12H Kinjal Beach DO   5,000 Units at 01/30/24 0851    Insulin Lispro (humaLOG) injection 2-7 Units  2-7 Units Subcutaneous 4x Daily AC & at Bedtime Kinjal Beach DO   2 Units at 01/29/24 2031    LORazepam (ATIVAN) tablet 2 mg  2 mg Oral Nightly Kinjal Beach DO   2 mg at 01/29/24 2024    meclizine (ANTIVERT) tablet 25 mg  25 mg Oral TID PRN Kinjal Beach DO        megestrol (MEGACE) 40 MG/ML suspension 200 mg  200 mg Oral TID Kinjal Beach DO   200 mg at 01/30/24 0852    melatonin tablet 5 mg  5 mg Oral Nightly PRN Narinder Maravilla, APRN   5 mg at 01/29/24 2024    nitroglycerin (NITROSTAT) SL tablet 0.4 mg  0.4 mg Sublingual Q5 Min PRN Kinjal Beach DO        ondansetron (ZOFRAN) injection 4 mg  4 mg Intravenous Q6H PRN Kinjal Beach DO   4 mg at 01/26/24 2059    ondansetron (ZOFRAN) tablet 8 mg  8 mg Oral Q8H PRN Kinjal Beach DO   8 mg at 01/27/24  1743    pantoprazole (PROTONIX) EC tablet 40 mg  40 mg Oral Q AM Kinjal Beach, DO   40 mg at 24 0530    sodium chloride 0.9 % flush 10 mL  10 mL Intravenous PRN Baylee, Kinjal Jaciel, DO        sodium chloride 0.9 % flush 10 mL  10 mL Intravenous Q12H Baylee, Kinjal Grissom, DO   10 mL at 24 0852    sodium chloride 0.9 % flush 10 mL  10 mL Intravenous PRN Baylee, Kinjal Jaciel, DO        sodium chloride 0.9 % infusion 40 mL  40 mL Intravenous PRN Baylee, Kinjal Jaciel, DO            Physician Progress Notes (most recent note)        Jyothi iKng MD at 24 1138                     PROGRESS NOTE         Patient Identification:  Name:  Leonie Laws  Age:  83 y.o.  Sex:  female  :  1940  MRN:  [id number]  Visit Number:  [id number]  Primary Care Provider:  Joe Bowden MD         LOS: 7 days       ----------------------------------------------------------------------------------------------------------------------  Subjective       Chief Complaints:    Vomiting and Weakness - Generalized        Interval History:      The patient is awake and alert, eating breakfast.  On room air with no apparent distress.  Family at the bedside.  Afebrile.  Denies diarrhea.  Lung exam is clear to auscultation bilaterally.  Abdomen is soft and nontender with normoactive bowel sounds.  WBC is normal at 10.59.  Blood cultures from 2024 preliminarily report no growth at 4 days.  The patient reports she is anxious to return home.    Review of Systems:    Constitutional: no fever, chills and night sweats.  Generalized fatigue.  Eyes: no eye drainage, itching or redness.  HEENT: no mouth sores, dysphagia or nose bleed.  Respiratory: no for shortness of breath, cough or production of sputum.  Cardiovascular: no chest pain, no palpitations, no orthopnea.  Gastrointestinal: no nausea, vomiting or diarrhea. No abdominal pain, hematemesis or rectal bleeding.  Genitourinary: no dysuria or  polyuria.  Hematologic/lymphatic: no lymph node abnormalities, no easy bruising or easy bleeding.  Musculoskeletal: no muscle or joint pain.  Skin: No rash and no itching.  Neurological: no loss of consciousness, no seizure, no headache.  Behavioral/Psych: no depression or suicidal ideation.  Endocrine: no hot flashes.  Immunologic: negative.    ----------------------------------------------------------------------------------------------------------------------      Objective       [facility] Meds:  atorvastatin, 20 mg, Oral, Daily  buPROPion XL, 150 mg, Oral, Daily  cefTRIAXone, 2,000 mg, Intravenous, Q24H  gabapentin, 400 mg, Oral, Nightly  heparin (porcine), 5,000 Units, Subcutaneous, Q12H  insulin lispro, 2-7 Units, Subcutaneous, 4x Daily AC & at Bedtime  LORazepam, 2 mg, Oral, Nightly  megestrol, 200 mg, Oral, TID  pantoprazole, 40 mg, Oral, Q AM  senna-docusate sodium, 2 tablet, Oral, BID  sodium chloride, 10 mL, Intravenous, Q12H           ----------------------------------------------------------------------------------------------------------------------    Vital Signs:  Temp:  [98.2 °F (36.8 °C)-98.7 °F (37.1 °C)] 98.3 °F (36.8 °C)  Heart Rate:  [89-95] 95  Resp:  [18] 18  BP: (112-154)/(61-87) 112/61  Mean Arterial Pressure (Non-Invasive) for the past 24 hrs (Last 3 readings):   Noninvasive MAP (mmHg)   01/29/24 1022 80   01/29/24 0636 97   01/28/24 1421 86     SpO2 Percentage    01/28/24 1421 01/29/24 0636 01/29/24 1022   SpO2: 97% 96% 95%     SpO2:  [95 %-97 %] 95 %  on   ;   Device (Oxygen Therapy): room air    Body mass index is 18.98 kg/m².  Wt Readings from Last 3 Encounters:   01/22/24 56.6 kg (124 lb 12.8 oz)   06/25/20 66.5 kg (146 lb 9.6 oz)   05/04/20 70.1 kg (154 lb 9.6 oz)        Intake/Output Summary (Last 24 hours) at 1/29/2024 1138  Last data filed at 1/29/2024 0833  Gross per 24 hour   Intake 800 ml   Output 600 ml   Net 200 ml     Diet: Regular/House Diet; Feeding Assistance -  Nursing; Texture: Soft to Chew (NDD 3); Soft to Chew: Chopped Meat; Fluid Consistency: Thin (IDDSI 0)  ----------------------------------------------------------------------------------------------------------------------      Physical Exam:    Constitutional: Elderly and chronically ill-appearing.  Awake and alert, eating breakfast.  Sitting up comfortably in bed on room air with no apparent distress.  Family at the bedside.  HENT:  Head: Normocephalic and atraumatic.  Mouth:  Moist mucous membranes.    Eyes:  Conjunctivae and EOM are normal.  No scleral icterus.  Neck:  Neck supple.  No JVD present.    Cardiovascular:  Normal rate, regular rhythm and normal heart sounds with no murmur. No edema.  Pulmonary/Chest:  No respiratory distress, no wheezes, no crackles, with normal breath sounds and good air movement.  On room air.    Abdominal:  Soft.  Bowel sounds are normal.  No distension and no tenderness.   Musculoskeletal:  No edema, no tenderness, and no deformity.  No swelling or redness of joints.  Neurological:  Alert and oriented to person and place, slightly confused, sleepy  Skin:  Skin is warm and dry.  No rash noted.  No pallor.           ----------------------------------------------------------------------------------------------------------------------  Results from last 7 days   Lab Units 01/22/24  1534   HSTROP T ng/L <6           Results from last 7 days   Lab Units 01/29/24  0146 01/28/24  0304 01/27/24  0204 01/23/24  0509 01/22/24  1930 01/22/24  1542 01/22/24  1534   CRP mg/dL  --   --   --   --   --   --  25.25*   LACTATE mmol/L  --   --   --   --  1.8 2.2*  --    WBC 10*3/mm3 10.59 10.51 12.30*   < >  --   --  18.78*   HEMOGLOBIN g/dL 9.6* 8.7* 8.9*   < >  --   --  12.6   HEMATOCRIT % 28.4* 26.0* 26.9*   < >  --   --  38.4   MCV fL 87.4 88.7 88.2   < >  --   --  90.1   MCHC g/dL 33.8 33.5 33.1   < >  --   --  32.8   PLATELETS 10*3/mm3 504* 418 381   < >  --   --  399    < > = values in this  "interval not displayed.     Results from last 7 days   Lab Units 01/29/24  0146 01/28/24  0304 01/27/24  0204 01/24/24  0449 01/23/24  0509 01/22/24  1534   SODIUM mmol/L 138 138 135*   < > 130* 127*   POTASSIUM mmol/L 3.8 3.4* 3.0*   < > 3.9 4.6   MAGNESIUM mg/dL  --   --  1.9  --   --  2.0  2.0   CHLORIDE mmol/L 105 107 104   < > 97* 89*   CO2 mmol/L 24.1 23.4 24.3   < > 25.7 23.9   BUN mg/dL 5* 5* 6*   < > 17 16   CREATININE mg/dL 0.64 0.62 0.62   < > 0.85 0.95   CALCIUM mg/dL 8.6 8.1* 8.1*   < > 8.7 9.7   GLUCOSE mg/dL 104* 135* 122*   < > 138* 233*   ALBUMIN g/dL  --   --   --   --  2.3* 2.7*   BILIRUBIN mg/dL  --   --   --   --  0.3 0.6   ALK PHOS U/L  --   --   --   --  140* 118*   AST (SGOT) U/L  --   --   --   --  36* 38*   ALT (SGPT) U/L  --   --   --   --  28 32    < > = values in this interval not displayed.   Estimated Creatinine Clearance: 60.6 mL/min (by C-G formula based on SCr of 0.64 mg/dL).  No results found for: \"AMMONIA\"    Glucose   Date/Time Value Ref Range Status   01/29/2024 1043 104 70 - 130 mg/dL Final   01/29/2024 0639 84 70 - 130 mg/dL Final   01/28/2024 2034 123 70 - 130 mg/dL Final   01/28/2024 1621 120 70 - 130 mg/dL Final   01/28/2024 1057 148 (H) 70 - 130 mg/dL Final   01/28/2024 0700 110 70 - 130 mg/dL Final   01/27/2024 2011 172 (H) 70 - 130 mg/dL Final   01/27/2024 1609 94 70 - 130 mg/dL Final     Lab Results   Component Value Date    HGBA1C 5.90 (H) 01/22/2024     Lab Results   Component Value Date    TSH 0.897 01/22/2024    FREET4 1.46 12/22/2016       Blood Culture   Date Value Ref Range Status   01/22/2024 Streptococcus pyogenes (Group A) (C)  Preliminary   01/22/2024 Streptococcus pyogenes (Group A) (C)  Final     No results found for: \"URINECX\"  No results found for: \"WOUNDCX\"  No results found for: \"STOOLCX\"  No results found for: \"RESPCX\"  Pain Management Panel           No data to display          "         ----------------------------------------------------------------------------------------------------------------------  Imaging Results (Last 24 Hours)       ** No results found for the last 24 hours. **            ----------------------------------------------------------------------------------------------------------------------    Pertinent Infectious Disease Results      Assessment/Plan       Assessment     Severe sepsis with lactic acid greater than 2 on admission  Streptococcus pyogenes bacteremia        Plan      I saw and examined the patient myself this morning with LEONARDO Davis and discussed the plan of care with her and primary RN and here are my findings:    The patient is currently awake and alert, actively engaged in eating breakfast, with family members present at the bedside. There is no apparent distress, and the patient is afebrile while denying any issues with diarrhea. The lung examination reveals clear auscultation bilaterally, and the abdomen is soft, nontender, with normoactive bowel sounds. The white blood cell count is within the normal range at 10.59. Preliminary results from blood cultures conducted on 1/25/2024 show no growth at 4 days. The patient expresses eagerness to return home.    It is recommended to maintain the current regimen of ceftriaxone at 2 g IV every 24 hours for Streptococcus pyogenes bacteremia. The patient will require a 14-day antibiotic therapy duration starting from the first negative blood cultures, tentatively concluding on 2/8/2024. Considering the significant clinical improvement, there is a mutual agreement to transition to an oral Omnicef course at the time of discharge, ensuring the completion of the full 14-day course for bacteremia. Follow-up in the outpatient infectious disease clinic is planned to repeat blood cultures x 2 at that time.      ANTIMICROBIAL THERAPY    cefTRIAXone (ROCEPHIN) 2000 mg IVPB in 100 mL NS (VTB)     Code Status:    Code Status and Medical Interventions:   Ordered at: 24     Medical Intervention Limits:    NO intubation (DNI)     Code Status (Patient has no pulse and is not breathing):    No CPR (Do Not Attempt to Resuscitate)     Medical Interventions (Patient has pulse or is breathing):    Limited Support       LEONARDO Strong  24  11:38 EST    Electronically signed by LEONARDO Strong, 24, 11:41 AM EST.    Electronically signed by Jyothi King MD, 24, 1:00 PM EST.      Electronically signed by Jyothi King MD at 24 1302          Consult Notes (most recent note)        Jyothi King MD at 24 0736        Consult Orders    1. Inpatient Infectious Diseases Consult [[id number]] ordered by Eulalio Clarke DO at 24 1514                         INFECTIOUS DISEASE CONSULTATION REPORT        Patient Identification:  Name:  Leonie Laws  Age:  83 y.o.  Sex:  female  :  1940  MRN:  [id number]   Visit Number:  [id number]  Primary Care Physician:  Joe Bowden MD        Referring Provider:  Dr. Clarke    Reason for consult: Streptococcus pyogenes bacteremia       LOS: 2 days        Subjective       Subjective    History of present illness:      Thank you Dr. Clarke for allowing us to participate in the care of your patient.  As you well know, Ms. Leonie Laws is a 83 y.o. female with past medical history significant for anxiety, hyperlipidemia, trigeminal neuralgia, hypothyroidism, who presented to [facility] Emergency Department on 2024 for generalized weakness.  WBC improving at 13.24.  Respiratory panel PCR negative.  Lactic acid elevated at 2.2 on admission.  CRP elevated 25.25 on admission.  Blood cultures from 2024 2 out of 2 sets positive for Streptococcus pyogenes group A.  Chest x-ray from 2024 reports low lung volumes with left basilar atelectasis versus pneumonia.  CT of the head from  1/22/2024 unremarkable with no acute intracranial findings.  CT of the abdomen pelvis from 1/22/2024 reports no acute findings in the abdomen or pelvis, urinary bladder distention without wall thickening, colonic diverticulosis without diverticulitis, bibasilar airspace disease most consistent with atelectasis, moderate constipation, no bowel obstruction.          Infectious Disease consultation was requested for antimicrobial management.      ---------------------------------------------------------------------------------------------------------------------     Review Of Systems:    Unable to obtain due to dementia.    ---------------------------------------------------------------------------------------------------------------------     Past Medical History    Past Medical History:   Diagnosis Date    Anxiety     Hyperlipidemia     Trigeminal neuralgia        Past Surgical History    Past Surgical History:   Procedure Laterality Date    BREAST SURGERY      HYSTERECTOMY         Family History    Family History   Problem Relation Age of Onset    Stroke Mother     Heart disease Father        Social History    Social History     Tobacco Use    Smoking status: Never    Smokeless tobacco: Never   Vaping Use    Vaping Use: Never used   Substance Use Topics    Alcohol use: No    Drug use: No       Allergies    Doxycycline, Gentamycin [gentamicin], Miconazole, Penicillins, and Sulfa antibiotics  ---------------------------------------------------------------------------------------------------------------------     Home Medications:    Prior to Admission Medications       Prescriptions Last Dose Informant Patient Reported? Taking?    atorvastatin (LIPITOR) 20 MG tablet Unknown Pharmacy Yes No    Take 1 tablet by mouth Daily.    buPROPion XL (WELLBUTRIN XL) 150 MG 24 hr tablet Unknown  Yes No    Take 1 tablet by mouth 3 times a day.    gabapentin (NEURONTIN) 400 MG capsule Unknown Pharmacy Yes No    Take 1 capsule by  mouth Every Night.    LORazepam (ATIVAN) 2 MG tablet Unknown Pharmacy Yes No    Take 1 tablet by mouth Every Night.    meclizine (ANTIVERT) 25 MG tablet Unknown  Yes No    Take 1 tablet by mouth 3 (Three) Times a Day As Needed for Dizziness.    megestrol (MEGACE) 40 MG/ML suspension  Pharmacy Yes No    Take 5 mL by mouth 3 times a day.    omeprazole (priLOSEC) 20 MG capsule Unknown  Yes No    Take 1 capsule by mouth Daily.    ondansetron (ZOFRAN) 8 MG tablet Unknown  Yes No    Take 1 tablet by mouth Every 8 (Eight) Hours As Needed for Nausea or Vomiting.          ---------------------------------------------------------------------------------------------------------------------    Objective       Objective    Hospital Scheduled Meds:  atorvastatin, 20 mg, Oral, Daily  buPROPion XL, 150 mg, Oral, Daily  gabapentin, 400 mg, Oral, Nightly  heparin (porcine), 5,000 Units, Subcutaneous, Q12H  insulin lispro, 2-7 Units, Subcutaneous, 4x Daily AC & at Bedtime  LORazepam, 2 mg, Oral, Nightly  megestrol, 200 mg, Oral, TID  pantoprazole, 40 mg, Oral, Q AM  piperacillin-tazobactam, 3.375 g, Intravenous, Q8H  senna-docusate sodium, 2 tablet, Oral, BID  sodium chloride, 10 mL, Intravenous, Q12H  vancomycin, 1,000 mg, Intravenous, Q24H      sodium chloride, 100 mL/hr, Last Rate: 100 mL/hr (01/23/24 2333)      ---------------------------------------------------------------------------------------------------------------------   Vital Signs:  Temp:  [98.6 °F (37 °C)-99.5 °F (37.5 °C)] 99.1 °F (37.3 °C)  Heart Rate:  [76-88] 78  Resp:  [16] 16  BP: ()/(54-74) 105/54  Mean Arterial Pressure (Non-Invasive) for the past 24 hrs (Last 3 readings):   Noninvasive MAP (mmHg)   01/23/24 1400 78   01/23/24 1000 75     SpO2 Percentage    01/22/24 2253 01/23/24 0600 01/23/24 1000   SpO2: 98% 95% 96%     SpO2:  [96 %] 96 %  on   ;   Device (Oxygen Therapy): room air    Body mass index is 18.98 kg/m².  Wt Readings from Last 3 Encounters:    01/22/24 56.6 kg (124 lb 12.8 oz)   06/25/20 66.5 kg (146 lb 9.6 oz)   05/04/20 70.1 kg (154 lb 9.6 oz)     ---------------------------------------------------------------------------------------------------------------------     Physical Exam:    Constitutional: Elderly, chronically ill-appearing.  Currently on room air with no apparent distress.  HENT:  Head: Normocephalic and atraumatic.  Mouth:  Moist mucous membranes.    Eyes:  Conjunctivae and EOM are normal.  No scleral icterus.  Neck:  Neck supple.  No JVD present.    Cardiovascular:  Normal rate, regular rhythm and normal heart sounds with no murmur. No edema.  Pulmonary/Chest:  No respiratory distress, no wheezes, no crackles, with normal breath sounds and good air movement.  Abdominal:  Soft.  Bowel sounds are normal.  No distension and no tenderness.  Chase catheter in place.  Musculoskeletal:  No edema, no tenderness, and no deformity.  No swelling or redness of joints.  Neurological: Confused secondary to dementia.  Skin:  Skin is warm and dry.  No rash noted.  No pallor.   Psychiatric:  Normal mood and affect.  Behavior is normal.    ---------------------------------------------------------------------------------------------------------------------    Results from last 7 days   Lab Units 01/22/24  1534   HSTROP T ng/L <6           Results from last 7 days   Lab Units 01/24/24 0449 01/23/24  0509 01/22/24  1930 01/22/24  1542 01/22/24  1534   CRP mg/dL  --   --   --   --  25.25*   LACTATE mmol/L  --   --  1.8 2.2*  --    WBC 10*3/mm3 13.24* 16.16*  --   --  18.78*   HEMOGLOBIN g/dL 9.3* 9.3*  --   --  12.6   HEMATOCRIT % 27.9* 27.9*  --   --  38.4   MCV fL 88.3 87.7  --   --  90.1   MCHC g/dL 33.3 33.3  --   --  32.8   PLATELETS 10*3/mm3 298 340  --   --  399     Results from last 7 days   Lab Units 01/24/24 0449 01/23/24  0509 01/22/24  1534   SODIUM mmol/L 133* 130* 127*   POTASSIUM mmol/L 3.4* 3.9 4.6   MAGNESIUM mg/dL  --   --  2.0  2.0  "  CHLORIDE mmol/L 103 97* 89*   CO2 mmol/L 25.5 25.7 23.9   BUN mg/dL 15 17 16   CREATININE mg/dL 0.86 0.85 0.95   CALCIUM mg/dL 8.2* 8.7 9.7   GLUCOSE mg/dL 91 138* 233*   ALBUMIN g/dL  --  2.3* 2.7*   BILIRUBIN mg/dL  --  0.3 0.6   ALK PHOS U/L  --  140* 118*   AST (SGOT) U/L  --  36* 38*   ALT (SGPT) U/L  --  28 32   Estimated Creatinine Clearance: 45.1 mL/min (by C-G formula based on SCr of 0.86 mg/dL).  No results found for: \"AMMONIA\"    Hemoglobin A1C   Date/Time Value Ref Range Status   01/22/2024 1534 5.90 (H) 4.80 - 5.60 % Final     Glucose   Date/Time Value Ref Range Status   01/24/2024 0615 80 70 - 130 mg/dL Final   01/23/2024 2203 114 70 - 130 mg/dL Final   01/23/2024 1654 98 70 - 130 mg/dL Final   01/23/2024 1053 98 70 - 130 mg/dL Final   01/23/2024 0718 118 70 - 130 mg/dL Final   01/22/2024 1506 224 (H) 70 - 130 mg/dL Final     Lab Results   Component Value Date    HGBA1C 5.90 (H) 01/22/2024     Lab Results   Component Value Date    TSH 0.897 01/22/2024    FREET4 1.46 12/22/2016       Blood Culture   Date Value Ref Range Status   01/22/2024 Gram Positive Cocci (C)  Preliminary   01/22/2024 Gram Positive Cocci (C)  Preliminary     No results found for: \"URINECX\"  No results found for: \"WOUNDCX\"  No results found for: \"STOOLCX\"  No results found for: \"RESPCX\"  Pain Management Panel           No data to display              I have personally reviewed the above laboratory results.   ---------------------------------------------------------------------------------------------------------------------  Imaging Results (Last 7 Days)       Procedure Component Value Units Date/Time    CT Abdomen Pelvis With Contrast [[id number]] Collected: 01/22/24 1720     Updated: 01/22/24 1726    Narrative:      EXAM:    CT Abdomen and Pelvis With Intravenous Contrast     EXAM DATE:    1/22/2024 5:05 PM     CLINICAL HISTORY:    abd pain/sepsis     TECHNIQUE:    Axial computed tomography images of the abdomen and pelvis " with  intravenous contrast.  Sagittal and coronal reformatted images were  created and reviewed.  This CT exam was performed using one or more of  the following dose reduction techniques:  automated exposure control,  adjustment of the mA and/or kV according to patient size, and/or use of  iterative reconstruction technique.     COMPARISON:    No relevant prior studies available.     FINDINGS:    Lung bases:  Dependent bibasilar atelectasis. Lung bases otherwise  clear.    Heart:  Mild coronary artery calcifications.      ABDOMEN:    Liver:  Unremarkable as visualized.  No mass.    Gallbladder and bile ducts:  Cholecystectomy.  No ductal dilation.    Pancreas:  Mild fatty infiltration of the pancreas.  No ductal  dilation.    Spleen:  Unremarkable as visualized.  No splenomegaly.    Adrenals:  Unremarkable as visualized.  No mass.    Kidneys and ureters:  There are bilateral renal parapelvic cysts but  no evidence of hydronephrosis.  No renal or ureteral stones.  No  obstructive uropathy.    Stomach and bowel:  Stomach is incompletely distended.  Diffuse  colonic diverticulosis without evidence of acute diverticulitis.      PELVIS:    Appendix:  The appendix is not clearly identified.    Bladder:  Distended urinary bladder without wall thickening.    Reproductive:  Hysterectomy noted.      ABDOMEN and PELVIS:    Intraperitoneal space:  Unremarkable as visualized.  No significant  fluid collection.  No pneumoperitoneum.    Bones/joints:  Degenerative changes of the lumbar spine with mild  multilevel stenosis but no acute bony findings identified.  Arthritic  changes of the bilateral hip joints.  No dislocation.    Soft tissues:  Unremarkable as visualized.    Vasculature:  Advanced calcified atherosclerosis without evidence of  aneurysm.    Lymph nodes:  Unremarkable as visualized.  No enlarged lymph nodes.       Impression:      1.  No acute findings identified within abdomen or pelvis.  2.  Urinary bladder  distention without wall thickening.  3.  Prior cholecystectomy and hysterectomy.  4.  Bilateral renal parapelvic cysts. No obstructive uropathy.  5.  Colonic diverticulosis without diverticulitis.  6.  Moderate constipation. No bowel obstruction.  7.  Bibasilar airspace disease most consistent with atelectasis.  8.  Other incidental/nonacute findings above.        This report was finalized on 1/22/2024 5:24 PM by Dr. Fabrizio Fair MD.       CT Head Without Contrast [[id number]] Collected: 01/22/24 1718     Updated: 01/22/24 1722    Narrative:      EXAM:    CT Head Without Intravenous Contrast     EXAM DATE:    1/22/2024 5:05 PM     CLINICAL HISTORY:    headache     TECHNIQUE:    Axial computed tomography images of the head/brain without intravenous  contrast.  Sagittal and coronal reformatted images were created and  reviewed.  This CT exam was performed using one or more of the following  dose reduction techniques:  automated exposure control, adjustment of  the mA and/or kV according to patient size, and/or use of iterative  reconstruction technique.     COMPARISON:    No relevant prior studies available.     FINDINGS:    Brain and extra-axial spaces:  Unremarkable as visualized.  No  hemorrhage.  No significant white matter disease.  No edema.  No  ventriculomegaly. Mild age-related atrophy noted.    Bones/joints:  Unremarkable as visualized.  No acute fracture.    Soft tissues:  Unremarkable as visualized.    Sinuses:  Unremarkable as visualized.  No acute sinusitis.    Mastoid air cells:  Unremarkable as visualized.  No mastoid effusion.       Impression:        Unremarkable exam demonstrating no CT evidence of acute intracranial  findings.        This report was finalized on 1/22/2024 5:20 PM by Dr. Fabrizio Fair MD.       XR Chest 1 View [[id number]] Collected: 01/22/24 1615     Updated: 01/22/24 1617    Narrative:      PROCEDURE: XR CHEST 1 VW-       HISTORY: Cough, vomiting     COMPARISON: None.      FINDINGS: The heart is normal in size. The mediastinum is unremarkable.  There are low lung volumes with a left basilar opacity either  atelectasis or pneumonia. There are no effusions. There is no  pneumothorax. There are no acute osseous abnormalities.       Impression:      Low lung volumes with left basilar atelectasis versus  pneumonia.        This report was finalized on 1/22/2024 4:15 PM by David Ch M.D..             I have personally reviewed the above radiology results.   ---------------------------------------------------------------------------------------------------------------------      Pertinent Infectious Disease Results      Assessment & Plan      Assessment     Severe sepsis with lactic acid greater than 2 on admission  Streptococcus pyogenes bacteremia      Plan     I saw and examined the patient myself this morning with LEONARDO Guerra and discussed the findings and plan of care with her and got report from primary RN and here are my findings:    On 1/22/2024, the patient sought care at [facility] due to generalized weakness. Initial assessments revealed a WBC count of 13.24, showing improvement. The respiratory panel PCR test returned negative results. Upon admission, the patient exhibited elevated lactic acid at 2.2 and a CRP level of 25.25.    Blood cultures conducted on 1/22/2024 yielded positive results for Streptococcus pyogenes group A, with both sets showing the presence of the bacteria. A chest x-ray from the same date indicated low lung volumes, with findings suggestive of left basilar atelectasis or possible pneumonia.    A CT scan of the head performed on 1/22/2024 showed unremarkable results, with no acute intracranial findings. Additionally, a CT scan of the abdomen and pelvis conducted on the same date reported no acute abnormalities in the abdomen or pelvis. The findings included urinary bladder distention without wall thickening,  colonic diverticulosis without diverticulitis, bibasilar airspace disease consistent with atelectasis, moderate constipation, and no signs of bowel obstruction.    Clinically patient does not show significant clinical evidence suggestive of pneumonia but she definitely is at risk for aspiration.  For now we will transition her antibiotic coverage to high-dose ceftriaxone to cover for group A Streptococcus pyogenes bacteremia since IV penicillin is going to be more complicated from an infusion therapy standpoint.  I agree with Dr. Clarke that there will be no indication for clindamycin at this time nor high-dose penicillin.    ANTIMICROBIAL THERAPY    piperacillin-tazobactam (ZOSYN) IVPB 3.375 g in 100 mL NS VTB  vancomycin (VANCOCIN) 1000mg in NS 250ml (CD)       Again, thank you Dr. Clarke for allowing us to participate in the care of your patient and please feel free to call for any questions you may have.        Code Status:     Code Status and Medical Interventions:   Ordered at: 24     Medical Intervention Limits:    NO intubation (DNI)     Code Status (Patient has no pulse and is not breathing):    No CPR (Do Not Attempt to Resuscitate)     Medical Interventions (Patient has pulse or is breathing):    Limited Support         LEONARDO Guerra  24  07:39 EST    Electronically signed by LEONARDO Guerra, 24, 7:47 AM EST.    Electronically signed by Jyothi King MD, 24, 8:00 AM EST.      Electronically signed by Jyothi King MD at 24 0808          Physical Therapy Notes (most recent note)        Richelle Saravia, PT at 24 1508  Version 1 of 1         Acute Care - Physical Therapy Treatment Note   [facility]     Patient Name: Leonie Laws  : 1940  MRN: [id number]  Today's Date: 2024   Onset of Illness/Injury or Date of Surgery: 24 (admission date)  Visit Dx:     ICD-10-CM ICD-9-CM   1. Nausea and vomiting, unspecified vomiting type   R11.2 787.01   2. Dehydration  E86.0 276.51   3. Debility  R53.81 799.3     Patient Active Problem List   Diagnosis    Cystocele    Diverticulosis of large intestine    Hemorrhoids    Hyperlipidemia    Postoperative hypothyroidism    Impaired fasting glucose    Osteoporosis    Rectocele    Trigeminal neuralgia    Anxiety    Vomiting     Past Medical History:   Diagnosis Date    Anxiety     Hyperlipidemia     Trigeminal neuralgia      Past Surgical History:   Procedure Laterality Date    BREAST SURGERY      HYSTERECTOMY       PT Assessment (last 12 hours)       PT Evaluation and Treatment       Row Name 01/29/24 1400          Physical Therapy Time and Intention    Subjective Information complains of;nausea/vomiting;weakness  -     Document Type therapy note (daily note)  -     Mode of Treatment physical therapy  -     Patient Effort adequate  -       Row Name 01/29/24 1400          General Information    Patient Profile Reviewed yes  -     Patient/Family/Caregiver Comments/Observations Family agreeable for therapy to attempt treatment  -     Existing Precautions/Restrictions fall  -     Limitations/Impairments safety/cognitive  -       Row Name 01/29/24 1400          Cognition    Affect/Mental Status (Cognition) confused  -     Orientation Status (Cognition) oriented to;person  -     Follows Commands (Cognition) follows one-step commands  -     Personal Safety Interventions fall prevention program maintained;muscle strengthening facilitated;nonskid shoes/slippers when out of bed;supervised activity  -       Row Name 01/29/24 1400          Bed Mobility    Supine-Sit [place] (Bed Mobility) dependent (less than 25% patient effort);2 person assist;maximum assist (25% patient effort)  -     Bed Mobility, Safety Issues decreased use of arms for pushing/pulling;decreased use of legs for bridging/pushing;impaired trunk control for bed mobility;cognitive deficits limit understanding  -      Assistive Device (Bed Mobility) head of bed elevated  -       Row Name 01/29/24 1400          Bed-Chair Transfer    Bed-Chair [place] (Transfers) minimum assist (75% patient effort);2 person assist  -     Assistive Device (Bed-Chair Transfers) walker, front-wheeled  -       Row Name 01/29/24 1400          Sit-Stand Transfer    Sit-Stand [place] (Transfers) minimum assist (75% patient effort);2 person assist;verbal cues  -     Assistive Device (Sit-Stand Transfers) walker, front-wheeled;other (see comments)  and with HHA  -       Row Name 01/29/24 1400          Stand-Sit Transfer    Stand-Sit [place] (Transfers) minimum assist (75% patient effort);2 person assist;verbal cues  -     Assistive Device (Stand-Sit Transfers) walker, front-wheeled;other (see comments)  and with [facility]  -       Row Name 01/29/24 1400          Gait/Stairs (Locomotion)    Comment, (Gait/Stairs) Patient perform stand stepping transfer but did not ambulate a significant distance today.  She needed increased time and maximum encouragement to perform stand step transfer to chair.  -       Row Name 01/29/24 1400          Plan of Care Review    Plan of Care Reviewed With patient  -     Outcome Evaluation PT treatment completed.  Following education and encouragement to participate patient performed bed mobility, transfer training and sitting balance edge of bed and edge of chair.  -       Row Name 01/29/24 1400          Positioning and Restraints    Pre-Treatment Position in bed  -     Post Treatment Position chair  -     In Chair sitting;with family/caregiver  -               User Key  (r) = Recorded By, (t) = Taken By, (c) = Cosigned By      Initials Name Provider Type    Richelle Vega, PT Physical Therapist                      PT Recommendation and Plan     Plan of Care Reviewed With: patient  Outcome Evaluation: PT treatment completed.  Following education and encouragement to participate patient  performed bed mobility, transfer training and sitting balance edge of bed and edge of chair.       Time Calculation:    PT Charges       Row Name 24 1508             Time Calculation    PT Received On 24  -      PT Goal Re-Cert Due Date 24  -         Timed Charges    70998 - PT Therapeutic Activity Minutes 23  -KP         Total Minutes    Timed Charges Total Minutes 23  -KP       Total Minutes 23  -KP                User Key  (r) = Recorded By, (t) = Taken By, (c) = Cosigned By      Initials Name Provider Type    Richelle Vega, PT Physical Therapist                  Therapy Charges for Today       Code Description Service Date Service Provider Modifiers Qty    62516012126 HC PT THERAPEUTIC ACT EA 15 MIN 2024 Richelle Saravia, PT GP 2            PT G-Codes  AM-PAC 6 Clicks Score (PT): 8    Richelle Saravia PT  2024      Electronically signed by Richelel Saravia, PT at 24 1508          Occupational Therapy Notes (most recent note)        Fabrizio Ramey, OT at 24 1505          Acute Care - Occupational Therapy Treatment Note   [facility]     Patient Name: Leonie Laws  : 1940  MRN: [id number]  Today's Date: 2024  Onset of Illness/Injury or Date of Surgery: 24 (admission date)          Admit Date: 2024       ICD-10-CM ICD-9-CM   1. Nausea and vomiting, unspecified vomiting type  R11.2 787.01   2. Dehydration  E86.0 276.51   3. Debility  R53.81 799.3     Patient Active Problem List   Diagnosis    Cystocele    Diverticulosis of large intestine    Hemorrhoids    Hyperlipidemia    Postoperative hypothyroidism    Impaired fasting glucose    Osteoporosis    Rectocele    Trigeminal neuralgia    Anxiety    Vomiting     Past Medical History:   Diagnosis Date    Anxiety     Hyperlipidemia     Trigeminal neuralgia      Past Surgical History:   Procedure Laterality Date    BREAST SURGERY      HYSTERECTOMY           OT ASSESSMENT FLOWSHEET (last 12 hours)       OT  Evaluation and Treatment       Row Name 01/29/24 1455                   OT Time and Intention    Document Type therapy note (daily note)  -KR        Mode of Treatment occupational therapy  -KR        Patient Effort adequate  -KR        Symptoms Noted During/After Treatment fatigue  -KR           Living Environment    Current Living Arrangements home  -KR        People in Home spouse  -KR           Bed Mobility    Bed Mobility rolling left;supine-sit  -KR        Rolling Left [place] (Bed Mobility) maximum assist (25% patient effort)  -KR        Supine-Sit [place] (Bed Mobility) maximum assist (25% patient effort)  -KR           Bed-Chair Transfer    Bed-Chair [place] (Transfers) moderate assist (50% patient effort);verbal cues  -KR           Motor Skills    Therapeutic Exercise shoulder;elbow/forearm;hand  -KR           Shoulder (Therapeutic Exercise)    Shoulder (Therapeutic Exercise) AROM (active range of motion)  -KR        Shoulder AROM (Therapeutic Exercise) bilateral;flexion;extension;supine  -KR           Elbow/Forearm (Therapeutic Exercise)    Elbow/Forearm (Therapeutic Exercise) AROM (active range of motion)  -KR        Elbow/Forearm AROM (Therapeutic Exercise) bilateral;flexion;extension;supine  -KR           Hand (Therapeutic Exercise)    Hand (Therapeutic Exercise) AROM (active range of motion)  -KR        Hand AROM/AAROM (Therapeutic Exercise) bilateral;finger flexion;finger extension  -KR           Plan of Care Review    Plan of Care Reviewed With patient;family  -KR           Therapy Assessment/Plan (OT)    Rehab Potential (OT) good, to achieve stated therapy goals  -KR        Planned Therapy Interventions (OT) activity tolerance training;strengthening exercise;BADL retraining;transfer/mobility retraining  -KR           Progress Summary (OT)    Progress Toward Functional Goals (OT) progress toward functional goals as expected  -KR           Therapy Plan Review/Discharge Plan (OT)     "Anticipated Discharge Disposition (OT) inpatient rehabilitation facility;home with assist  -KR                  User Key  (r) = Recorded By, (t) = Taken By, (c) = Cosigned By      Initials Name Effective Dates    KR Fabrizio Ramey OT 21 -                            OT Recommendation and Plan  Planned Therapy Interventions (OT): activity tolerance training, strengthening exercise, BADL retraining, transfer/mobility retraining  Progress Toward Functional Goals (OT): progress toward functional goals as expected  Plan of Care Review  Plan of Care Reviewed With: patient, family  Plan of Care Reviewed With: patient, family        Time Calculation:     Therapy Charges for Today       Code Description Service Date Service Provider Modifiers Qty    06302453040  OT THERAPEUTIC ACT EA 15 MIN 2024 Fabrizio Ramey OT GO 1    [id number]  OT SELF CARE/MGMT/TRAIN EA 15 MIN 2024 Fabrizio Ramey OT GO 2                 Fabrizio Ramey OT  2024    Electronically signed by Fabrizio Ramey OT at 24 1506          Speech Language Pathology Notes (most recent note)        Jennifer Devlin MS CCC-SLP at 24 1133          Acute Care - Speech Language Pathology   Swallow Initial Evaluation [facility]  CLINICAL DYSPHAGIA EVALUATION     Patient Name: Leonie Laws  : 1940  MRN: [id number]  Today's Date: 2024     Admit Date: 2024  Leonie Laws  was seen at bedside this am on [id number] to assess safety/efficacy of swallowing fnx, determine safest/least restrictive diet tolerance.     Ms Laws has a medical hx significant for anxiety, hyperlipidemia, trigeminal neuralgia, and hypothyroidism. She is unfamiliar to SLP department of [facility] [place] prior to this admission.     Per EMR review, Ms Laws was reported to have been \"getting progressively weaker over the past several weeks\" w/ poor po intake noted. Nausea and vomiting was reported to have begun the day prior to presentation to [facility] [place] and her son had reported " "concern for aspiration of her vomit as she had been noted by family to have an intermittent cough since that time.     Upon arrival to [facility] ED, \"vitals were temperature 98.4, , RR 14, /75, SpO2 98% on room air.  Labs significant for corrected sodium 130, glucose 233.  Hemoglobin A1c 5.9.  CRP 25.25, lactate 2.2, WBC 18.79 with left shift.  Chest x-ray shows left basilar atelectasis versus pneumonia.  CT abdomen/pelvis shows no acute identified findings.  Moderate constipation.  Bibasilar airspace disease most consistent with atelectasis.  CT head with no acute findings\".     Social History     Socioeconomic History    Marital status:    Tobacco Use    Smoking status: Never    Smokeless tobacco: Never   Vaping Use    Vaping Use: Never used   Substance and Sexual Activity    Alcohol use: No    Drug use: No    Sexual activity: Defer   Imaging:     Radiology Results (last 21 days)    Procedure Component Value Units Date/Time    CT Abdomen Pelvis With Contrast [[id number]] Melvin as Reviewed   Order Status: Completed Collected: 01/22/24 1720    Updated: 01/22/24 1726   Narrative:     EXAM:    CT Abdomen and Pelvis With Intravenous Contrast     EXAM DATE:    1/22/2024 5:05 PM     CLINICAL HISTORY:    abd pain/sepsis     TECHNIQUE:    Axial computed tomography images of the abdomen and pelvis with  intravenous contrast.  Sagittal and coronal reformatted images were  created and reviewed.  This CT exam was performed using one or more of  the following dose reduction techniques:  automated exposure control,  adjustment of the mA and/or kV according to patient size, and/or use of  iterative reconstruction technique.     COMPARISON:    No relevant prior studies available.     FINDINGS:    Lung bases:  Dependent bibasilar atelectasis. Lung bases otherwise  clear.    Heart:  Mild coronary artery calcifications.      ABDOMEN:    Liver:  Unremarkable as visualized.  No mass.    Gallbladder and bile ducts:  " Cholecystectomy.  No ductal dilation.    Pancreas:  Mild fatty infiltration of the pancreas.  No ductal  dilation.    Spleen:  Unremarkable as visualized.  No splenomegaly.    Adrenals:  Unremarkable as visualized.  No mass.    Kidneys and ureters:  There are bilateral renal parapelvic cysts but  no evidence of hydronephrosis.  No renal or ureteral stones.  No  obstructive uropathy.    Stomach and bowel:  Stomach is incompletely distended.  Diffuse  colonic diverticulosis without evidence of acute diverticulitis.      PELVIS:    Appendix:  The appendix is not clearly identified.    Bladder:  Distended urinary bladder without wall thickening.    Reproductive:  Hysterectomy noted.      ABDOMEN and PELVIS:    Intraperitoneal space:  Unremarkable as visualized.  No significant  fluid collection.  No pneumoperitoneum.    Bones/joints:  Degenerative changes of the lumbar spine with mild  multilevel stenosis but no acute bony findings identified.  Arthritic  changes of the bilateral hip joints.  No dislocation.    Soft tissues:  Unremarkable as visualized.    Vasculature:  Advanced calcified atherosclerosis without evidence of  aneurysm.    Lymph nodes:  Unremarkable as visualized.  No enlarged lymph nodes.   Impression:     1.  No acute findings identified within abdomen or pelvis.  2.  Urinary bladder distention without wall thickening.  3.  Prior cholecystectomy and hysterectomy.  4.  Bilateral renal parapelvic cysts. No obstructive uropathy.  5.  Colonic diverticulosis without diverticulitis.  6.  Moderate constipation. No bowel obstruction.  7.  Bibasilar airspace disease most consistent with atelectasis.  8.  Other incidental/nonacute findings above.     This report was finalized on 1/22/2024 5:24 PM by Dr. Fabrizio Fair MD.       CT Head Without Contrast [[id number]] Melvin as Reviewed   Order Status: Completed Collected: 01/22/24 1718    Updated: 01/22/24 1722   Narrative:     EXAM:    CT Head Without Intravenous  Contrast     EXAM DATE:    1/22/2024 5:05 PM     CLINICAL HISTORY:    headache     TECHNIQUE:    Axial computed tomography images of the head/brain without intravenous  contrast.  Sagittal and coronal reformatted images were created and  reviewed.  This CT exam was performed using one or more of the following  dose reduction techniques:  automated exposure control, adjustment of  the mA and/or kV according to patient size, and/or use of iterative  reconstruction technique.     COMPARISON:    No relevant prior studies available.     FINDINGS:    Brain and extra-axial spaces:  Unremarkable as visualized.  No  hemorrhage.  No significant white matter disease.  No edema.  No  ventriculomegaly. Mild age-related atrophy noted.    Bones/joints:  Unremarkable as visualized.  No acute fracture.    Soft tissues:  Unremarkable as visualized.    Sinuses:  Unremarkable as visualized.  No acute sinusitis.    Mastoid air cells:  Unremarkable as visualized.  No mastoid effusion.   Impression:       Unremarkable exam demonstrating no CT evidence of acute intracranial  findings.     This report was finalized on 1/22/2024 5:20 PM by Dr. Fabrizio Fair MD.       XR Chest 1 View [[id number]] Melvin as Reviewed   Order Status: Completed Collected: 01/22/24 1615    Updated: 01/22/24 1617   Narrative:     PROCEDURE: XR CHEST 1 VW-       HISTORY: Cough, vomiting     COMPARISON: None.     FINDINGS: The heart is normal in size. The mediastinum is unremarkable.  There are low lung volumes with a left basilar opacity either  atelectasis or pneumonia. There are no effusions. There is no  pneumothorax. There are no acute osseous abnormalities.      Impression:     Low lung volumes with left basilar atelectasis versus  pneumonia.     This report was finalized on 1/22/2024 4:15 PM by David Ch M.D..     Labs:   Latest Reference Range & Units 01/22/24 15:34 01/23/24 05:09   WBC 3.40 - 10.80 10*3/mm3 18.78 (H) 16.16 (H)   RBC 3.77 - 5.28  10*6/mm3 4.26 3.18 (L)   Hemoglobin 12.0 - 15.9 g/dL 12.6 9.3 (L)   Hematocrit 34.0 - 46.6 % 38.4 27.9 (L)   Platelets 140 - 450 10*3/mm3 399 340   RDW 12.3 - 15.4 % 14.4 14.2   MCV 79.0 - 97.0 fL 90.1 87.7   MCH 26.6 - 33.0 pg 29.6 29.2   MCHC 31.5 - 35.7 g/dL 32.8 33.3   MPV 6.0 - 12.0 fL 9.7 10.4   RDW-SD 37.0 - 54.0 fl 47.3 45.8   (H): Data is abnormally high  (L): Data is abnormally low  Diet Orders (active) (From admission, onward)       Start     Ordered    01/23/24 1136  DIET MESSAGE Diet has been NPO. Please send a lunch tray.  Once        Comments: Diet has been NPO. Please send a lunch tray.    01/23/24 1135    01/23/24 1135  Diet: Regular/House Diet; Feeding Assistance - Nursing; Texture: Soft to Chew (NDD 3); Soft to Chew: Chopped Meat; Fluid Consistency: Thin (IDDSI 0)  Diet Effective Now         01/23/24 1135                  Upon SLP presentation to pt room, MD is at bedside as well as family members. She answers MD questions appropriately though is limitedly conversant. Following MD departure from room, Ms Laws is able to introduce remaining family member as well as her relationship to pt. She is oriented to self and current situation at this time. She is able to discuss prior occupation and family relationships w/ appropriate answers. Generally flat affect is noted.     She is observed on room air w/o complications across this evaluation.    Ms Laws was repositioned to fully upright and centered in bed w/ assist from MD to accept multiple po presentations of ice chips, solid cracker, puree, and thin liquids via spoon and straw. Ms Laws initially reported that she was able to self provide po trials, however when given opportunity to do so she requested assistance as she stated she was concerned for spilling or dropping cup or utensil.     Facial/oral structures were generally symmetrical upon observation. Lingual protrusion revealed no deviation. Oral mucosa were slightly xerostomic, pink, and  clean. Secretions were clear, thin, and well controlled. OROM/NATASHA was generally weak overall to imitate oral postures. Gag is not assessed. Volitional cough was intact w/ moderately weak intensity, clear in quality, non-productive. Voice was adequate in intensity, clear in quality w/ intelligible speech.    Upon po presentations, adequate bolus anticipation and acceptance for spoon and straw presentation. She initially reports she will accept cup presentation style, however as she begins to bring cup toward labial edge w/ SLP assist for cup stabilization she declines and requests a straw. Labial seal is mildly weak for bolus clearance via spoon bowl w/ some trace oral loss of thin liquids and incomplete clearance of puree consistency from spoon bowl noted. Labial seal is additionally noted to be mildly weak for suction via straw w/ repeated attempts made prior to obtaining bolus via straw. She denies increased effort or weakness. Bolus formation, manipulation, and control appeared to be mildly weak  and prolonged overall w/ prolonged mastication of solid consistency only and mixed phasic-rotary mastication pattern demonstrated. A-p transit was timely w/o significant oral residue appreciated. No overt s/s aspiration were evidenced before the swallow.     Pharyngeal swallow was timely w/ adequate hyolaryngeal elevation per palpation. No overt s/s aspiration evidenced across this evaluation. No silent aspiration suspected. She denied odynophagia.      Visit Dx:     ICD-10-CM ICD-9-CM   1. Nausea and vomiting, unspecified vomiting type  R11.2 787.01   2. Dehydration  E86.0 276.51   3. Debility  R53.81 799.3     Patient Active Problem List   Diagnosis    Cystocele    Diverticulosis of large intestine    Hemorrhoids    Hyperlipidemia    Postoperative hypothyroidism    Impaired fasting glucose    Osteoporosis    Rectocele    Trigeminal neuralgia    Anxiety    Vomiting     Past Medical History:   Diagnosis Date    Anxiety      Hyperlipidemia     Trigeminal neuralgia      Past Surgical History:   Procedure Laterality Date    BREAST SURGERY      HYSTERECTOMY         Impression:     Ms Laws presented w/ concerns for a mild oral dysphagia w/ weak labial seal evidenced w/ some oral loss, mildly prolonged bolus formation and manipulation, and increased mastication of solid consistency only. Pharyngeal swallow was felt to be wfl.     Per this evaluation's findings w/ observed weakness during bolus prep, Ms Laws is felt to most benefit from diet initiation of soft to chew textures, chopped meats, and thin liquids. She is further felt to benefit from assistance w/ feeding per observed limitations related to self-provision across this evaluation despite Ms Laws reporting she is able to perform this task. Medications are recommended to be administered whole in puree/thins or crushed/broken PRN. Recommend diet to be advanced as Ms Laws tolerates w/ improvement in acute status.     SLP Recommendation and Plan     1. Mechanical soft textures, chopped meats, thin liquids   2. Medications whole in puree/thins. Crushed/broken PRN.  3. Upright and centered for all po intake  4. FLAVIA precautions.  5. Oral care protocol.  6. 1:1 assistance w/ po intake.   7. Universal aspiration precautions.   8. Advance po diet as tolerated.     No further formal SLP f/u warranted/recommended at this time. Diet to be advanced as pt tolerates.     D/w patient results and recommendations w/ verbal agreement.    D/w MD results and recommendations w/ verbal agreement.    Thank you for allowing me to participate in the care of your patient-  Jennifer Devlin M.S., CCC-SLP        EDUCATION  The patient has been educated in the following areas:   Dysphagia (Swallowing Impairment) Oral Care/Hydration.     Time Calculation:       Therapy Charges for Today       Code Description Service Date Service Provider Modifiers Qty    28172713863  ST EVAL ORAL PHARYNG SWALLOW 4 1/23/2024  Jennifer Devlin, MS CCC-SLP GN 1                 Jennifer Devlin MS CCC-SLP  1/23/2024    Electronically signed by Jennifer Devlin MS CCC-SLP at 01/23/24 7734

## 2024-01-30 NOTE — PLAN OF CARE
Goal Outcome Evaluation:   Pt has rested in bed overnight. VSS. Pt remains pleasantly confused. No acute changes.  Will continue POC.

## 2024-01-31 VITALS
HEART RATE: 96 BPM | HEIGHT: 68 IN | TEMPERATURE: 98.5 F | WEIGHT: 124.8 LBS | DIASTOLIC BLOOD PRESSURE: 68 MMHG | BODY MASS INDEX: 18.91 KG/M2 | RESPIRATION RATE: 18 BRPM | OXYGEN SATURATION: 94 % | SYSTOLIC BLOOD PRESSURE: 122 MMHG

## 2024-01-31 LAB
GLUCOSE BLDC GLUCOMTR-MCNC: 118 MG/DL (ref 70–130)
GLUCOSE BLDC GLUCOMTR-MCNC: 125 MG/DL (ref 70–130)

## 2024-01-31 PROCEDURE — 99239 HOSP IP/OBS DSCHRG MGMT >30: CPT | Performed by: INTERNAL MEDICINE

## 2024-01-31 PROCEDURE — 25010000002 HEPARIN (PORCINE) PER 1000 UNITS: Performed by: INTERNAL MEDICINE

## 2024-01-31 PROCEDURE — 99232 SBSQ HOSP IP/OBS MODERATE 35: CPT | Performed by: INTERNAL MEDICINE

## 2024-01-31 PROCEDURE — 82948 REAGENT STRIP/BLOOD GLUCOSE: CPT

## 2024-01-31 RX ORDER — CEFDINIR 300 MG/1
300 CAPSULE ORAL 2 TIMES DAILY
Qty: 16 CAPSULE | Refills: 0 | Status: SHIPPED | OUTPATIENT
Start: 2024-01-31 | End: 2024-02-08

## 2024-01-31 RX ORDER — LORAZEPAM 2 MG/1
2 TABLET ORAL NIGHTLY
Qty: 3 TABLET | Refills: 0 | Status: SHIPPED | OUTPATIENT
Start: 2024-01-31

## 2024-01-31 RX ORDER — GABAPENTIN 400 MG/1
400 CAPSULE ORAL NIGHTLY
Qty: 3 CAPSULE | Refills: 0 | Status: SHIPPED | OUTPATIENT
Start: 2024-01-31

## 2024-01-31 RX ADMIN — ATORVASTATIN CALCIUM 20 MG: 20 TABLET, FILM COATED ORAL at 08:40

## 2024-01-31 RX ADMIN — Medication 10 ML: at 08:40

## 2024-01-31 RX ADMIN — MEGESTROL ACETATE 200 MG: 40 SUSPENSION ORAL at 08:40

## 2024-01-31 RX ADMIN — BUPROPION HYDROCHLORIDE 150 MG: 150 TABLET, EXTENDED RELEASE ORAL at 08:40

## 2024-01-31 RX ADMIN — DOCUSATE SODIUM 50 MG AND SENNOSIDES 8.6 MG 2 TABLET: 8.6; 5 TABLET, FILM COATED ORAL at 08:40

## 2024-01-31 RX ADMIN — HEPARIN SODIUM 5000 UNITS: 5000 INJECTION INTRAVENOUS; SUBCUTANEOUS at 08:39

## 2024-01-31 NOTE — PROGRESS NOTES
PROGRESS NOTE         Patient Identification:  Name:  Leonie Laws  Age:  83 y.o.  Sex:  female  :  1940  MRN:  [id number]  Visit Number:  [id number]  Primary Care Provider:  Joe Bowden MD         LOS: 9 days       ----------------------------------------------------------------------------------------------------------------------  Subjective       Chief Complaints:    Vomiting and Weakness - Generalized        Interval History:      Patient resting comfortably in bed this morning.  Currently on room air with no apparent distress.  Lungs clear to auscultation bilaterally.  Abdomen soft, nontender.  Afebrile, denies diarrhea.  No new labs this morning.    Review of Systems:    Constitutional: no fever, chills and night sweats.  Generalized fatigue.  Eyes: no eye drainage, itching or redness.  HEENT: no mouth sores, dysphagia or nose bleed.  Respiratory: no for shortness of breath, cough or production of sputum.  Cardiovascular: no chest pain, no palpitations, no orthopnea.  Gastrointestinal: no nausea, vomiting or diarrhea. No abdominal pain, hematemesis or rectal bleeding.  Genitourinary: no dysuria or polyuria.  Hematologic/lymphatic: no lymph node abnormalities, no easy bruising or easy bleeding.  Musculoskeletal: no muscle or joint pain.  Skin: No rash and no itching.  Neurological: no loss of consciousness, no seizure, no headache.  Behavioral/Psych: no depression or suicidal ideation.  Endocrine: no hot flashes.  Immunologic: negative.    ----------------------------------------------------------------------------------------------------------------------      Objective       Current [facility] Meds:  atorvastatin, 20 mg, Oral, Daily  buPROPion XL, 150 mg, Oral, Daily  cefTRIAXone, 2,000 mg, Intravenous, Q24H  gabapentin, 400 mg, Oral, Nightly  heparin (porcine), 5,000 Units, Subcutaneous, Q12H  insulin lispro, 2-7 Units, Subcutaneous, 4x Daily AC & at Bedtime  LORazepam, 2 mg,  Oral, Nightly  megestrol, 200 mg, Oral, TID  pantoprazole, 40 mg, Oral, Q AM  senna-docusate sodium, 2 tablet, Oral, BID  sodium chloride, 10 mL, Intravenous, Q12H           ----------------------------------------------------------------------------------------------------------------------    Vital Signs:  Temp:  [97.7 °F (36.5 °C)-98.5 °F (36.9 °C)] 98.5 °F (36.9 °C)  Heart Rate:  [] 96  Resp:  [18] 18  BP: (116-131)/(56-68) 122/68  Mean Arterial Pressure (Non-Invasive) for the past 24 hrs (Last 3 readings):   Noninvasive MAP (mmHg)   01/31/24 1032 84   01/31/24 0629 91   01/30/24 1414 82     SpO2 Percentage    01/30/24 1035 01/30/24 1414 01/31/24 0629   SpO2: 95% 96% 94%     SpO2:  [94 %-96 %] 94 %  on   ;   Device (Oxygen Therapy): room air    Body mass index is 18.98 kg/m².  Wt Readings from Last 3 Encounters:   01/22/24 56.6 kg (124 lb 12.8 oz)   06/25/20 66.5 kg (146 lb 9.6 oz)   05/04/20 70.1 kg (154 lb 9.6 oz)        Intake/Output Summary (Last 24 hours) at 1/31/2024 1055  Last data filed at 1/31/2024 0900  Gross per 24 hour   Intake 820 ml   Output 500 ml   Net 320 ml     Diet: Regular/House Diet; Feeding Assistance - Nursing; Texture: Soft to Chew (NDD 3); Soft to Chew: Chopped Meat; Fluid Consistency: Thin (IDDSI 0)  ----------------------------------------------------------------------------------------------------------------------      Physical Exam:    Constitutional: Elderly and chronically ill-appearing.  Awake and alert, resting in bed this morning.  HENT:  Head: Normocephalic and atraumatic.  Mouth:  Moist mucous membranes.    Eyes:  Conjunctivae and EOM are normal.  No scleral icterus.  Neck:  Neck supple.  No JVD present.    Cardiovascular:  Normal rate, regular rhythm and normal heart sounds with no murmur. No edema.  Pulmonary/Chest:  No respiratory distress, no wheezes, no crackles, with normal breath sounds and good air movement.  On room air.    Abdominal:  Soft.  Bowel sounds are  "normal.  No distension and no tenderness.   Musculoskeletal:  No edema, no tenderness, and no deformity.  No swelling or redness of joints.  Neurological:  Alert and oriented to person and place. Pleasant.  Skin:  Skin is warm and dry.  No rash noted.  No pallor.           ----------------------------------------------------------------------------------------------------------------------              Results from last 7 days   Lab Units 01/29/24 0146 01/28/24 0304 01/27/24  0204   WBC 10*3/mm3 10.59 10.51 12.30*   HEMOGLOBIN g/dL 9.6* 8.7* 8.9*   HEMATOCRIT % 28.4* 26.0* 26.9*   MCV fL 87.4 88.7 88.2   MCHC g/dL 33.8 33.5 33.1   PLATELETS 10*3/mm3 504* 418 381     Results from last 7 days   Lab Units 01/29/24 0146 01/28/24 0304 01/27/24  0204   SODIUM mmol/L 138 138 135*   POTASSIUM mmol/L 3.8 3.4* 3.0*   MAGNESIUM mg/dL  --   --  1.9   CHLORIDE mmol/L 105 107 104   CO2 mmol/L 24.1 23.4 24.3   BUN mg/dL 5* 5* 6*   CREATININE mg/dL 0.64 0.62 0.62   CALCIUM mg/dL 8.6 8.1* 8.1*   GLUCOSE mg/dL 104* 135* 122*   Estimated Creatinine Clearance: 60.6 mL/min (by C-G formula based on SCr of 0.64 mg/dL).  No results found for: \"AMMONIA\"    Glucose   Date/Time Value Ref Range Status   01/31/2024 0632 125 70 - 130 mg/dL Final   01/30/2024 1947 159 (H) 70 - 130 mg/dL Final   01/30/2024 1648 152 (H) 70 - 130 mg/dL Final   01/30/2024 1114 136 (H) 70 - 130 mg/dL Final   01/30/2024 0637 103 70 - 130 mg/dL Final   01/29/2024 2025 152 (H) 70 - 130 mg/dL Final   01/29/2024 1639 146 (H) 70 - 130 mg/dL Final   01/29/2024 1043 104 70 - 130 mg/dL Final     Lab Results   Component Value Date    HGBA1C 5.90 (H) 01/22/2024     Lab Results   Component Value Date    TSH 0.897 01/22/2024    FREET4 1.46 12/22/2016       Blood Culture   Date Value Ref Range Status   01/22/2024 Streptococcus pyogenes (Group A) (C)  Preliminary   01/22/2024 Streptococcus pyogenes (Group A) (C)  Final     No results found for: \"URINECX\"  No results found for: " "\"WOUNDCX\"  No results found for: \"STOOLCX\"  No results found for: \"RESPCX\"  Pain Management Panel           No data to display                  ----------------------------------------------------------------------------------------------------------------------  Imaging Results (Last 24 Hours)       ** No results found for the last 24 hours. **            ----------------------------------------------------------------------------------------------------------------------    Pertinent Infectious Disease Results          Assessment/Plan       Assessment     Severe sepsis with lactic acid greater than 2 on admission  Streptococcus pyogenes bacteremia        Plan      I saw and examined the patient myself this morning with LEONARDO Guerra and discussed the findings and plan of care with her and got report from primary RN and here are my findings:    The patient is at ease, resting comfortably in bed this morning and is currently breathing effortlessly on room air without any evident distress. Upon auscultation, lung sounds are clear bilaterally, and the abdomen is soft and non-tender. The patient is afebrile, and there are no reported episodes of diarrhea. No new laboratory tests have been conducted this morning.    Recommend ceftriaxone, administered intravenously at 2 g every 24 hours, for Streptococcus pyogenes bacteremia. A 14-day antibiotic course will be required, initiated from the first negative set of blood cultures, with a tentative end date set for 2/8/2024. Considering the notable clinical improvement, it is acceptable to transition to an oral Omnicef course upon discharge, continuing for a total of 14 days, tentatively through 2/8/2024. Post-discharge, follow-up in the outpatient infectious disease clinic and repeat two sets of blood cultures for further assessment as outpatient.    ANTIMICROBIAL THERAPY    cefdinir - 300 MG  cefTRIAXone (ROCEPHIN) 2000 mg IVPB in 100 mL NS (VTB)     Code Status: "   Code Status and Medical Interventions:   Ordered at: 01/22/24 2049     Medical Intervention Limits:    NO intubation (DNI)     Code Status (Patient has no pulse and is not breathing):    No CPR (Do Not Attempt to Resuscitate)     Medical Interventions (Patient has pulse or is breathing):    Limited Support       LEONARDO Guerra  01/31/24  10:55 EST    Electronically signed by LEONARDO Guerra, 01/31/24, 10:56 AM EST.    Electronically signed by Jyothi King MD, 01/31/24, 11:00 AM EST.

## 2024-01-31 NOTE — CASE MANAGEMENT/SOCIAL WORK
Discharge Planning Assessment  [facility]     Patient Name: Leonie Laws  MRN: [id number]  Today's Date: 1/31/2024    Admit Date: 1/22/2024    Plan: SonYash provided Emergency Guardianship papers. SNF pre-auth request has been approved with clinical updates due on 2/1/24. SS notified [facility] per Ruma and bed will be available on 1/31/24. SS notified sonYash. SS will notify Dr. Saravia. SS to follow.       Discharge Plan       Row Name 01/31/24 1036       Plan    Final Discharge Disposition Code 03 - skilled nursing facility (SNF)    Final Note Pt is being discharged to [facility] on this date. [phone] and spoke with Ruma. SS spoke with pt's son Yash on this date who is agreeable for discharge and states he plans to go to NH and complete admission paperwork. SS provide RN with number to call report. PCS form filled out. EMS transportation to be arranged. SS notified Lead RN.    1220: [facility]

## 2024-01-31 NOTE — PLAN OF CARE
Goal Outcome Evaluation:              Outcome Evaluation: Patient has rested in bed this shift, PRN medication given for complaints of nausea and pain, ambulate to [facility] with x2 assist, no other complaints of request at this time, VSS, Will continue plan of care.

## 2024-01-31 NOTE — DISCHARGE SUMMARY
[facility] HOSPITALISTS DISCHARGE SUMMARY    Patient Identification:  Name:  Leonie Laws  Age:  83 y.o.  Sex:  female  :  1940  MRN:  [id number]  Visit Number:  [id number]    Date of Admission: 2024  Date of Discharge:  2024     PCP: Joe Bowden MD    DISCHARGE DIAGNOSIS  Severe Sepsis - Resolved   Pneumonia, Bacterial, treated for aspiration/pneumonitis - Resolved   Strep Pyogenes Bacteremia - Improved   Nausea/vomiting - Improved   Non-Insulin Dependent Diabetes Mellitus Type II, controlled, unknown complications  Hypothyroid  Anxiety/Depression  Gastroesophageal Reflux Disease  Dementia    CONSULTS   Consults       Date and Time Order Name Status Description    2024  3:14 PM Inpatient Infectious Diseases Consult Completed     2024 12:29 PM Inpatient Psychiatrist Consult Completed           PROCEDURES PERFORMED  None    HOSPITAL COURSE  Patient is a 83 y.o. female presented to [facility] complaining of vomiting/weakness.  Please see the admitting history and physical for further details.      #Severe Sepsis & due to Pneumonia, Bacterial, treating for aspiration/pneumonitis in setting of nausea/vomiting, though also with noted Strep Pyogenes Bacteremia  Patient met SIRS criteria due to heart rate > 90, WBC count > 12K, due to lactate > 2 patient met criteria for Severe Sepsis.  Infectious Disease consulted and followed, blood cultures cleared on repeat, Infectious Disease recommended antibiotics through  and could be de-escalated to oral upon discharge, will continue oral Cefdinir through .  Patient deemed not to have capacity, son obtained guardianship, he is agreeable to short term rehab, patient is being discharge today for rehab, follow up PCP 1 week, follow up Infectious Disease 1 week.     #Non-Insulin Dependent Diabetes Mellitus Type II, controlled, unknown complications  Continued home regimen    #Hypothyroid  Continued home  Levothyroxine    #Anxiety/Depression  Continued home regimen     #Gastroesophageal Reflux Disease  Continued home PPI    #Dementia  No home medications, supportive care    Edited by: Agusto Saravia MD at 1/31/2024 1014    VITAL SIGNS:  Temp:  [97.7 °F (36.5 °C)-98.5 °F (36.9 °C)] 98.5 °F (36.9 °C)  Heart Rate:  [] 91  Resp:  [16-18] 18  BP: (101-131)/(56-67) 131/67  SpO2:  [94 %-96 %] 94 %  on   ;   Device (Oxygen Therapy): room air    Body mass index is 18.98 kg/m².  Wt Readings from Last 3 Encounters:   01/22/24 56.6 kg (124 lb 12.8 oz)   06/25/20 66.5 kg (146 lb 9.6 oz)   05/04/20 70.1 kg (154 lb 9.6 oz)     PHYSICAL EXAM:  Constitutional:  Elderly, No acute distress.      HENT:  Head:  Normocephalic and atraumatic.  Mouth:  Moist mucous membranes.    Eyes:  Conjunctivae and EOM are normal. No scleral icterus.    Neck:  Neck supple.  No JVD present.    Cardiovascular:  Normal rate, regular rhythm and normal heart sounds with no murmur.  Pulmonary/Chest:  No respiratory distress, on room air   Abdominal:  Soft. No distension and no tenderness.   Musculoskeletal:  No tenderness, and no deformity.  No red or swollen joints anywhere.    Neurological:  Alert and oriented to person, place.  No gross focal deficits     Skin:  Skin is warm and dry. No rash noted. No pallor.   Peripheral vascular:  No clubbing, no cyanosis, no edema.  Psychiatric: Appropriate mood and affect    Edited by: Agusto Saravia MD at 1/31/2024 1014    DISCHARGE DISPOSITION   Stable    DISCHARGE MEDICATIONS:     Discharge Medications        New Medications        Instructions Start Date   cefdinir 300 MG capsule  Commonly known as: OMNICEF   300 mg, Oral, 2 Times Daily             Continue These Medications        Instructions Start Date   atorvastatin 20 MG tablet  Commonly known as: LIPITOR   20 mg, Oral, Daily      buPROPion  MG 24 hr tablet  Commonly known as: WELLBUTRIN XL   150 mg, Oral, Daily      gabapentin 400 MG  capsule  Commonly known as: NEURONTIN   400 mg, Oral, Nightly      LORazepam 2 MG tablet  Commonly known as: ATIVAN   2 mg, Oral, Nightly      meclizine 25 MG tablet  Commonly known as: ANTIVERT   25 mg, Oral, 3 Times Daily PRN      megestrol 40 MG/ML suspension  Commonly known as: MEGACE   200 mg, Oral, 3 times daily      omeprazole 20 MG capsule  Commonly known as: priLOSEC   20 mg, Oral, Daily      ondansetron 8 MG tablet  Commonly known as: ZOFRAN   8 mg, Oral, Every 8 Hours PRN             Diet Instructions       Diet: Regular/House Diet; Soft to Chew (NDD 3); Chopped Meat; Thin (IDDSI 0); Feeding Assistance - Nursing      Discharge Diet: Regular/House Diet    Texture: Soft to Chew (NDD 3)    Soft to Chew: Chopped Meat    Fluid Consistency: Thin (IDDSI 0)    Diet Modifiers / Additional Diets: Feeding Assistance - Nursing          Activity Instructions    Activity as Tolerated         Additional Instructions for the Follow-ups that You Need to Schedule       Discharge Follow-up with PCP   As directed       Currently Documented PCP:    Joe Bowden MD    PCP Phone Number:    [phone]     Follow Up Details: 1 week post hospital discharge        Discharge Follow-up with Specified Provider: ID 1 week   As directed      To: ID 1 week               Contact information for follow-up providers       Joe Bowden MD .    Specialty: Family Medicine  Why: 1 week post hospital discharge  Contact information:  [address] [facility] [address]  [facility] [address]  [phone]                       Contact information for after-discharge care       Destination       [facility] .    Service: Skilled Nursing  Contact information:  [facility] [address] [place] [id number] [phone]                                  TEST  RESULTS PENDING AT DISCHARGE  None    CODE STATUS  Code Status and Medical Interventions:   Ordered at: 01/22/24 2049     Medical Intervention  Limits:    NO intubation (DNI)     Code Status (Patient has no pulse and is not breathing):    No CPR (Do Not Attempt to Resuscitate)     Medical Interventions (Patient has pulse or is breathing):    Limited Support     Agusto Saravia MD  [facility]  01/31/24  10:14 EST    Please note that this discharge summary required more than 30 minutes to complete.

## 2024-01-31 NOTE — DISCHARGE INSTR - ACTIVITY
Activity as Tolerated   From: Kenji Mathur MD  To: Akash Bean  Sent: 3/21/2023 9:25 AM CDT  Subject: your healthcare    Dear Jael Lorenzo,    I am so glad you have found a physician closer to you. I definitely think that will make your life much easier and that is what I want for you. I wish you well.     Sincerely  Dr. Kenji Mathur

## 2024-01-31 NOTE — DISCHARGE PLACEMENT REQUEST
"Leonie Laws (83 y.o. Female)       Date of Birth   1940    Social Security Number       Address   [address]  [facility] [address]    Home Phone   [phone]    MRN   [id number]       Anabaptist   Unknown    Marital Status                               Admission Date   1/22/24    Admission Type   Emergency    Admitting Provider   Kinjal Beach DO    Attending Provider   Agusto Saravia MD    Department, Room/Bed   [address], 3329/1P       Discharge Date       Discharge Disposition   Skilled Nursing Facility (DC - External)    Discharge Destination                                 Attending Provider: Agusto Saravia MD    Allergies: Doxycycline, Gentamycin [Gentamicin], Miconazole, Penicillins, Sulfa Antibiotics    Isolation: None   Infection: None   Code Status: No CPR    Ht: 172.7 cm (68\")   Wt: 56.6 kg (124 lb 12.8 oz)    Admission Cmt: None   Principal Problem: Vomiting [R11.10]                   Active Insurance as of 1/22/2024       Primary Coverage       Payor Plan Insurance Group Employer/Plan Group    HUMANA MEDICARE REPLACEMENT HUMANA MED ADV GROUP [id number]       Payor Plan Address Payor Plan Phone Number Payor Plan Fax Number Effective Dates    [address] [address] [phone]  1/1/2015 - None Entered    [facility] [address]         Subscriber Name Subscriber Birth Date Member ID       LEONIE LAWS 1940 [id number]                     Emergency Contacts        (Rel.) Home Phone Work Phone Mobile Phone    Yash Bond (Son) [phone] -- [phone]    Giovanny Bond (Son) [phone] -- [phone]    Consuelo Bond (Relative) -- -- [phone]    Justen Laws (Spouse) [phone] -- --              Insurance Information                  HUMANA MEDICARE REPLACEMENT/HUMANA MED ADV GROUP Phone: [phone]    Subscriber: Leonie Laws Subscriber#: [id number]    Group#: [id number] Precert#: [id number]             History & Physical    "     Khalida Castro PA-C at 01/1940       Attestation signed by Kinjal Beach DO at 01/22/24 2091    I have reviewed this documentation and patient also briefly seen and examined at bedside in .  Patient's , son, daughter-in-law and granddaughter are also present at bedside in addition to GLORIA Shen.    Patient with intermittent episodes of significant right lower extremity tremors versus seizure-like activity.  For this reason, the patient has remained in the ED and is currently connected to [facility] with a 0% seizure burden; will continue for minimum of 1 hours prior to transfer to the floor; d/w GLORIA Shen who verbalized understandin.     Patient does not answer questions at this time.  Patient appears significantly pale but is in no acute distress.  Significant lower extremity edema, no abdominal tenderness palpation, bowel sounds are slightly hypoactive, lungs are diminished at bilateral bases but otherwise clear to auscultation anteriorly.  Heart was regular rate without obvious murmur.    Patient now noted to have a temperature of 101.3 °F which makes aspiration pneumonia versus aspiration pneumonitis higher on the differential in the patient with significant vomiting. Hyponatremia likely hypovolemic and due to decreased PO intake/GI losses. Patient to be started on IV fluids as noted below.    -For now, patient will be continued on empiric antibiotic therapy with vancomycin, Azactam and Flagyl pending final blood culture results  -Supportive care with Tylenol as needed  -Aspiration precautions with bedside nurse dysphagia screen; consider SLP consult  -Patient will be be started on NS @ 100 ml/hr  -PRN IV Zofran  -Check TSH, Mg, Vitamin B12 and folate  -PT and OT consults; CM/ consult for possible placement                           [facility]  HISTORY & PHYSICAL    Patient Identification:  Name:  Leonie Laws  Age:  83 y.o.  Sex:   female  :  1940  MRN:  [id number]   Visit Number:  [id number]  Admit Date: 2024   Primary Care Physician:  Joe Bowden MD     Subjective     Chief complaint:   Chief Complaint   Patient presents with    Vomiting    Weakness - Generalized     History of presenting illness:   Patient is a 83 y.o. female with past medical history significant for anxiety, hyperlipidemia, trigeminal neuralgia, hypothyroidism that presented to the [facility] emergency department for evaluation of generalized weakness.     Patient examined at bedside in ED.  Numerous family members at bedside.  Patient's  states that the patient has been getting progressively weaker over the past several weeks.  Has had poor p.o. intake as well.  He notes that if he was able to get her to eat and drink, then she would because patient seems to be less weak, but he has trouble getting her to comply.  He notes that the nausea and vomiting began yesterday.  Son states patient has been exhibiting signs of dementia for the last couple years, but patient is very offended by any mention of possibly having dementia.  Son states it is difficult to get her to seek care, patient felt so ill that she was going to come to the ER.  Son states that last night she appeared to have aspirated on her vomit and has had an intermittent cough since then.  Mentation appears to be waxing and waning on exam.  Patient was oriented to self, but not location or time.  She was oriented x 3 earlier in the ED.  After my initial exam when I stepped out of the room to speak with family members, son brought to my attention that the patient was having rhythmic jerking of her right lower extremity he states has been intermittently happening over the past hour or so.    Upon arrival to the ED, vitals were temperature 98.4, , RR 14, /75, SpO2 98% on room air.  Labs significant for corrected sodium 130, glucose 233.  Hemoglobin A1c 5.9.  CRP  25.25, lactate 2.2, WBC 18.79 with left shift.  Chest x-ray shows left basilar atelectasis versus pneumonia.  CT abdomen/pelvis shows no acute identified findings.  Moderate constipation.  Bibasilar airspace disease most consistent with atelectasis.  CT head with no acute findings.    Patient has been admitted to .  ---------------------------------------------------------------------------------------------------------------------   Review of Systems   Unable to perform ROS: Dementia      ---------------------------------------------------------------------------------------------------------------------   Past Medical History:   Diagnosis Date    Anxiety     Hyperlipidemia     Trigeminal neuralgia      Past Surgical History:   Procedure Laterality Date    BREAST SURGERY      HYSTERECTOMY       Family History   Problem Relation Age of Onset    Stroke Mother     Heart disease Father      Social History     Socioeconomic History    Marital status:    Tobacco Use    Smoking status: Never    Smokeless tobacco: Never   Substance and Sexual Activity    Alcohol use: No    Drug use: No     ---------------------------------------------------------------------------------------------------------------------   Allergies:  Doxycycline, Gentamycin [gentamicin], Miconazole, Penicillins, and Sulfa antibiotics  ---------------------------------------------------------------------------------------------------------------------   Medications below are reported home medications pulling from within the system; at this time, these medications have not been reconciled unless otherwise specified and are in the verification process for further verifcation as current home medications.    Prior to Admission Medications       Prescriptions Last Dose Informant Patient Reported? Taking?    Cholecalciferol (VITAMIN D-3) 1000 UNITS capsule   Yes No    Take 1 capsule by mouth.    estradiol (CLIMARA) 0.05 MG/24HR patch   No No    place  one PATCH ON THE SKIN AS DIRECTED by provider one time PER WEEK    estradiol (MINIVELLE, VIVELLE-DOT) 0.05 MG/24HR patch   No No    Place 1 patch on the skin as directed by provider 1 (One) Time Per Week.    gabapentin (Neurontin) 400 MG capsule   No No    Take 1 capsule by mouth 2 (Two) Times a Day.    LORazepam (ATIVAN) 2 MG tablet   No No    Take 1 tablet by mouth At Night As Needed for Anxiety.    olopatadine (PATANOL) 0.1 % ophthalmic solution   No No    Administer 1 drop to both eyes 2 (Two) Times a Day.    SYNTHROID 100 MCG tablet   No No    Take 1 tablet by mouth Daily.          ---------------------------------------------------------------------------------------------------------------------    Objective     Hospital Scheduled Meds:            Current listed hospital scheduled medications may not yet reflect those currently placed in orders that are signed and held, awaiting patient's arrival to floor/unit.    ---------------------------------------------------------------------------------------------------------------------   Vital Signs:  Temp:  [98.4 °F (36.9 °C)] 98.4 °F (36.9 °C)  Heart Rate:  [] 98  Resp:  [14] 14  BP: (126-146)/(58-75) 135/61  Mean Arterial Pressure (Non-Invasive) for the past 24 hrs (Last 3 readings):   Noninvasive MAP (mmHg)   01/22/24 1855 90   01/22/24 1840 89   01/22/24 1825 89     SpO2 Percentage    01/22/24 1840 01/22/24 1855 01/22/24 2030   SpO2: 98% 96% 98%     SpO2:  [95 %-98 %] 98 %  on   ;   Device (Oxygen Therapy): room air    Body mass index is 19.46 kg/m².  Wt Readings from Last 3 Encounters:   01/22/24 58.1 kg (128 lb)   06/25/20 66.5 kg (146 lb 9.6 oz)   05/04/20 70.1 kg (154 lb 9.6 oz)     ---------------------------------------------------------------------------------------------------------------------   Physical Exam:  Physical Exam  Constitutional:       General: She is not in acute distress.     Appearance: Normal appearance.   HENT:      Head:  Normocephalic and atraumatic.      Right Ear: External ear normal.      Left Ear: External ear normal.      Nose: No nasal deformity.      Mouth/Throat:      Lips: Pink.      Mouth: Mucous membranes are moist.   Eyes:      Conjunctiva/sclera: Conjunctivae normal.      Pupils: Pupils are equal, round, and reactive to light.   Cardiovascular:      Rate and Rhythm: Normal rate and regular rhythm.      Pulses:           Dorsalis pedis pulses are 2+ on the right side and 2+ on the left side.      Heart sounds: Normal heart sounds.   Pulmonary:      Effort: Pulmonary effort is normal.      Breath sounds: Normal breath sounds. No wheezing, rhonchi or rales.   Abdominal:      General: Abdomen is flat. Bowel sounds are normal.      Palpations: Abdomen is soft.      Tenderness: There is no guarding or rebound.   Genitourinary:     Comments: No lyle catheter in place   Musculoskeletal:      Cervical back: Neck supple. Normal range of motion.      Right lower leg: No edema.      Left lower leg: No edema.   Skin:     General: Skin is warm and dry.   Neurological:      General: No focal deficit present.      Mental Status: She is alert. She is disoriented.      Comments: Rhythmic jerking of right lower extremity     Oriented to self only    Psychiatric:         Mood and Affect: Mood normal.         Behavior: Behavior normal.       ---------------------------------------------------------------------------------------------------------------------  EKG: Sinus rhythm.  Heart rate 99.  Awaiting formal cardiology read.          I have personally reviewed the EKG/Telemetry strip  ---------------------------------------------------------------------------------------------------------------------   Results from last 7 days   Lab Units 01/22/24  1534   HSTROP T ng/L <6           Results from last 7 days   Lab Units 01/22/24  1930 01/22/24  1542 01/22/24  1534   CRP mg/dL  --   --  25.25*   LACTATE mmol/L 1.8 2.2*  --    WBC 10*3/mm3   "--   --  18.78*   HEMOGLOBIN g/dL  --   --  12.6   HEMATOCRIT %  --   --  38.4   MCV fL  --   --  90.1   MCHC g/dL  --   --  32.8   PLATELETS 10*3/mm3  --   --  399     Results from last 7 days   Lab Units 01/22/24  1534   SODIUM mmol/L 127*   POTASSIUM mmol/L 4.6   MAGNESIUM mg/dL 2.0   CHLORIDE mmol/L 89*   CO2 mmol/L 23.9   BUN mg/dL 16   CREATININE mg/dL 0.95   CALCIUM mg/dL 9.7   GLUCOSE mg/dL 233*   ALBUMIN g/dL 2.7*   BILIRUBIN mg/dL 0.6   ALK PHOS U/L 118*   AST (SGOT) U/L 38*   ALT (SGPT) U/L 32   Estimated Creatinine Clearance: 41.9 mL/min (by C-G formula based on SCr of 0.95 mg/dL).  No results found for: \"AMMONIA\"    Hemoglobin A1C   Date/Time Value Ref Range Status   01/22/2024 1534 5.90 (H) 4.80 - 5.60 % Final     Glucose   Date/Time Value Ref Range Status   01/22/2024 1506 224 (H) 70 - 130 mg/dL Final     Lab Results   Component Value Date    HGBA1C 5.90 (H) 01/22/2024     Lab Results   Component Value Date    TSH 2.659 02/26/2019    FREET4 1.46 12/22/2016       No results found for: \"BLOODCX\"  No results found for: \"URINECX\"  No results found for: \"WOUNDCX\"  No results found for: \"STOOLCX\"  No results found for: \"RESPCX\"  No results found for: \"MRSACX\"  Pain Management Panel           No data to display              I have personally reviewed the above laboratory results.   ---------------------------------------------------------------------------------------------------------------------  Imaging Results (Last 7 Days)       Procedure Component Value Units Date/Time    CT Abdomen Pelvis With Contrast [[id number]] Collected: 01/22/24 1720     Updated: 01/22/24 1726    Narrative:      EXAM:    CT Abdomen and Pelvis With Intravenous Contrast     EXAM DATE:    1/22/2024 5:05 PM     CLINICAL HISTORY:    abd pain/sepsis     TECHNIQUE:    Axial computed tomography images of the abdomen and pelvis with  intravenous contrast.  Sagittal and coronal reformatted images were  created and reviewed.  This CT exam " was performed using one or more of  the following dose reduction techniques:  automated exposure control,  adjustment of the mA and/or kV according to patient size, and/or use of  iterative reconstruction technique.     COMPARISON:    No relevant prior studies available.     FINDINGS:    Lung bases:  Dependent bibasilar atelectasis. Lung bases otherwise  clear.    Heart:  Mild coronary artery calcifications.      ABDOMEN:    Liver:  Unremarkable as visualized.  No mass.    Gallbladder and bile ducts:  Cholecystectomy.  No ductal dilation.    Pancreas:  Mild fatty infiltration of the pancreas.  No ductal  dilation.    Spleen:  Unremarkable as visualized.  No splenomegaly.    Adrenals:  Unremarkable as visualized.  No mass.    Kidneys and ureters:  There are bilateral renal parapelvic cysts but  no evidence of hydronephrosis.  No renal or ureteral stones.  No  obstructive uropathy.    Stomach and bowel:  Stomach is incompletely distended.  Diffuse  colonic diverticulosis without evidence of acute diverticulitis.      PELVIS:    Appendix:  The appendix is not clearly identified.    Bladder:  Distended urinary bladder without wall thickening.    Reproductive:  Hysterectomy noted.      ABDOMEN and PELVIS:    Intraperitoneal space:  Unremarkable as visualized.  No significant  fluid collection.  No pneumoperitoneum.    Bones/joints:  Degenerative changes of the lumbar spine with mild  multilevel stenosis but no acute bony findings identified.  Arthritic  changes of the bilateral hip joints.  No dislocation.    Soft tissues:  Unremarkable as visualized.    Vasculature:  Advanced calcified atherosclerosis without evidence of  aneurysm.    Lymph nodes:  Unremarkable as visualized.  No enlarged lymph nodes.       Impression:      1.  No acute findings identified within abdomen or pelvis.  2.  Urinary bladder distention without wall thickening.  3.  Prior cholecystectomy and hysterectomy.  4.  Bilateral renal parapelvic cysts.  No obstructive uropathy.  5.  Colonic diverticulosis without diverticulitis.  6.  Moderate constipation. No bowel obstruction.  7.  Bibasilar airspace disease most consistent with atelectasis.  8.  Other incidental/nonacute findings above.        This report was finalized on 1/22/2024 5:24 PM by Dr. Fabrizio Fair MD.       CT Head Without Contrast [[id number]] Collected: 01/22/24 1718     Updated: 01/22/24 1722    Narrative:      EXAM:    CT Head Without Intravenous Contrast     EXAM DATE:    1/22/2024 5:05 PM     CLINICAL HISTORY:    headache     TECHNIQUE:    Axial computed tomography images of the head/brain without intravenous  contrast.  Sagittal and coronal reformatted images were created and  reviewed.  This CT exam was performed using one or more of the following  dose reduction techniques:  automated exposure control, adjustment of  the mA and/or kV according to patient size, and/or use of iterative  reconstruction technique.     COMPARISON:    No relevant prior studies available.     FINDINGS:    Brain and extra-axial spaces:  Unremarkable as visualized.  No  hemorrhage.  No significant white matter disease.  No edema.  No  ventriculomegaly. Mild age-related atrophy noted.    Bones/joints:  Unremarkable as visualized.  No acute fracture.    Soft tissues:  Unremarkable as visualized.    Sinuses:  Unremarkable as visualized.  No acute sinusitis.    Mastoid air cells:  Unremarkable as visualized.  No mastoid effusion.       Impression:        Unremarkable exam demonstrating no CT evidence of acute intracranial  findings.        This report was finalized on 1/22/2024 5:20 PM by Dr. Fabrizio Fair MD.       XR Chest 1 View [[id number]] Collected: 01/22/24 1615     Updated: 01/22/24 1617    Narrative:      PROCEDURE: XR CHEST 1 VW-       HISTORY: Cough, vomiting     COMPARISON: None.     FINDINGS: The heart is normal in size. The mediastinum is unremarkable.  There are low lung volumes with a left basilar opacity  either  atelectasis or pneumonia. There are no effusions. There is no  pneumothorax. There are no acute osseous abnormalities.       Impression:      Low lung volumes with left basilar atelectasis versus  pneumonia.        This report was finalized on 1/22/2024 4:15 PM by David Ch M.D..             I have personally reviewed the above radiology results.     Last Echocardiogram:    ---------------------------------------------------------------------------------------------------------------------    Assessment & Plan      Active Hospital Problems    Diagnosis  POA    **Vomiting [R11.10]  Yes     Nausea with vomiting, POA  Generalized weakness, POA  SIRS versus severe sepsis, POA   Dementia  Generalized weakness over the past several weeks.  Nausea with vomiting over the past 24 hours.  Continue as needed Zofran for nausea management.  Patient does meet SIRS criteria at this time without definite source of infection for sepsis. Continue to monitor vital signs. HR>90 WBC >12  There is a concern for possible aspiration pneumonia given vomiting and apparent witnessed aspiration per son, as well as elevated inflammatory markers on initial lab work.  Respiratory PCR negative   SLP consult to evaluate risk of aspiration.  Antibiotic coverage with vancomycin, Azactam and Flagyl to cover for suspected aspiration pneumonia/pneumonitis.  Blood cultures ordered at ER.  PT/OT consults due to generalized weakness.  Tremor versus seizure-like activity, POA  Patient with intermittent rhythmic moving of right lower extremity.  Cerebell ordered while patient in ED, current seizure burden 0%, will continue to monitor on Cerebell for ~1hr before moving to the floor.   Hyponatremia, POA  Corrected sodium of 130.  Small send NS given in ED.  Continue IV NS 30 mill per hour.  Repeat CMP in the a.m.  Hyperglycemia without diagnosis of type II diabetes mellitus,  POA  History of prediabetes  Glucose 233 in the ED.  Hemoglobin A1c  5.9.  Family notes history of prediabetes but no history of type II DM.  Accu-Cheks ordered to trend glucose, hypoglycemia protocol in place.  CHRONIC MEDICAL PROBLEMS  Anxiety  Hyperlipidemia  Trigeminal neuralgia  Hypothyroidism  Restart home meds pending formal med rec.  TSH ordered.  Supportive care    F/E/N: IV NS at 100 mill per hour.  Continue monitor electrolytes.  NPO.    ---------------------------------------------------  DVT Prophylaxis: subcu lovenox  Activity: up with assistance     The patient is considered to be a high risk patient due to: generalized weakness, SIRS     INPATIENT status due to the need for care which can only be reasonably provided in an hospital setting such as aggressive/expedited ancillary services and/or consultation services, the necessity for IV medications, close physician monitoring and/or the possible need for procedures.  In such, I feel patient’s risk for adverse outcomes and need for care warrant INPATIENT evaluation and predict the patient’s care encounter to likely last beyond 2 midnights.      Code Status: DNR/DNI. Discussed with  and son at bedside.     Disposition/Discharge planning: Pending clinical course, may need placement, CM/SS consulted     I have discussed the patient's assessment and plan with Dr. Baylee Castro PA-C  Hospitalist Service -- [facility]       01/22/24  20:48 EST    Attending Physician: Dr. Beach       Electronically signed by Kinjal Beach DO at 01/22/24 8429       Current Facility-Administered Medications   Medication Dose Route Frequency Provider Last Rate Last Admin    acetaminophen (TYLENOL) tablet 650 mg  650 mg Oral Q6H PRN Kinjal Beach DO   650 mg at 01/30/24 3708    Or    acetaminophen (TYLENOL) suppository 650 mg  650 mg Rectal Q4H PRN Kinjal Beach DO        atorvastatin (LIPITOR) tablet 20 mg  20 mg Oral Daily Kinjal Beach DO   20 mg at 01/31/24 0879     sennosides-docusate (PERICOLACE) 8.6-50 MG per tablet 2 tablet  2 tablet Oral BID Kinjal Beach DO   2 tablet at 01/31/24 0840    And    polyethylene glycol (MIRALAX) packet 17 g  17 g Oral Daily PRN Kinjal Beach DO        And    bisacodyl (DULCOLAX) EC tablet 5 mg  5 mg Oral Daily PRN Kinjal Beach DO        And    bisacodyl (DULCOLAX) suppository 10 mg  10 mg Rectal Daily PRN Kinjal Beach DO        buPROPion XL (WELLBUTRIN XL) 24 hr tablet 150 mg  150 mg Oral Daily Kinjal Beach DO   150 mg at 01/31/24 0840    cefTRIAXone (ROCEPHIN) 2,000 mg in sodium chloride 0.9 % 100 mL IVPB-VTB  2,000 mg Intravenous Q24H Shandra Burroughs APRN   Stopped at 01/30/24 1253    dextrose (D50W) (25 g/50 mL) IV injection 25 g  25 g Intravenous Q15 Min PRN Kinjal Beahc DO        dextrose (GLUTOSE) oral gel 15 g  15 g Oral Q15 Min PRN Kinjal Beach DO        diazePAM (VALIUM) injection 5 mg  5 mg Intramuscular Q4H PRN Narinder Maravilla APRN   5 mg at 01/29/24 0051    gabapentin (NEURONTIN) capsule 400 mg  400 mg Oral Nightly Kinjal Beach DO   400 mg at 01/30/24 2046    glucagon HCl (Diagnostic) injection 1 mg  1 mg Intramuscular Q15 Min PRN Kinjal Beach DO        heparin (porcine) 5000 UNIT/ML injection 5,000 Units  5,000 Units Subcutaneous Q12H Kinjal Beach DO   5,000 Units at 01/31/24 0839    Insulin Lispro (humaLOG) injection 2-7 Units  2-7 Units Subcutaneous 4x Daily AC & at Bedtime Kinjal Beach DO   2 Units at 01/30/24 2045    LORazepam (ATIVAN) tablet 2 mg  2 mg Oral Nightly Kinjal Beach DO   2 mg at 01/30/24 2045    meclizine (ANTIVERT) tablet 25 mg  25 mg Oral TID PRN Kinjal Beach DO        megestrol (MEGACE) 40 MG/ML suspension 200 mg  200 mg Oral TID Kinjal Beach DO   200 mg at 01/31/24 0840    melatonin tablet 5 mg  5 mg Oral Nightly PRN Narinder Maravilla APRN   5 mg at 01/29/24 2024    nitroglycerin (NITROSTAT) SL  tablet 0.4 mg  0.4 mg Sublingual Q5 Min PRN Kinjal Beach, DO        ondansetron (ZOFRAN) injection 4 mg  4 mg Intravenous Q6H PRN Kinjal Beach, DO   4 mg at 24 2317    ondansetron (ZOFRAN) tablet 8 mg  8 mg Oral Q8H PRN Kinjal Beach, DO   8 mg at 24 1743    pantoprazole (PROTONIX) EC tablet 40 mg  40 mg Oral Q AM Baylee, Kinjal Grissom, DO   40 mg at 24 0530    sodium chloride 0.9 % flush 10 mL  10 mL Intravenous PRN Baylee, Kinjal Grissom, DO        sodium chloride 0.9 % flush 10 mL  10 mL Intravenous Q12H BayleeKinjal shaikh, DO   10 mL at 24 0840    sodium chloride 0.9 % flush 10 mL  10 mL Intravenous PRN Kinjal Beach, DO        sodium chloride 0.9 % infusion 40 mL  40 mL Intravenous PRN Kinjal Beach, DO            Physician Progress Notes (most recent note)        Agusto Saravia MD at 24 1100              [facility]     [facility] Identification:  Name:  Leonie Laws  Age:  83 y.o.  Sex:  female  :  1940  MRN:  [id number]  Visit Number:  [id number]  ROOM: [address] [place] [address]     Primary Care Provider:  Joe Bowden MD    Length of stay in inpatient status:  8    Subjective     Chief Compliant:    Chief Complaint   Patient presents with    Vomiting    Weakness - Generalized     History of Presenting Illness:    Patient remains ill but stable today, no acute events overnight, no new complaints, denies any fevers or chills, continued on antibiotics, per Social Work are looking for rehab placement following conversations with patient's son, patient without new complaints today, didn't seem that supportive of going to rehab but also does not remember having conversation with me from the day prior about her care.   Objective     Current Hospital Meds:  atorvastatin, 20 mg, Oral, Daily  buPROPion XL, 150 mg, Oral, Daily  cefTRIAXone, 2,000 mg, Intravenous, Q24H  gabapentin, 400 mg, Oral, Nightly  heparin  (porcine), 5,000 Units, Subcutaneous, Q12H  insulin lispro, 2-7 Units, Subcutaneous, 4x Daily AC & at Bedtime  LORazepam, 2 mg, Oral, Nightly  megestrol, 200 mg, Oral, TID  pantoprazole, 40 mg, Oral, Q AM  senna-docusate sodium, 2 tablet, Oral, BID  sodium chloride, 10 mL, Intravenous, Q12H    ----------------------------------------------------------------------------------------------------------------------  Vital Signs:  Temp:  [97.8 °F (36.6 °C)-98.7 °F (37.1 °C)] 98.5 °F (36.9 °C)  Heart Rate:  [] 93  Resp:  [16-18] 16  BP: (101-169)/(57-84) 101/57  SpO2:  [95 %-97 %] 95 %  on   ;   Device (Oxygen Therapy): room air  Body mass index is 18.98 kg/m².      Intake/Output Summary (Last 24 hours) at 1/30/2024 1100  Last data filed at 1/30/2024 0943  Gross per 24 hour   Intake 530 ml   Output --   Net 530 ml      ----------------------------------------------------------------------------------------------------------------------  Physical exam:  Constitutional:  Elderly, No acute distress.      HENT:  Head:  Normocephalic and atraumatic.  Mouth:  Moist mucous membranes.    Eyes:  Conjunctivae and EOM are normal. No scleral icterus.    Neck:  Neck supple.  No JVD present.    Cardiovascular:  Normal rate, regular rhythm and normal heart sounds with no murmur.  Pulmonary/Chest:  No respiratory distress, no wheezes, no crackles, with normal breath sounds and good air movement.  Abdominal:  Soft. No distension and no tenderness.   Musculoskeletal:  No tenderness, and no deformity.  No red or swollen joints anywhere.    Neurological:  Alert and oriented to person, place.  No gross focal deficits     Skin:  Skin is warm and dry. No rash noted. No pallor.   Peripheral vascular:  No clubbing, no cyanosis, no edema.  Psychiatric: Appropriate mood and affect    Edited by: Agusto Saravia MD at 1/30/2024  "1058  ----------------------------------------------------------------------------------------------------------------------     No results found for: \"URINECX\"  Blood Culture   Date Value Ref Range Status   01/25/2024 No growth at 4 days  Preliminary   01/25/2024 No growth at 4 days  Preliminary     I have personally looked at the labs and they are summarized above.  ----------------------------------------------------------------------------------------------------------------------  Detailed radiology reports for the last 24 hours:  No radiology results for the last day  Assessment & Plan    #Severe Sepsis & due to Pneumonia, Bacterial, treating for aspiration/pneumonitis in setting of nausea/vomiting, though also with noted Strep Pyogenes Bacteremia  - Patient met SIRS criteria due to heart rate > 90, WBC count > 12K  - Due to lactate > 2 patient met criteria for Severe Sepsis  - Infectious Disease consulted and following   - Continue Ceftriaxone for now  - Will need antibiotics through 2/8 per Infectious Disease, can transition to oral antibiotics upon discharge  - Continue Tylenol as needed for fevers, monitor on telemetry, monitor for clinical improvement     #Non-Insulin Dependent Diabetes Mellitus Type II, controlled, unknown complications  - Holding home oral agents, continue FSBG and SSI, add long acting as indicated.    #Hypothyroid  - Continue home Levothyroxine    #Anxiety/Depression  - Continue home regimen     #Gastroesophageal Reflux Disease  - Continue home PPI    #Dementia  - No home medications, supportive care    F: Oral  E: Monitor & Replace as needed   N: Diet: Regular/House Diet; Feeding Assistance - Nursing; Texture: Soft to Chew (NDD 3); Soft to Chew: Chopped Meat; Fluid Consistency: Thin (IDDSI 0)   Ppx: SQH   Code Status (Patient has no pulse and is not breathing): No CPR   Medical Interventions (Patient has pulse or is breathing): Limited Support  Medical Intervention Limits: NO " intubation (DNI)     Dispo: Pending clinical improvement, PT/OT consulted and following, Social Work consulted and following for placement      *This patient is considered high risk due to sepsis, Pneumonia, bacteremia.    Edited by: Agusto Saravia MD at 2024 1100  [facility] Hospitalist        Electronically signed by Agusto Saravia MD at 24 1104          Physical Therapy Notes (most recent note)        Willy Moulton, PT at 24 1120  Version 1 of 1         Acute Care - Physical Therapy Treatment Note  [facility]     Patient Name: Leonie Laws  : 1940  MRN: [id number]  Today's Date: 2024   Onset of Illness/Injury or Date of Surgery: 24 (admission date)  Visit Dx:     ICD-10-CM ICD-9-CM   1. Nausea and vomiting, unspecified vomiting type  R11.2 787.01   2. Dehydration  E86.0 276.51   3. Debility  R53.81 799.3     Patient Active Problem List   Diagnosis    Cystocele    Diverticulosis of large intestine    Hemorrhoids    Hyperlipidemia    Postoperative hypothyroidism    Impaired fasting glucose    Osteoporosis    Rectocele    Trigeminal neuralgia    Anxiety    Vomiting     Past Medical History:   Diagnosis Date    Anxiety     Hyperlipidemia     Trigeminal neuralgia      Past Surgical History:   Procedure Laterality Date    BREAST SURGERY      HYSTERECTOMY       PT Assessment (last 12 hours)       PT Evaluation and Treatment       Row Name 24 1116          Physical Therapy Time and Intention    Document Type therapy note (daily note)  -KM     Mode of Treatment physical therapy  -KM     Patient Effort adequate  -KM     Symptoms Noted During/After Treatment fatigue  -KM       Row Name 24 1116          General Information    Patient Profile Reviewed yes  -KM     Patient Observations alert;cooperative;agree to therapy  -KM     Existing Precautions/Restrictions fall  -KM       Row Name 24 1116          Cognition    Affect/Mental Status (Cognition) flat/blunted  affect  -KM     Follows Commands (Cognition) WFL  -       Row Name 01/30/24 1116          Bed Mobility    Bed Mobility supine-sit  -KM     Supine-Sit [place] (Bed Mobility) moderate assist (50% patient effort)  -KM     Bed Mobility, Safety Issues decreased use of arms for pushing/pulling;decreased use of legs for bridging/pushing;impaired trunk control for bed mobility  -     Assistive Device (Bed Mobility) head of bed elevated;draw sheet  -       Row Name 01/30/24 1116          Transfers    Transfers sit-stand transfer;stand-sit transfer;toilet transfer  -       Row Name 01/30/24 1116          Sit-Stand Transfer    Sit-Stand [place] (Transfers) minimum assist (75% patient effort);moderate assist (50% patient effort)  -       Row Name 01/30/24 1116          Stand-Sit Transfer    Stand-Sit [place] (Transfers) minimum assist (75% patient effort);moderate assist (50% patient effort)  -       Row Name 01/30/24 1116          Toilet Transfer    Type (Toilet Transfer) stand pivot/stand step  -     [place] Level (Toilet Transfer) moderate assist (50% patient effort)  -       Row Name 01/30/24 1116          Gait/Stairs (Locomotion)    Comment, (Gait/Stairs) pt. took 3-4 pivot steps from bed-commode  -       Row Name 01/30/24 1116          Safety Issues, Functional Mobility    Impairments Affecting Function (Mobility) endurance/activity tolerance;strength  -       Row Name 01/30/24 1116          Progress Summary (PT)    Daily Progress Summary (PT) Pt. was able to perform bed mobility w/ modA. She performed transfers w/ modA. She tolerated session well w/ complaints of fatigue. Pt. would continue to benefit from skilled PT services.  -KM               User Key  (r) = Recorded By, (t) = Taken By, (c) = Cosigned By      Initials Name Provider Type    Willy Guadarrama, PT Physical Therapist                      PT Recommendation and Plan     Progress Summary (PT)  Daily Progress Summary  (PT): Pt. was able to perform bed mobility w/ modA. She performed transfers w/ modA. She tolerated session well w/ complaints of fatigue. Pt. would continue to benefit from skilled PT services.       Time Calculation:    PT Charges       Row Name 24 1116             Time Calculation    PT Received On 24  -KM         Time Calculation- PT    Total Timed Code Minutes- PT 15 minute(s)  -KM                User Key  (r) = Recorded By, (t) = Taken By, (c) = Cosigned By      Initials Name Provider Type    Willy Guadarrama, PT Physical Therapist                  Therapy Charges for Today       Code Description Service Date Service Provider Modifiers Qty    33042585513 HC PT THERAPEUTIC ACT EA 15 MIN 2024 Willy Moulton, PT GP 1            PT G-Codes  AM-PAC 6 Clicks Score (PT): 8    Willy Moulton PT  2024      Electronically signed by Willy Moulton, PT at 24 1121          Occupational Therapy Notes (most recent note)        Fabrizio Ramey, OT at 24 1040          Acute Care - Occupational Therapy Treatment Note  [facility]     Patient Name: Leonie Laws  : 1940  MRN: [id number]  Today's Date: 2024  Onset of Illness/Injury or Date of Surgery: 24 (admission date)          Admit Date: 2024       ICD-10-CM ICD-9-CM   1. Nausea and vomiting, unspecified vomiting type  R11.2 787.01   2. Dehydration  E86.0 276.51   3. Debility  R53.81 799.3     Patient Active Problem List   Diagnosis    Cystocele    Diverticulosis of large intestine    Hemorrhoids    Hyperlipidemia    Postoperative hypothyroidism    Impaired fasting glucose    Osteoporosis    Rectocele    Trigeminal neuralgia    Anxiety    Vomiting     Past Medical History:   Diagnosis Date    Anxiety     Hyperlipidemia     Trigeminal neuralgia      Past Surgical History:   Procedure Laterality Date    BREAST SURGERY      HYSTERECTOMY           OT ASSESSMENT FLOWSHEET (last 12 hours)       OT Evaluation and Treatment       Row  Name 01/30/24 1035                   OT Time and Intention    Document Type therapy note (daily note)  -KR        Mode of Treatment occupational therapy  -KR        Patient Effort adequate  -KR           Cognition    Affect/Mental Status (Cognition) flat/blunted affect  -KR        Follows Commands (Cognition) WFL  -KR           Toileting Assessment/Training    [place] Level (Toileting) toileting skills;moderate assist (50% patient effort);maximum assist (25% patient effort)  -KR           Toilet Transfer    Type (Toilet Transfer) stand pivot/stand step  -KR        [place] Level (Toilet Transfer) moderate assist (50% patient effort)  -KR           Plan of Care Review    Plan of Care Reviewed With patient  -KR           Therapy Assessment/Plan (OT)    Comment, Therapy Assessment/Plan (OT) Pt displayed increased cooperation with activity on this date. Fxl mobility/transfer training performed to enhance fxl status/endurance. OT to continue as tolerated.  -KR           Progress Summary (OT)    Progress Toward Functional Goals (OT) progress toward functional goals as expected  -KR                  User Key  (r) = Recorded By, (t) = Taken By, (c) = Cosigned By      Initials Name Effective Dates    KR Fabrizio Ramey OT 06/16/21 -                            OT Recommendation and Plan  Planned Therapy Interventions (OT): activity tolerance training, strengthening exercise, BADL retraining, transfer/mobility retraining  Progress Toward Functional Goals (OT): progress toward functional goals as expected  Plan of Care Review  Plan of Care Reviewed With: patient  Plan of Care Reviewed With: patient        Time Calculation:     Therapy Charges for Today       Code Description Service Date Service Provider Modifiers Qty    78205292740  OT THERAPEUTIC ACT EA 15 MIN 1/29/2024 Fabrizio Ramey OT GO 1    [id number]  OT SELF CARE/MGMT/TRAIN EA 15 MIN 1/29/2024 Fabrizio Ramey OT GO 2    [id number]  OT SELF  "CARE/MGMT/TRAIN EA 15 MIN 2024 Fabrizio Ramey OT GO 1                 Fabrizio Ramey OT  2024    Electronically signed by Fabrizio Ramey OT at 24 1040          Speech Language Pathology Notes (most recent note)        Jennifer Devlin MS CCC-SLP at 24 1133          Acute Care - Speech Language Pathology   Swallow Initial Evaluation [facility]  CLINICAL DYSPHAGIA EVALUATION     Patient Name: Leonie Laws  : 1940  MRN: [id number]  Today's Date: 2024     Admit Date: 2024  Leonie Laws  was seen at bedside this am on [id number] to assess safety/efficacy of swallowing fnx, determine safest/least restrictive diet tolerance.     Ms Laws has a medical hx significant for anxiety, hyperlipidemia, trigeminal neuralgia, and hypothyroidism. She is unfamiliar to SLP department of [facility] prior to this admission.     Per EMR review, Ms Laws was reported to have been \"getting progressively weaker over the past several weeks\" w/ poor po intake noted. Nausea and vomiting was reported to have begun the day prior to presentation to [facility] and her son had reported concern for aspiration of her vomit as she had been noted by family to have an intermittent cough since that time.     Upon arrival to [facility] ED, \"vitals were temperature 98.4, , RR 14, /75, SpO2 98% on room air.  Labs significant for corrected sodium 130, glucose 233.  Hemoglobin A1c 5.9.  CRP 25.25, lactate 2.2, WBC 18.79 with left shift.  Chest x-ray shows left basilar atelectasis versus pneumonia.  CT abdomen/pelvis shows no acute identified findings.  Moderate constipation.  Bibasilar airspace disease most consistent with atelectasis.  CT head with no acute findings\".     Social History     Socioeconomic History    Marital status:    Tobacco Use    Smoking status: Never    Smokeless tobacco: Never   Vaping Use    Vaping Use: Never used   Substance and Sexual Activity    Alcohol use: No    Drug use: No    Sexual activity: Defer "   Imaging:     Radiology Results (last 21 days)    Procedure Component Value Units Date/Time    CT Abdomen Pelvis With Contrast [[id number]] Melvin as Reviewed   Order Status: Completed Collected: 01/22/24 1720    Updated: 01/22/24 1726   Narrative:     EXAM:    CT Abdomen and Pelvis With Intravenous Contrast     EXAM DATE:    1/22/2024 5:05 PM     CLINICAL HISTORY:    abd pain/sepsis     TECHNIQUE:    Axial computed tomography images of the abdomen and pelvis with  intravenous contrast.  Sagittal and coronal reformatted images were  created and reviewed.  This CT exam was performed using one or more of  the following dose reduction techniques:  automated exposure control,  adjustment of the mA and/or kV according to patient size, and/or use of  iterative reconstruction technique.     COMPARISON:    No relevant prior studies available.     FINDINGS:    Lung bases:  Dependent bibasilar atelectasis. Lung bases otherwise  clear.    Heart:  Mild coronary artery calcifications.      ABDOMEN:    Liver:  Unremarkable as visualized.  No mass.    Gallbladder and bile ducts:  Cholecystectomy.  No ductal dilation.    Pancreas:  Mild fatty infiltration of the pancreas.  No ductal  dilation.    Spleen:  Unremarkable as visualized.  No splenomegaly.    Adrenals:  Unremarkable as visualized.  No mass.    Kidneys and ureters:  There are bilateral renal parapelvic cysts but  no evidence of hydronephrosis.  No renal or ureteral stones.  No  obstructive uropathy.    Stomach and bowel:  Stomach is incompletely distended.  Diffuse  colonic diverticulosis without evidence of acute diverticulitis.      PELVIS:    Appendix:  The appendix is not clearly identified.    Bladder:  Distended urinary bladder without wall thickening.    Reproductive:  Hysterectomy noted.      ABDOMEN and PELVIS:    Intraperitoneal space:  Unremarkable as visualized.  No significant  fluid collection.  No pneumoperitoneum.    Bones/joints:  Degenerative changes of  the lumbar spine with mild  multilevel stenosis but no acute bony findings identified.  Arthritic  changes of the bilateral hip joints.  No dislocation.    Soft tissues:  Unremarkable as visualized.    Vasculature:  Advanced calcified atherosclerosis without evidence of  aneurysm.    Lymph nodes:  Unremarkable as visualized.  No enlarged lymph nodes.   Impression:     1.  No acute findings identified within abdomen or pelvis.  2.  Urinary bladder distention without wall thickening.  3.  Prior cholecystectomy and hysterectomy.  4.  Bilateral renal parapelvic cysts. No obstructive uropathy.  5.  Colonic diverticulosis without diverticulitis.  6.  Moderate constipation. No bowel obstruction.  7.  Bibasilar airspace disease most consistent with atelectasis.  8.  Other incidental/nonacute findings above.     This report was finalized on 1/22/2024 5:24 PM by Dr. Fabrizio Fair MD.       CT Head Without Contrast [[id number]] Melvin as Reviewed   Order Status: Completed Collected: 01/22/24 1718    Updated: 01/22/24 1722   Narrative:     EXAM:    CT Head Without Intravenous Contrast     EXAM DATE:    1/22/2024 5:05 PM     CLINICAL HISTORY:    headache     TECHNIQUE:    Axial computed tomography images of the head/brain without intravenous  contrast.  Sagittal and coronal reformatted images were created and  reviewed.  This CT exam was performed using one or more of the following  dose reduction techniques:  automated exposure control, adjustment of  the mA and/or kV according to patient size, and/or use of iterative  reconstruction technique.     COMPARISON:    No relevant prior studies available.     FINDINGS:    Brain and extra-axial spaces:  Unremarkable as visualized.  No  hemorrhage.  No significant white matter disease.  No edema.  No  ventriculomegaly. Mild age-related atrophy noted.    Bones/joints:  Unremarkable as visualized.  No acute fracture.    Soft tissues:  Unremarkable as visualized.    Sinuses:  Unremarkable  as visualized.  No acute sinusitis.    Mastoid air cells:  Unremarkable as visualized.  No mastoid effusion.   Impression:       Unremarkable exam demonstrating no CT evidence of acute intracranial  findings.     This report was finalized on 1/22/2024 5:20 PM by Dr. Fabrizio Fair MD.       XR Chest 1 View [[id number]] Melvin as Reviewed   Order Status: Completed Collected: 01/22/24 1615    Updated: 01/22/24 1617   Narrative:     PROCEDURE: XR CHEST 1 VW-       HISTORY: Cough, vomiting     COMPARISON: None.     FINDINGS: The heart is normal in size. The mediastinum is unremarkable.  There are low lung volumes with a left basilar opacity either  atelectasis or pneumonia. There are no effusions. There is no  pneumothorax. There are no acute osseous abnormalities.      Impression:     Low lung volumes with left basilar atelectasis versus  pneumonia.     This report was finalized on 1/22/2024 4:15 PM by David Ch M.D..     Labs:   Latest Reference Range & Units 01/22/24 15:34 01/23/24 05:09   WBC 3.40 - 10.80 10*3/mm3 18.78 (H) 16.16 (H)   RBC 3.77 - 5.28 10*6/mm3 4.26 3.18 (L)   Hemoglobin 12.0 - 15.9 g/dL 12.6 9.3 (L)   Hematocrit 34.0 - 46.6 % 38.4 27.9 (L)   Platelets 140 - 450 10*3/mm3 399 340   RDW 12.3 - 15.4 % 14.4 14.2   MCV 79.0 - 97.0 fL 90.1 87.7   MCH 26.6 - 33.0 pg 29.6 29.2   MCHC 31.5 - 35.7 g/dL 32.8 33.3   MPV 6.0 - 12.0 fL 9.7 10.4   RDW-SD 37.0 - 54.0 fl 47.3 45.8   (H): Data is abnormally high  (L): Data is abnormally low  Diet Orders (active) (From admission, onward)       Start     Ordered    01/23/24 1136  DIET MESSAGE Diet has been NPO. Please send a lunch tray.  Once        Comments: Diet has been NPO. Please send a lunch tray.    01/23/24 1135    01/23/24 1135  Diet: Regular/House Diet; Feeding Assistance - Nursing; Texture: Soft to Chew (NDD 3); Soft to Chew: Chopped Meat; Fluid Consistency: Thin (IDDSI 0)  Diet Effective Now         01/23/24 1135                  Upon SLP presentation  to pt room, MD is at bedside as well as family members. She answers MD questions appropriately though is limitedly conversant. Following MD departure from room, Ms Laws is able to introduce remaining family member as well as her relationship to pt. She is oriented to self and current situation at this time. She is able to discuss prior occupation and family relationships w/ appropriate answers. Generally flat affect is noted.     She is observed on room air w/o complications across this evaluation.    Ms Laws was repositioned to fully upright and centered in bed w/ assist from MD to accept multiple po presentations of ice chips, solid cracker, puree, and thin liquids via spoon and straw. Ms Laws initially reported that she was able to self provide po trials, however when given opportunity to do so she requested assistance as she stated she was concerned for spilling or dropping cup or utensil.     Facial/oral structures were generally symmetrical upon observation. Lingual protrusion revealed no deviation. Oral mucosa were slightly xerostomic, pink, and clean. Secretions were clear, thin, and well controlled. OROM/NATASHA was generally weak overall to imitate oral postures. Gag is not assessed. Volitional cough was intact w/ moderately weak intensity, clear in quality, non-productive. Voice was adequate in intensity, clear in quality w/ intelligible speech.    Upon po presentations, adequate bolus anticipation and acceptance for spoon and straw presentation. She initially reports she will accept cup presentation style, however as she begins to bring cup toward labial edge w/ SLP assist for cup stabilization she declines and requests a straw. Labial seal is mildly weak for bolus clearance via spoon bowl w/ some trace oral loss of thin liquids and incomplete clearance of puree consistency from spoon bowl noted. Labial seal is additionally noted to be mildly weak for suction via straw w/ repeated attempts made prior to  obtaining bolus via straw. She denies increased effort or weakness. Bolus formation, manipulation, and control appeared to be mildly weak  and prolonged overall w/ prolonged mastication of solid consistency only and mixed phasic-rotary mastication pattern demonstrated. A-p transit was timely w/o significant oral residue appreciated. No overt s/s aspiration were evidenced before the swallow.     Pharyngeal swallow was timely w/ adequate hyolaryngeal elevation per palpation. No overt s/s aspiration evidenced across this evaluation. No silent aspiration suspected. She denied odynophagia.      Visit Dx:     ICD-10-CM ICD-9-CM   1. Nausea and vomiting, unspecified vomiting type  R11.2 787.01   2. Dehydration  E86.0 276.51   3. Debility  R53.81 799.3     Patient Active Problem List   Diagnosis    Cystocele    Diverticulosis of large intestine    Hemorrhoids    Hyperlipidemia    Postoperative hypothyroidism    Impaired fasting glucose    Osteoporosis    Rectocele    Trigeminal neuralgia    Anxiety    Vomiting     Past Medical History:   Diagnosis Date    Anxiety     Hyperlipidemia     Trigeminal neuralgia      Past Surgical History:   Procedure Laterality Date    BREAST SURGERY      HYSTERECTOMY         Impression:     Ms Laws presented w/ concerns for a mild oral dysphagia w/ weak labial seal evidenced w/ some oral loss, mildly prolonged bolus formation and manipulation, and increased mastication of solid consistency only. Pharyngeal swallow was felt to be wfl.     Per this evaluation's findings w/ observed weakness during bolus prep, Ms Laws is felt to most benefit from diet initiation of soft to chew textures, chopped meats, and thin liquids. She is further felt to benefit from assistance w/ feeding per observed limitations related to self-provision across this evaluation despite Ms Laws reporting she is able to perform this task. Medications are recommended to be administered whole in puree/thins or crushed/broken  PRN. Recommend diet to be advanced as Ms Lipkami tolerates w/ improvement in acute status.     SLP Recommendation and Plan     1. Mechanical soft textures, chopped meats, thin liquids   2. Medications whole in puree/thins. Crushed/broken PRN.  3. Upright and centered for all po intake  4. FLAVIA precautions.  5. Oral care protocol.  6. 1:1 assistance w/ po intake.   7. Universal aspiration precautions.   8. Advance po diet as tolerated.     No further formal SLP f/u warranted/recommended at this time. Diet to be advanced as pt tolerates.     D/w patient results and recommendations w/ verbal agreement.    D/w MD results and recommendations w/ verbal agreement.    Thank you for allowing me to participate in the care of your patient-  Jennifer Devlin M.S., CCC-SLP        EDUCATION  The patient has been educated in the following areas:   Dysphagia (Swallowing Impairment) Oral Care/Hydration.     Time Calculation:       Therapy Charges for Today       Code Description Service Date Service Provider Modifiers Qty    [id number] HC ST EVAL ORAL PHARYNG SWALLOW 4 1/23/2024 Jennifer Devlin MS CCC-SLP GN 1                 Jennifer Devlin MS CCC-SLP  1/23/2024    Electronically signed by Jennifer Devlin MS CCC-SLP at 01/23/24 1433       ADL Documentation (most recent)      Flowsheet Row Most Recent Value   Transferring 2 - assistive person   Toileting 2 - assistive person   Bathing 2 - assistive person   Dressing 2 - assistive person   Eating 2 - assistive person   Communication 2 - difficulty understanding (not related to language barrier)   Swallowing 0 - swallows foods/liquids without difficulty   Equipment Currently Used at Home none            Discharge Summary    No notes of this type exist for this encounter.       Discharge Order (From admission, onward)       Start     Ordered    01/31/24 0836  Discharge patient  Once        Expected Discharge Date: 01/31/24   Expected Discharge Time: Morning   Discharge Disposition: Skilled Nursing  Facility (DC - External)   Physician of Record for Attribution - Please select from Treatment Team: TRIP MEJIA [[id number]]   Review needed by CMO to determine Physician of Record: No      Question Answer Comment   Physician of Record for Attribution - Please select from Treatment Team TRIP MEJIA    Review needed by CMO to determine Physician of Record No        01/31/24 0838

## 2024-01-31 NOTE — PLAN OF CARE
Goal Outcome Evaluation:              Outcome Evaluation: Pt being discharged to NH.

## 2024-01-31 NOTE — PAYOR COMM NOTE
"CONTACT: DEVEN BYRD RN  UTILIZATION MANAGEMENT DEPT.  [facility]  [address] [place] [address]  PHONE: [phone]  FAX: [phone]        DISCHARGE NOTIFICATION  DC DATE: 1/31/24 TO SNF    REF#[id number]       Leonie Laws (83 y.o. Female)       Date of Birth   1940    Social Security Number       Address   [address]  [facility] [address]    Home Phone   [phone]    MRN   [id number]       Hinduism   Unknown    Marital Status                               Admission Date   1/22/24    Admission Type   Emergency    Admitting Provider   [place] DO    Attending Provider       Department, Room/Bed   [place] [id number]/       Discharge Date   1/31/2024    Discharge Disposition   Skilled Nursing Facility (DC - External)    Discharge Destination   Other                              Attending Provider: (none)   Allergies: Doxycycline, Gentamycin [Gentamicin], Miconazole, Penicillins, Sulfa Antibiotics    Isolation: None   Infection: None   Code Status: No CPR    Ht: 172.7 cm (68\")   Wt: 56.6 kg (124 lb 12.8 oz)    Admission Cmt: None   Principal Problem: Vomiting [R11.10]                   Active Insurance as of 1/22/2024       Primary Coverage       Payor Plan Insurance Group Employer/Plan Group    HUMANA MEDICARE REPLACEMENT HUMANA MED ADV GROUP [id number]       Payor Plan Address Payor Plan Phone Number Payor Plan Fax Number Effective Dates    [address] [address] [phone]  1/1/2015 - None Entered    [facility] [address]         Subscriber Name Subscriber Birth Date Member ID       LEONIE LAWS 1940 [id number]                     Emergency Contacts        (Rel.) Home Phone Work Phone Mobile Phone    Yash Bond (Son) [phone] -- [phone]    Giovanny Bond (Son) [phone] -- [phone]    Consuelo Bond (Relative) -- -- [phone]    Justen Laws (Spouse) [phone] -- --                 Discharge Summary "        Agusto Saravia MD at 24 1014              [facility] DISCHARGE SUMMARY    Patient Identification:  Name:  Leonie Laws  Age:  83 y.o.  Sex:  female  :  1940  MRN:  [id number]  Visit Number:  [id number]    Date of Admission: 2024  Date of Discharge:  2024     PCP: Joe Bowden MD    DISCHARGE DIAGNOSIS  Severe Sepsis - Resolved   Pneumonia, Bacterial, treated for aspiration/pneumonitis - Resolved   Strep Pyogenes Bacteremia - Improved   Nausea/vomiting - Improved   Non-Insulin Dependent Diabetes Mellitus Type II, controlled, unknown complications  Hypothyroid  Anxiety/Depression  Gastroesophageal Reflux Disease  Dementia    CONSULTS   Consults       Date and Time Order Name Status Description    2024  3:14 PM Inpatient Infectious Diseases Consult Completed     2024 12:29 PM Inpatient Psychiatrist Consult Completed           PROCEDURES PERFORMED  None    HOSPITAL COURSE  Patient is a 83 y.o. female presented to [facility] complaining of vomiting/weakness.  Please see the admitting history and physical for further details.      #Severe Sepsis & due to Pneumonia, Bacterial, treating for aspiration/pneumonitis in setting of nausea/vomiting, though also with noted Strep Pyogenes Bacteremia  Patient met SIRS criteria due to heart rate > 90, WBC count > 12K, due to lactate > 2 patient met criteria for Severe Sepsis.  Infectious Disease consulted and followed, blood cultures cleared on repeat, Infectious Disease recommended antibiotics through  and could be de-escalated to oral upon discharge, will continue oral Cefdinir through .  Patient deemed not to have capacity, son obtained guardianship, he is agreeable to short term rehab, patient is being discharge today for rehab, follow up PCP 1 week, follow up Infectious Disease 1 week.     #Non-Insulin Dependent Diabetes Mellitus Type II, controlled, unknown complications  Continued  home regimen    #Hypothyroid  Continued home Levothyroxine    #Anxiety/Depression  Continued home regimen     #Gastroesophageal Reflux Disease  Continued home PPI    #Dementia  No home medications, supportive care    Edited by: Agusto Saravia MD at 1/31/2024 1014    VITAL SIGNS:  Temp:  [97.7 °F (36.5 °C)-98.5 °F (36.9 °C)] 98.5 °F (36.9 °C)  Heart Rate:  [] 91  Resp:  [16-18] 18  BP: (101-131)/(56-67) 131/67  SpO2:  [94 %-96 %] 94 %  on   ;   Device (Oxygen Therapy): room air    Body mass index is 18.98 kg/m².  Wt Readings from Last 3 Encounters:   01/22/24 56.6 kg (124 lb 12.8 oz)   06/25/20 66.5 kg (146 lb 9.6 oz)   05/04/20 70.1 kg (154 lb 9.6 oz)     PHYSICAL EXAM:  Constitutional:  Elderly, No acute distress.      HENT:  Head:  Normocephalic and atraumatic.  Mouth:  Moist mucous membranes.    Eyes:  Conjunctivae and EOM are normal. No scleral icterus.    Neck:  Neck supple.  No JVD present.    Cardiovascular:  Normal rate, regular rhythm and normal heart sounds with no murmur.  Pulmonary/Chest:  No respiratory distress, on room air   Abdominal:  Soft. No distension and no tenderness.   Musculoskeletal:  No tenderness, and no deformity.  No red or swollen joints anywhere.    Neurological:  Alert and oriented to person, place.  No gross focal deficits     Skin:  Skin is warm and dry. No rash noted. No pallor.   Peripheral vascular:  No clubbing, no cyanosis, no edema.  Psychiatric: Appropriate mood and affect    Edited by: Agusto Saravia MD at 1/31/2024 1014    DISCHARGE DISPOSITION   Stable    DISCHARGE MEDICATIONS:     Discharge Medications        New Medications        Instructions Start Date   cefdinir 300 MG capsule  Commonly known as: OMNICEF   300 mg, Oral, 2 Times Daily             Continue These Medications        Instructions Start Date   atorvastatin 20 MG tablet  Commonly known as: LIPITOR   20 mg, Oral, Daily      buPROPion  MG 24 hr tablet  Commonly known as: WELLBUTRIN XL   150 mg,  Oral, Daily      gabapentin 400 MG capsule  Commonly known as: NEURONTIN   400 mg, Oral, Nightly      LORazepam 2 MG tablet  Commonly known as: ATIVAN   2 mg, Oral, Nightly      meclizine 25 MG tablet  Commonly known as: ANTIVERT   25 mg, Oral, 3 Times Daily PRN      megestrol 40 MG/ML suspension  Commonly known as: MEGACE   200 mg, Oral, 3 times daily      omeprazole 20 MG capsule  Commonly known as: priLOSEC   20 mg, Oral, Daily      ondansetron 8 MG tablet  Commonly known as: ZOFRAN   8 mg, Oral, Every 8 Hours PRN             Diet Instructions       Diet: Regular/House Diet; Soft to Chew (NDD 3); Chopped Meat; Thin (IDDSI 0); Feeding Assistance - Nursing      Discharge Diet: Regular/House Diet    Texture: Soft to Chew (NDD 3)    Soft to Chew: Chopped Meat    Fluid Consistency: Thin (IDDSI 0)    Diet Modifiers / Additional Diets: Feeding Assistance - Nursing          Activity Instructions    Activity as Tolerated         Additional Instructions for the Follow-ups that You Need to Schedule       Discharge Follow-up with PCP   As directed       Currently Documented PCP:    Joe Bowden MD    PCP Phone Number:    [phone]     Follow Up Details: 1 week post hospital discharge        Discharge Follow-up with Specified Provider: ID 1 week   As directed      To: ID 1 week               Contact information for follow-up providers       Joe Bowden MD .    Specialty: Family Medicine  Why: 1 week post hospital discharge  Contact information:  [id number]  [facility] [address] [phone]                       Contact information for after-discharge care       Destination       [facility] .    Service: Skilled Nursing  Contact information:  [address] [place] [id number] [phone]                                  TEST  RESULTS PENDING AT DISCHARGE  None    CODE STATUS  Code Status and Medical Interventions:   Ordered at: 01/22/24  2049     Medical Intervention Limits:    NO intubation (DNI)     Code Status (Patient has no pulse and is not breathing):    No CPR (Do Not Attempt to Resuscitate)     Medical Interventions (Patient has pulse or is breathing):    Limited Support     Trip Saravia MD  [facility]  01/31/24  10:14 EST    Please note that this discharge summary required more than 30 minutes to complete.        Electronically signed by Trip Saravia MD at 01/31/24 1016       Discharge Order (From admission, onward)       Start     Ordered    01/31/24 0836  Discharge patient  Once        Expected Discharge Date: 01/31/24   Expected Discharge Time: Morning   Discharge Disposition: Skilled Nursing Facility (DC - External)   Physician of Record for Attribution - Please select from Treatment Team: TRIP SARAVIA [[id number]]   Review needed by CMO to determine Physician of Record: No      Question Answer Comment   Physician of Record for Attribution - Please select from Treatment Team TRIP SARAVIA    Review needed by CMO to determine Physician of Record No        01/31/24 0838

## 2024-02-08 ENCOUNTER — HOSPITAL ENCOUNTER (EMERGENCY)
Facility: HOSPITAL | Age: 84
Discharge: NURSING, REHAB OR LONG-TERM CARE | End: 2024-02-08
Attending: EMERGENCY MEDICINE | Admitting: EMERGENCY MEDICINE
Payer: MEDICARE

## 2024-02-08 ENCOUNTER — APPOINTMENT (OUTPATIENT)
Dept: CT IMAGING | Facility: HOSPITAL | Age: 84
End: 2024-02-08
Payer: MEDICARE

## 2024-02-08 VITALS
RESPIRATION RATE: 18 BRPM | HEIGHT: 68 IN | TEMPERATURE: 98 F | WEIGHT: 124.78 LBS | HEART RATE: 84 BPM | OXYGEN SATURATION: 99 % | SYSTOLIC BLOOD PRESSURE: 134 MMHG | DIASTOLIC BLOOD PRESSURE: 77 MMHG | BODY MASS INDEX: 18.91 KG/M2
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
## 2024-02-08 DIAGNOSIS — S09.90XA MINOR HEAD INJURY, INITIAL ENCOUNTER: ICD-10-CM

## 2024-02-08 PROCEDURE — 70450 CT HEAD/BRAIN W/O DYE: CPT | Performed by: RADIOLOGY

## 2024-02-08 PROCEDURE — 99284 EMERGENCY DEPT VISIT MOD MDM: CPT

## 2024-02-08 PROCEDURE — 70450 CT HEAD/BRAIN W/O DYE: CPT

## 2024-02-08 NOTE — ED PROVIDER NOTES
Subjective   History of Present Illness  83-year-old female who presents to the ED today from [facility] due to a fall.  She states that she believes that she tripped and that caused her to fall.  The nursing home reports that she hit her head.  Patient denies any loss of consciousness.  She denies any pain or other complaints at this time.    History provided by:  Patient and nursing home  Fall  Mechanism of injury: fall    Injury location:  Head/neck  Head/neck injury location:  Head  Incident location:  Nursing home  Arrived directly from scene: yes    Fall:     Fall occurred:  Standing    Impact surface:  Hard floor    Point of impact:  Head  Suspicion of alcohol use: no    Suspicion of drug use: no    Prior to arrival data:     Blood loss:  None    Responsiveness at scene:  Alert    Loss of consciousness: no      Amnesic to event: no    Associated symptoms: no abdominal pain, no back pain, no chest pain, no difficulty breathing, no headaches, no loss of consciousness, no nausea, no neck pain and no vomiting    Risk factors: no anticoagulation therapy        Review of Systems   Constitutional: Negative.    HENT: Negative.     Eyes: Negative.    Respiratory: Negative.     Cardiovascular:  Negative for chest pain.   Gastrointestinal:  Negative for abdominal pain, nausea and vomiting.   Genitourinary: Negative.    Musculoskeletal:  Negative for back pain and neck pain.   Skin: Negative.    Neurological:  Negative for loss of consciousness and headaches.   Psychiatric/Behavioral: Negative.     All other systems reviewed and are negative.      Past Medical History:   Diagnosis Date    Anxiety     Hyperlipidemia     Trigeminal neuralgia        Allergies   Allergen Reactions    Doxycycline Hives    Gentamycin [Gentamicin] Other (See Comments)     Eye swelling    Miconazole      Burning    Penicillins GI Intolerance    Sulfa Antibiotics        Past Surgical History:   Procedure Laterality Date    BREAST  SURGERY      HYSTERECTOMY         Family History   Problem Relation Age of Onset    Stroke Mother     Heart disease Father        Social History     Socioeconomic History    Marital status:    Tobacco Use    Smoking status: Never    Smokeless tobacco: Never   Vaping Use    Vaping Use: Never used   Substance and Sexual Activity    Alcohol use: No    Drug use: No    Sexual activity: Defer           Objective   Physical Exam  Vitals and nursing note reviewed.   Constitutional:       General: She is not in acute distress.     Appearance: Normal appearance. She is not diaphoretic.   HENT:      Head: Normocephalic and atraumatic. No raccoon eyes, Dunlap's sign, abrasion, contusion or laceration.      Jaw: There is normal jaw occlusion.      Right Ear: External ear normal.      Left Ear: External ear normal.      Nose: Nose normal.   Eyes:      Extraocular Movements: Extraocular movements intact.      Conjunctiva/sclera: Conjunctivae normal.      Pupils: Pupils are equal, round, and reactive to light.   Cardiovascular:      Rate and Rhythm: Normal rate and regular rhythm.      Pulses: Normal pulses.      Heart sounds: Normal heart sounds.   Pulmonary:      Effort: Pulmonary effort is normal.      Breath sounds: Normal breath sounds. No wheezing or rhonchi.   Chest:      Chest wall: No tenderness.   Abdominal:      General: Bowel sounds are normal.      Palpations: Abdomen is soft.      Tenderness: There is no abdominal tenderness. There is no guarding or rebound.   Musculoskeletal:         General: No swelling, tenderness, deformity or signs of injury. Normal range of motion.      Cervical back: Normal range of motion and neck supple. No rigidity or tenderness.   Lymphadenopathy:      Cervical: No cervical adenopathy.   Skin:     General: Skin is warm and dry.      Capillary Refill: Capillary refill takes less than 2 seconds.   Neurological:      General: No focal deficit present.      Mental Status: She is alert.    Psychiatric:         Mood and Affect: Mood normal.         Procedures           ED Course  ED Course as of 02/08/24 1530   Thu Feb 08, 2024   1245 CT Head Without Contrast  FINDINGS:    BRAIN AND EXTRA-AXIAL SPACES:  Unremarkable as visualized.  No  hemorrhage.  No significant white matter disease.  No edema.  No  ventriculomegaly.    BONES/JOINTS:  Unremarkable as visualized.  No acute fracture.    SOFT TISSUES:  Unremarkable as visualized.    SINUSES:  Unremarkable as visualized.  No acute sinusitis.    MASTOID AIR CELLS:  Unremarkable as visualized.  No mastoid effusion.     IMPRESSION:    Unremarkable exam demonstrating no CT evidence of acute intracranial  findings.   [AH]      ED Course User Index  [AH] Sia Wheeler PA                                             Medical Decision Making  83-year-old female who presents to the ED today from [facility] due to a fall.  It is reported that she hit her head.  CT of the head was unremarkable.  She has been awake and alert, in no acute distress during her ED stay.  She has not had any complaints.  Plan is for discharge back to her skilled nursing facility.  She will follow-up as an outpatient and will return to the ED if symptoms change or worsen.    Problems Addressed:  Fall at nursing home, initial encounter: complicated acute illness or injury  Minor head injury, initial encounter: complicated acute illness or injury    Amount and/or Complexity of Data Reviewed  Radiology: ordered. Decision-making details documented in ED Course.        Final diagnoses:   Fall at nursing home, initial encounter   Minor head injury, initial encounter       ED Disposition  ED Disposition       ED Disposition   Discharge    Condition   Stable    Comment   --               Jerri Brown MD  [address] [facility] [address]  [phone]    Schedule an appointment as soon as possible for a visit in 1 week  As needed         Medication List      No changes were  made to your prescriptions during this visit.            Sia Wheeler PA  02/08/24 9896

## 2024-02-08 NOTE — DISCHARGE INSTRUCTIONS
Follow-up with her attending provider as needed.  She can return to the ED at any time if symptoms change or worsen.

## 2024-05-15 ENCOUNTER — APPOINTMENT (OUTPATIENT)
Dept: GENERAL RADIOLOGY | Facility: HOSPITAL | Age: 84
End: 2024-05-15
Payer: MEDICARE

## 2024-05-15 ENCOUNTER — APPOINTMENT (OUTPATIENT)
Dept: CT IMAGING | Facility: HOSPITAL | Age: 84
End: 2024-05-15
Payer: MEDICARE

## 2024-05-15 ENCOUNTER — HOSPITAL ENCOUNTER (EMERGENCY)
Facility: HOSPITAL | Age: 84
Discharge: HOME | End: 2024-05-15
Attending: STUDENT IN AN ORGANIZED HEALTH CARE EDUCATION/TRAINING PROGRAM
Payer: MEDICARE

## 2024-05-15 VITALS
SYSTOLIC BLOOD PRESSURE: 139 MMHG | TEMPERATURE: 97.3 F | OXYGEN SATURATION: 95 % | HEART RATE: 85 BPM | RESPIRATION RATE: 16 BRPM | BODY MASS INDEX: 20.4 KG/M2 | HEIGHT: 67 IN | WEIGHT: 130 LBS | DIASTOLIC BLOOD PRESSURE: 85 MMHG

## 2024-05-15 DIAGNOSIS — S00.83XA CONTUSION OF FACE, INITIAL ENCOUNTER: Primary | ICD-10-CM

## 2024-05-15 DIAGNOSIS — T14.8XXA ABRASION: ICD-10-CM

## 2024-05-15 PROCEDURE — 99284 EMERGENCY DEPT VISIT MOD MDM: CPT

## 2024-05-15 PROCEDURE — 70486 CT MAXILLOFACIAL W/O DYE: CPT | Performed by: RADIOLOGY

## 2024-05-15 PROCEDURE — 73130 X-RAY EXAM OF HAND: CPT

## 2024-05-15 PROCEDURE — 73130 X-RAY EXAM OF HAND: CPT | Performed by: RADIOLOGY

## 2024-05-15 PROCEDURE — 70486 CT MAXILLOFACIAL W/O DYE: CPT

## 2024-05-15 RX ORDER — ASPIRIN 325 MG
650 TABLET, DELAYED RELEASE (ENTERIC COATED) ORAL ONCE
Status: COMPLETED | OUTPATIENT
Start: 2024-05-15 | End: 2024-05-15

## 2024-05-15 RX ADMIN — ASPIRIN 650 MG: 325 TABLET, COATED ORAL at 22:00

## 2024-05-16 NOTE — ED PROVIDER NOTES
Subjective   History of Present Illness  Patient is a 83-year-old presents to the emergency room today with complaint of facial injury and right thumb pain status post fall.  Patient reports he was getting out of the car and she tripped.  Has been states that he attempted to try to break her fall but states that been able to do this.  Patient hit her face.  She has abrasion on the left cheek with some ecchymosis.  Patient also reports that she has some pain in her right thumb with some bruising.  Patient states she is able to move this.  Patient denies any other injury.  Patient had loss of consciousness.        Review of Systems   Constitutional: Negative.    HENT: Negative.     Eyes: Negative.    Respiratory: Negative.     Cardiovascular: Negative.    Gastrointestinal: Negative.    Endocrine: Negative.    Genitourinary: Negative.    Musculoskeletal: Negative.    Skin: Negative.    Allergic/Immunologic: Negative.    Neurological: Negative.    Hematological: Negative.    Psychiatric/Behavioral: Negative.         Past Medical History:   Diagnosis Date    Anxiety     Hyperlipidemia     Trigeminal neuralgia        Allergies   Allergen Reactions    Doxycycline Hives    Gentamycin [Gentamicin] Other (See Comments)     Eye swelling    Miconazole      Burning    Penicillins GI Intolerance    Sulfa Antibiotics        Past Surgical History:   Procedure Laterality Date    BREAST SURGERY      HYSTERECTOMY         Family History   Problem Relation Age of Onset    Stroke Mother     Heart disease Father        Social History     Socioeconomic History    Marital status:    Tobacco Use    Smoking status: Never    Smokeless tobacco: Never   Vaping Use    Vaping status: Never Used   Substance and Sexual Activity    Alcohol use: No    Drug use: No    Sexual activity: Defer           Objective   Physical Exam  Vitals and nursing note reviewed.   Constitutional:       Appearance: She is well-developed.   HENT:      Head:  Normocephalic.      Right Ear: External ear normal.      Left Ear: External ear normal.   Eyes:      Conjunctiva/sclera: Conjunctivae normal.      Pupils: Pupils are equal, round, and reactive to light.   Cardiovascular:      Rate and Rhythm: Normal rate and regular rhythm.      Heart sounds: Normal heart sounds.   Pulmonary:      Effort: Pulmonary effort is normal.      Breath sounds: Normal breath sounds.   Abdominal:      General: Bowel sounds are normal.      Palpations: Abdomen is soft.   Musculoskeletal:         General: Normal range of motion.      Cervical back: Normal range of motion and neck supple.   Skin:     General: Skin is warm and dry.      Capillary Refill: Capillary refill takes less than 2 seconds.      Comments: Abrasion on left cheek with ecchymosis       Neurological:      Mental Status: She is alert and oriented to person, place, and time.   Psychiatric:         Behavior: Behavior normal.         Thought Content: Thought content normal.         Procedures           ED Course                                             Medical Decision Making      Final diagnoses:   None       ED Disposition  ED Disposition       None            No follow-up provider specified.       Medication List      No changes were made to your prescriptions during this visit.

## 2024-08-07 NOTE — PROGRESS NOTES
"Subjective     Chief Complaint   Patient presents with   • Vaginal Itching   • Anxiety       Leonie Laws is a 77 y.o. female.     History of Present Illness comes in today regarding medication management for the anxiety.  Is having some recurrent vaginal itching.  No significant discharge.  Has maintained follow-ups with ophthalmology regarding the recurrent conjunctivitis.  Has maintained follow-up with neurology/neurosurgery in regard to the trigeminal neuralgia will be visiting soon.  States dietary modification is limited.  No regular aerobic activity.  Denies fever chills SOB CP palpitations  GI changes other than noted.    The following portions of the patient's history were reviewed and updated as appropriate: allergies, past family history, past medical history, past social history, past surgical history and problem list.    Review of Systems see the history of present illness    Objective   Physical Exam   Constitutional: She is oriented to person, place, and time. She appears well-developed and well-nourished.   HENT:   Head: Normocephalic.   Mouth/Throat: Oropharynx is clear and moist.   Eyes: EOM are normal. Pupils are equal, round, and reactive to light.   Neck: Normal range of motion. Neck supple.   Cardiovascular: Normal rate, regular rhythm and normal heart sounds.    No murmur heard.  Pulmonary/Chest: Effort normal and breath sounds normal.   Musculoskeletal: She exhibits no edema.   Neurological: She is alert and oriented to person, place, and time.   Skin: Skin is warm and dry.   Psychiatric: She has a normal mood and affect.   Vitals reviewed.    /73 (BP Location: Left arm, Patient Position: Sitting, Cuff Size: Adult)  Pulse 86  Temp 97 °F (36.1 °C) (Oral)   Ht 170.2 cm (67\")  Wt 66.8 kg (147 lb 4.8 oz)  BMI 23.07 kg/m2  Assessment/Plan   Leonie was seen today for vaginal itching and anxiety.    Diagnoses and all orders for this visit:    Postoperative hypothyroidism  -     " Comprehensive Metabolic Panel  -     TSH  -     SYNTHROID 100 MCG tablet; Take 1 tablet by mouth Daily.    Impaired fasting glucose  -     Comprehensive Metabolic Panel  -     Hemoglobin A1c    Anxiety  -     LORazepam (ATIVAN) 2 MG tablet; Take 1 tablet by mouth 2 (Two) Times a Day As Needed for Anxiety.    Subacute vaginitis  -     fluconazole (DIFLUCAN) 200 MG tablet; Take 1 tablet by mouth Daily.    Other orders  -     conjugated estrogens (PREMARIN) 0.625 MG/GM vaginal cream; Insert  into the vagina Daily. Use twice weekly    Today will check labs to monitor metabolic status.  Renewed the lorazepam No. 45 with 5 refills.  Stay safely active.  Encourage heart healthy low glycemic diet.  Medications as authorized.  Recheck in about 6 months or as needed.  It seems you are as current with PME as desired.  You declined any all vaccines.  As part of this patient's treatment plan I am prescribing controlled substances. The patient has been made aware of appropriate use of such medications, including potential risk of somnolence, limited ability to drive and/or work safely, and potential for dependence or overdose. It has also been made clear that these medications are for use by this patient only, without concomitant use of alcohol or other substances unless prescribed.  Patient has completed prescribing agreement detailing terms of continued prescribing of controlled substances, including monitoring CIRILO reports, urine drug screening, and pill counts if necessary. The patient is aware that inappropriate use will results in cessation of prescribing such medications.  CIRILO report has been reviewed.  CIRILO is updated every 3 months.  History and physical exam exhibit continued safe and appropriate use of controlled substances.               This document is complete and the patient is ready for discharge.

## 2024-10-29 ENCOUNTER — APPOINTMENT (OUTPATIENT)
Dept: CT IMAGING | Facility: HOSPITAL | Age: 84
End: 2024-10-29
Payer: MEDICARE

## 2024-10-29 ENCOUNTER — APPOINTMENT (OUTPATIENT)
Dept: GENERAL RADIOLOGY | Facility: HOSPITAL | Age: 84
End: 2024-10-29
Payer: MEDICARE

## 2024-10-29 ENCOUNTER — HOSPITAL ENCOUNTER (EMERGENCY)
Facility: HOSPITAL | Age: 84
Discharge: HOME | End: 2024-10-29
Attending: EMERGENCY MEDICINE | Admitting: STUDENT IN AN ORGANIZED HEALTH CARE EDUCATION/TRAINING PROGRAM
Payer: MEDICARE

## 2024-10-29 VITALS
WEIGHT: 120 LBS | SYSTOLIC BLOOD PRESSURE: 120 MMHG | OXYGEN SATURATION: 93 % | HEART RATE: 86 BPM | TEMPERATURE: 98.2 F | BODY MASS INDEX: 18.83 KG/M2 | HEIGHT: 67 IN | RESPIRATION RATE: 18 BRPM | DIASTOLIC BLOOD PRESSURE: 78 MMHG

## 2024-10-29 DIAGNOSIS — S00.01XA ABRASION OF SCALP, INITIAL ENCOUNTER: Primary | ICD-10-CM

## 2024-10-29 DIAGNOSIS — S09.90XA CLOSED HEAD INJURY, INITIAL ENCOUNTER: ICD-10-CM

## 2024-10-29 DIAGNOSIS — T07.XXXA MULTIPLE CONTUSIONS: ICD-10-CM

## 2024-10-29 DIAGNOSIS — S50.01XA CONTUSION OF RIGHT ELBOW, INITIAL ENCOUNTER: ICD-10-CM

## 2024-10-29 PROCEDURE — 25010000002 HYDROMORPHONE 1 MG/ML SOLUTION: Performed by: STUDENT IN AN ORGANIZED HEALTH CARE EDUCATION/TRAINING PROGRAM

## 2024-10-29 PROCEDURE — 73090 X-RAY EXAM OF FOREARM: CPT | Performed by: RADIOLOGY

## 2024-10-29 PROCEDURE — 73060 X-RAY EXAM OF HUMERUS: CPT | Performed by: RADIOLOGY

## 2024-10-29 PROCEDURE — 25510000001 IOPAMIDOL PER 1 ML: Performed by: EMERGENCY MEDICINE

## 2024-10-29 PROCEDURE — 73090 X-RAY EXAM OF FOREARM: CPT

## 2024-10-29 PROCEDURE — 72128 CT CHEST SPINE W/O DYE: CPT | Performed by: RADIOLOGY

## 2024-10-29 PROCEDURE — 96375 TX/PRO/DX INJ NEW DRUG ADDON: CPT

## 2024-10-29 PROCEDURE — 70450 CT HEAD/BRAIN W/O DYE: CPT | Performed by: RADIOLOGY

## 2024-10-29 PROCEDURE — 72125 CT NECK SPINE W/O DYE: CPT | Performed by: RADIOLOGY

## 2024-10-29 PROCEDURE — 99285 EMERGENCY DEPT VISIT HI MDM: CPT

## 2024-10-29 PROCEDURE — 73080 X-RAY EXAM OF ELBOW: CPT | Performed by: RADIOLOGY

## 2024-10-29 PROCEDURE — 71275 CT ANGIOGRAPHY CHEST: CPT

## 2024-10-29 PROCEDURE — 73060 X-RAY EXAM OF HUMERUS: CPT

## 2024-10-29 PROCEDURE — 72131 CT LUMBAR SPINE W/O DYE: CPT | Performed by: RADIOLOGY

## 2024-10-29 PROCEDURE — 71275 CT ANGIOGRAPHY CHEST: CPT | Performed by: RADIOLOGY

## 2024-10-29 PROCEDURE — 73080 X-RAY EXAM OF ELBOW: CPT

## 2024-10-29 PROCEDURE — 70450 CT HEAD/BRAIN W/O DYE: CPT

## 2024-10-29 PROCEDURE — 72131 CT LUMBAR SPINE W/O DYE: CPT

## 2024-10-29 PROCEDURE — 74177 CT ABD & PELVIS W/CONTRAST: CPT

## 2024-10-29 PROCEDURE — 96374 THER/PROPH/DIAG INJ IV PUSH: CPT

## 2024-10-29 PROCEDURE — 72125 CT NECK SPINE W/O DYE: CPT

## 2024-10-29 PROCEDURE — 72128 CT CHEST SPINE W/O DYE: CPT

## 2024-10-29 PROCEDURE — 74177 CT ABD & PELVIS W/CONTRAST: CPT | Performed by: RADIOLOGY

## 2024-10-29 PROCEDURE — 25010000002 ONDANSETRON PER 1 MG

## 2024-10-29 RX ORDER — ONDANSETRON 2 MG/ML
4 INJECTION INTRAMUSCULAR; INTRAVENOUS ONCE
Status: COMPLETED | OUTPATIENT
Start: 2024-10-29 | End: 2024-10-29

## 2024-10-29 RX ORDER — HYDROMORPHONE HYDROCHLORIDE 1 MG/ML
0.25 INJECTION, SOLUTION INTRAMUSCULAR; INTRAVENOUS; SUBCUTANEOUS ONCE
Status: COMPLETED | OUTPATIENT
Start: 2024-10-29 | End: 2024-10-29

## 2024-10-29 RX ORDER — IOPAMIDOL 755 MG/ML
100 INJECTION, SOLUTION INTRAVASCULAR
Status: COMPLETED | OUTPATIENT
Start: 2024-10-29 | End: 2024-10-29

## 2024-10-29 RX ORDER — ACETAMINOPHEN 325 MG/1
650 TABLET ORAL ONCE
Status: COMPLETED | OUTPATIENT
Start: 2024-10-29 | End: 2024-10-29

## 2024-10-29 RX ADMIN — ACETAMINOPHEN 650 MG: 325 TABLET ORAL at 22:04

## 2024-10-29 RX ADMIN — HYDROMORPHONE HYDROCHLORIDE 0.25 MG: 1 INJECTION, SOLUTION INTRAMUSCULAR; INTRAVENOUS; SUBCUTANEOUS at 19:32

## 2024-10-29 RX ADMIN — ONDANSETRON 4 MG: 2 INJECTION INTRAMUSCULAR; INTRAVENOUS at 19:33

## 2024-10-29 RX ADMIN — IOPAMIDOL 89 ML: 755 INJECTION, SOLUTION INTRAVENOUS at 20:04
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
## 2024-10-30 NOTE — DISCHARGE INSTRUCTIONS

## 2025-02-10 ENCOUNTER — LAB REQUISITION (OUTPATIENT)
Dept: LAB | Facility: HOSPITAL | Age: 85
End: 2025-02-10
Payer: MEDICARE

## 2025-02-10 DIAGNOSIS — R05.9 COUGH, UNSPECIFIED: ICD-10-CM

## 2025-02-10 DIAGNOSIS — R09.81 NASAL CONGESTION: ICD-10-CM

## 2025-02-10 PROCEDURE — 0202U NFCT DS 22 TRGT SARS-COV-2: CPT | Performed by: INTERNAL MEDICINE

## 2025-02-11 LAB
B PARAPERT DNA SPEC QL NAA+PROBE: NOT DETECTED
B PERT DNA SPEC QL NAA+PROBE: NOT DETECTED
C PNEUM DNA NPH QL NAA+NON-PROBE: NOT DETECTED
FLUAV SUBTYP SPEC NAA+PROBE: NOT DETECTED
FLUBV RNA ISLT QL NAA+PROBE: NOT DETECTED
HADV DNA SPEC NAA+PROBE: NOT DETECTED
HCOV 229E RNA SPEC QL NAA+PROBE: NOT DETECTED
HCOV HKU1 RNA SPEC QL NAA+PROBE: NOT DETECTED
HCOV NL63 RNA SPEC QL NAA+PROBE: NOT DETECTED
HCOV OC43 RNA SPEC QL NAA+PROBE: NOT DETECTED
HMPV RNA NPH QL NAA+NON-PROBE: NOT DETECTED
HPIV1 RNA ISLT QL NAA+PROBE: NOT DETECTED
HPIV2 RNA SPEC QL NAA+PROBE: NOT DETECTED
HPIV3 RNA NPH QL NAA+PROBE: NOT DETECTED
HPIV4 P GENE NPH QL NAA+PROBE: NOT DETECTED
M PNEUMO IGG SER IA-ACNC: NOT DETECTED
RHINOVIRUS RNA SPEC NAA+PROBE: NOT DETECTED
RSV RNA NPH QL NAA+NON-PROBE: NOT DETECTED
SARS-COV-2 RNA RESP QL NAA+PROBE: NOT DETECTED

## 2025-02-26 ENCOUNTER — LAB REQUISITION (OUTPATIENT)
Dept: LAB | Facility: HOSPITAL | Age: 85
End: 2025-02-26
Payer: MEDICARE

## 2025-02-26 DIAGNOSIS — R05.9 COUGH, UNSPECIFIED: ICD-10-CM

## 2025-02-26 LAB
B PARAPERT DNA SPEC QL NAA+PROBE: NOT DETECTED
B PERT DNA SPEC QL NAA+PROBE: NOT DETECTED
C PNEUM DNA NPH QL NAA+NON-PROBE: NOT DETECTED
FLUAV SUBTYP SPEC NAA+PROBE: NOT DETECTED
FLUBV RNA ISLT QL NAA+PROBE: NOT DETECTED
HADV DNA SPEC NAA+PROBE: NOT DETECTED
HCOV 229E RNA SPEC QL NAA+PROBE: NOT DETECTED
HCOV HKU1 RNA SPEC QL NAA+PROBE: NOT DETECTED
HCOV NL63 RNA SPEC QL NAA+PROBE: NOT DETECTED
HCOV OC43 RNA SPEC QL NAA+PROBE: DETECTED
HMPV RNA NPH QL NAA+NON-PROBE: NOT DETECTED
HPIV1 RNA ISLT QL NAA+PROBE: NOT DETECTED
HPIV2 RNA SPEC QL NAA+PROBE: NOT DETECTED
HPIV3 RNA NPH QL NAA+PROBE: NOT DETECTED
HPIV4 P GENE NPH QL NAA+PROBE: NOT DETECTED
M PNEUMO IGG SER IA-ACNC: NOT DETECTED
RHINOVIRUS RNA SPEC NAA+PROBE: NOT DETECTED
RSV RNA NPH QL NAA+NON-PROBE: NOT DETECTED
SARS-COV-2 RNA RESP QL NAA+PROBE: NOT DETECTED

## 2025-02-26 PROCEDURE — 0202U NFCT DS 22 TRGT SARS-COV-2: CPT | Performed by: INTERNAL MEDICINE

## 2025-07-23 ENCOUNTER — EXTERNAL PBMM DATA (OUTPATIENT)
Dept: PHARMACY | Facility: OTHER | Age: 85
End: 2025-07-23
Payer: MEDICARE